# Patient Record
Sex: MALE | Race: WHITE | Employment: OTHER | URBAN - METROPOLITAN AREA
[De-identification: names, ages, dates, MRNs, and addresses within clinical notes are randomized per-mention and may not be internally consistent; named-entity substitution may affect disease eponyms.]

---

## 2017-11-16 ENCOUNTER — HOSPITAL ENCOUNTER (OUTPATIENT)
Dept: SURGERY | Age: 62
Discharge: HOME | End: 2017-11-16
Attending: SURGERY

## 2017-11-21 ENCOUNTER — ANESTHESIA EVENT (OUTPATIENT)
Dept: SURGERY | Age: 62
End: 2017-11-21
Payer: COMMERCIAL

## 2017-11-21 RX ORDER — NALBUPHINE HYDROCHLORIDE 20 MG/ML
5 INJECTION, SOLUTION INTRAMUSCULAR; INTRAVENOUS; SUBCUTANEOUS
Status: CANCELLED | OUTPATIENT
Start: 2017-11-21

## 2017-11-21 RX ORDER — SODIUM CHLORIDE 0.9 % (FLUSH) 0.9 %
5-10 SYRINGE (ML) INJECTION AS NEEDED
Status: CANCELLED | OUTPATIENT
Start: 2017-11-21

## 2017-11-21 RX ORDER — NALOXONE HYDROCHLORIDE 0.4 MG/ML
0.1 INJECTION, SOLUTION INTRAMUSCULAR; INTRAVENOUS; SUBCUTANEOUS
Status: CANCELLED | OUTPATIENT
Start: 2017-11-21

## 2017-11-21 RX ORDER — FLUMAZENIL 0.1 MG/ML
0.2 INJECTION INTRAVENOUS
Status: CANCELLED | OUTPATIENT
Start: 2017-11-21

## 2017-11-21 RX ORDER — OXYCODONE HYDROCHLORIDE 5 MG/1
5 TABLET ORAL
Status: CANCELLED | OUTPATIENT
Start: 2017-11-21

## 2017-11-21 RX ORDER — SODIUM CHLORIDE 0.9 % (FLUSH) 0.9 %
5-10 SYRINGE (ML) INJECTION EVERY 8 HOURS
Status: CANCELLED | OUTPATIENT
Start: 2017-11-21

## 2017-11-21 RX ORDER — HYDROMORPHONE HYDROCHLORIDE 2 MG/ML
0.5 INJECTION, SOLUTION INTRAMUSCULAR; INTRAVENOUS; SUBCUTANEOUS
Status: CANCELLED | OUTPATIENT
Start: 2017-11-21

## 2017-11-22 ENCOUNTER — HOSPITAL ENCOUNTER (OUTPATIENT)
Age: 62
Setting detail: OUTPATIENT SURGERY
Discharge: HOME | End: 2017-11-22
Attending: SURGERY | Admitting: SURGERY
Payer: COMMERCIAL

## 2017-11-22 ENCOUNTER — ANESTHESIA (OUTPATIENT)
Dept: SURGERY | Age: 62
End: 2017-11-22
Payer: COMMERCIAL

## 2017-11-22 VITALS
BODY MASS INDEX: 24.99 KG/M2 | RESPIRATION RATE: 16 BRPM | TEMPERATURE: 97.3 F | HEIGHT: 75 IN | WEIGHT: 201 LBS | HEART RATE: 62 BPM | OXYGEN SATURATION: 98 % | SYSTOLIC BLOOD PRESSURE: 148 MMHG | DIASTOLIC BLOOD PRESSURE: 92 MMHG

## 2017-11-22 PROCEDURE — 77030008467 HC STPLR SKN COVD -B: Performed by: SURGERY

## 2017-11-22 PROCEDURE — 77030020782 HC GWN BAIR PAWS FLX 3M -B: Performed by: ANESTHESIOLOGY

## 2017-11-22 PROCEDURE — 74011250636 HC RX REV CODE- 250/636: Performed by: ANESTHESIOLOGY

## 2017-11-22 PROCEDURE — 77030002916 HC SUT ETHLN J&J -A: Performed by: SURGERY

## 2017-11-22 PROCEDURE — 74011250636 HC RX REV CODE- 250/636: Performed by: SURGERY

## 2017-11-22 PROCEDURE — 77030018836 HC SOL IRR NACL ICUM -A: Performed by: SURGERY

## 2017-11-22 PROCEDURE — 77030020143 HC AIRWY LARYN INTUB CGAS -A: Performed by: ANESTHESIOLOGY

## 2017-11-22 PROCEDURE — 76010000138 HC OR TIME 0.5 TO 1 HR: Performed by: SURGERY

## 2017-11-22 PROCEDURE — 74011000250 HC RX REV CODE- 250: Performed by: SURGERY

## 2017-11-22 PROCEDURE — 77030012411 HC DRN WND CARD -A: Performed by: SURGERY

## 2017-11-22 PROCEDURE — 77030031139 HC SUT VCRL2 J&J -A: Performed by: SURGERY

## 2017-11-22 PROCEDURE — 76060000032 HC ANESTHESIA 0.5 TO 1 HR: Performed by: SURGERY

## 2017-11-22 PROCEDURE — 88305 TISSUE EXAM BY PATHOLOGIST: CPT | Performed by: SURGERY

## 2017-11-22 PROCEDURE — 77030011640 HC PAD GRND REM COVD -A: Performed by: SURGERY

## 2017-11-22 PROCEDURE — 74011250636 HC RX REV CODE- 250/636

## 2017-11-22 PROCEDURE — 76210000016 HC OR PH I REC 1 TO 1.5 HR: Performed by: SURGERY

## 2017-11-22 PROCEDURE — 74011000250 HC RX REV CODE- 250

## 2017-11-22 PROCEDURE — 74011000250 HC RX REV CODE- 250: Performed by: ANESTHESIOLOGY

## 2017-11-22 RX ORDER — PROPOFOL 10 MG/ML
INJECTION, EMULSION INTRAVENOUS AS NEEDED
Status: DISCONTINUED | OUTPATIENT
Start: 2017-11-22 | End: 2017-11-22 | Stop reason: HOSPADM

## 2017-11-22 RX ORDER — OXYCODONE AND ACETAMINOPHEN 5; 325 MG/1; MG/1
1-2 TABLET ORAL
Qty: 40 TAB | Refills: 0 | Status: SHIPPED | OUTPATIENT
Start: 2017-11-22 | End: 2018-01-04 | Stop reason: ALTCHOICE

## 2017-11-22 RX ORDER — SODIUM CHLORIDE, SODIUM LACTATE, POTASSIUM CHLORIDE, CALCIUM CHLORIDE 600; 310; 30; 20 MG/100ML; MG/100ML; MG/100ML; MG/100ML
100 INJECTION, SOLUTION INTRAVENOUS CONTINUOUS
Status: DISCONTINUED | OUTPATIENT
Start: 2017-11-22 | End: 2017-11-22 | Stop reason: HOSPADM

## 2017-11-22 RX ORDER — KETOROLAC TROMETHAMINE 30 MG/ML
INJECTION, SOLUTION INTRAMUSCULAR; INTRAVENOUS AS NEEDED
Status: DISCONTINUED | OUTPATIENT
Start: 2017-11-22 | End: 2017-11-22 | Stop reason: HOSPADM

## 2017-11-22 RX ORDER — CEFAZOLIN SODIUM IN 0.9 % NACL 2 G/50 ML
2 INTRAVENOUS SOLUTION, PIGGYBACK (ML) INTRAVENOUS ONCE
Status: COMPLETED | OUTPATIENT
Start: 2017-11-22 | End: 2017-11-22

## 2017-11-22 RX ORDER — LIDOCAINE HYDROCHLORIDE 10 MG/ML
0.1 INJECTION INFILTRATION; PERINEURAL AS NEEDED
Status: DISCONTINUED | OUTPATIENT
Start: 2017-11-22 | End: 2017-11-22 | Stop reason: HOSPADM

## 2017-11-22 RX ORDER — LIDOCAINE HYDROCHLORIDE 20 MG/ML
INJECTION, SOLUTION EPIDURAL; INFILTRATION; INTRACAUDAL; PERINEURAL AS NEEDED
Status: DISCONTINUED | OUTPATIENT
Start: 2017-11-22 | End: 2017-11-22 | Stop reason: HOSPADM

## 2017-11-22 RX ORDER — MIDAZOLAM HYDROCHLORIDE 1 MG/ML
2 INJECTION, SOLUTION INTRAMUSCULAR; INTRAVENOUS
Status: DISCONTINUED | OUTPATIENT
Start: 2017-11-22 | End: 2017-11-22 | Stop reason: HOSPADM

## 2017-11-22 RX ORDER — BUPIVACAINE HYDROCHLORIDE 5 MG/ML
INJECTION, SOLUTION EPIDURAL; INTRACAUDAL AS NEEDED
Status: DISCONTINUED | OUTPATIENT
Start: 2017-11-22 | End: 2017-11-22 | Stop reason: HOSPADM

## 2017-11-22 RX ADMIN — PROPOFOL 200 MG: 10 INJECTION, EMULSION INTRAVENOUS at 16:11

## 2017-11-22 RX ADMIN — CEFAZOLIN 2 G: 1 INJECTION, POWDER, FOR SOLUTION INTRAMUSCULAR; INTRAVENOUS; PARENTERAL at 16:07

## 2017-11-22 RX ADMIN — SODIUM CHLORIDE, SODIUM LACTATE, POTASSIUM CHLORIDE, AND CALCIUM CHLORIDE 100 ML/HR: 600; 310; 30; 20 INJECTION, SOLUTION INTRAVENOUS at 13:33

## 2017-11-22 RX ADMIN — LIDOCAINE HYDROCHLORIDE 0.1 ML: 10 INJECTION, SOLUTION INFILTRATION; PERINEURAL at 13:33

## 2017-11-22 RX ADMIN — KETOROLAC TROMETHAMINE 30 MG: 30 INJECTION, SOLUTION INTRAMUSCULAR; INTRAVENOUS at 16:35

## 2017-11-22 RX ADMIN — MIDAZOLAM HYDROCHLORIDE 2 MG: 1 INJECTION, SOLUTION INTRAMUSCULAR; INTRAVENOUS at 14:21

## 2017-11-22 RX ADMIN — LIDOCAINE HYDROCHLORIDE 60 MG: 20 INJECTION, SOLUTION EPIDURAL; INFILTRATION; INTRACAUDAL; PERINEURAL at 16:11

## 2017-11-22 NOTE — DISCHARGE INSTRUCTIONS
1. Diet as tolerated except for a  low fat diet after laparoscopic cholecystectomy. 2. Showering is allowed, but no tub baths, hot tubs or swimming. 3. Drainage is common from the wounds. Change the dressings as needed. Call our office if the wounds become reddened, tender, feel warm to the touch or pus starts to drain from the wound. 4. Take prescribed pain medication as directed, usually Percocet, Norco, Ultram or Dilaudid. Take over the counter medication for minor pain. 5. Ice may be applied intermittently to the surgical site or sites. 6. Call or office, [phone], if problems arise. 7. Follow up in the office at the assigned time. 8. Resume all medications as taken per surgery, unless specifically instructed not to take certain ones. 9. No lifting more than 25 pounds until told otherwise. 10. Driving is allowed 3 days after surgery as long as you feel comfortable enough to drive and have not taken any prescription pain medication prior to driving. 11. Change the dressing as necessary. There is a drain in place with drainage expected.

## 2017-11-22 NOTE — ANESTHESIA PREPROCEDURE EVALUATION
Anesthetic History               Review of Systems / Medical History  Patient summary reviewed and pertinent labs reviewed    Pulmonary  Within defined limits                 Neuro/Psych         Psychiatric history     Cardiovascular    Hypertension: well controlled          Pacemaker (SSS)    Exercise tolerance: >4 METS     GI/Hepatic/Renal                Endo/Other        Arthritis     Other Findings              Physical Exam    Airway  Mallampati: II  TM Distance: > 6 cm  Neck ROM: normal range of motion   Mouth opening: Normal     Cardiovascular    Rhythm: regular  Rate: normal         Dental    Dentition: Caps/crowns     Pulmonary  Breath sounds clear to auscultation               Abdominal         Other Findings            Anesthetic Plan    ASA: 3  Anesthesia type: general          Induction: Intravenous  Anesthetic plan and risks discussed with: Patient

## 2017-11-22 NOTE — INTERVAL H&P NOTE
H&P Update:  Katherine Alvarado III was seen and examined. History and physical has been reviewed. The patient has been examined.  There have been no significant clinical changes since the completion of the originally dated History and Physical.    Signed By: Doe Silva MD     November 22, 2017 1:12 PM

## 2017-11-22 NOTE — PERIOP NOTES
O2 saturation down to 85% after Versed 2mg IV given. O2 at 2L nasal canula applied with sats to 94%. Siderails upx2, call light within reach. Patient responds appropriately to verbal stimuli.

## 2017-11-22 NOTE — H&P (VIEW-ONLY)
aDate: 10/26/2017      Name: Lobito Ghosh      MRN: [id number]       : 1955       Age: 60 y.o. Sex: male        Manuel Floyd MD       CC:    Chief Complaint   Patient presents with    New Patient     lipoma left hip       HPI:     Lobito Ghosh is a 60 y.o. male who presents for evaluation of a soft tissue mass as a referral from Dr. Eveline Yun. The patient has noticed a left buttock soft tissue mass for the past three months. It is painful at times. No report of trauma to the area. No drainage. No infection. PMH:    Past Medical History:   Diagnosis Date    Anxiety     controlled with med.  BPH (benign prostatic hypertrophy)     BPH: , 8-13-15 2014    Chest pain     Chronic prostatitis 2014    Depression     controlled with med.  Deviated nasal septum Dr. Riccardo DAVENPORT (degenerative joint disease), lumbosacral 10.2014 primarily L2-3    Dyslipidemia     Dyspnea/shortness of breath     Family History of Coronary Artery Bypass Surgery-mother 2009    GERD (gastroesophageal reflux disease)     controlled with meds    History of kidney stones     passed without surgery    HTN (hypertension) 2009    HX OTHER MEDICAL low hdls    Hypercalcemia     Hyperhomocysteinemia ([address]     Hyperlipidemia     controlled with med    Hypertension x 7 yrs    controlled with meds    Hypertriglyceridemia 08.    Pacemaker     Pre-diabetes .    Sick sinus syndrome,tachycardia-bradycardia  ([address]     Syncope and collapse 2009    Tubular adenoma 2006,  [employer]    Tubular adenoma of colon: '10, [facility] Ass[facility]  2015    Vitamin D deficiency        PSH:    Past Surgical History:   Procedure Laterality Date    HX COLONOSCOPY      HX ORTHOPAEDIC  1990    left  shoulder rotator cuff    HX ORTHOPAEDIC      repair of fx. elbow    HX PACEMAKER  2009    [employer]ronik       MEDS:    Current Outpatient Prescriptions   Medication Sig    losartan (COZAAR) 100 mg tablet Take 1 Tab by mouth daily.  sertraline (ZOLOFT) 100 mg tablet Take 1 Tab by mouth every morning.  simvastatin (ZOCOR) 10 mg tablet Take 1 Tab by mouth nightly.  CYANOCOBALAMIN, VITAMIN B-12, (VITAMIN B-12 PO) Take  by mouth daily. No current facility-administered medications for this visit. ALLERGIES:      Allergies   Allergen Reactions    Ace Inhibitors Cough       SH:    Social History   Substance Use Topics    Smoking status: Never Smoker    Smokeless tobacco: Never Used    Alcohol use No       FH:    Family History   Problem Relation Age of Onset    Heart Disease Mother     Diabetes Mother        ROS: The patient has no difficulty with chest pain or shortness of breath. No fever or chills. Comprehensive 13 point review of systems was otherwise unremarkable except as noted above. Physical Exam:     Visit Vitals    BP (!) 151/94    Pulse 67    Ht 6' 3\" (1.905 m)    Wt 201 lb (91.2 kg)    BMI 25.12 kg/m2       General: Alert, oriented, cooperative white male in no acute distress. Eyes: Sclera are clear. Conjunctiva and lids within normal limits. No icterus. Ears and Nose: no gross deformities to visual inspection, gross hearing intact  Neck: Supple, trachea midline. No lymphadenopathy. Resp: Breathing is  non-labored. Lungs clear to auscultation without wheezing or rhonchi   CV: RRR. No murmurs, rubs or gallops appreciated. Abd: soft non-tender and non-distended without peritoneal signs. +bs   Buttock: left lateral buttock with a 10 cm x 8 cm soft tissue mass. No skin involvement. No signs of infection. Tender to palpation of tissue surrounding the mass. Psych:  Mood and affect appropriate. Short-term memory and understanding intact      Assessment/Plan:  Aide Anne is a 60 y.o. male who has signs and symptoms consistent with a left buttock soft tissue mass.     1.  Excision of a left buttock soft tissue mass in the operating room. 2.  I went through the risks of bleeding, infection, anesthesia, hematoma/seroma formation and possible recurrence of the lesion. I said it may be necessary to place a drain. It may be necessary to leave the wound open, if badly infected.     Gricel Garza MD      [facility]   10/26/2017  3:36 PM

## 2017-11-22 NOTE — BRIEF OP NOTE
BRIEF OPERATIVE NOTE    Date of Procedure: 11/22/2017   Preoperative Diagnosis: LEFT HIP SOFT TISSUE MASS  Postoperative Diagnosis: SAME   Procedure(s):  EXCISION SOFT TISSUE MASS LEFT HIP MEASURING 10 CM X 6 CM X 2 CM  Surgeon(s) and Role:     * Merlin Mcalpine, MD - Primary         Assistant Staff:       Surgical Staff:  Circ-1: Krishna Hendrix RN  Scrub Tech-1: Fantasma Nicole  Event Time In   Incision Start 1626   Incision Close 1639     Anesthesia: General plus local  Estimated Blood Loss: 5 cc's  Specimens:   ID Type Source Tests Collected by Time Destination   1 : SOFT TISSUE MASS LEFT HIP Preservative   Merlin Mcalpine, MD 11/22/2017 1633 Pathology      Findings: See dictated note   Complications: None.   Implants: * No implants in log *

## 2017-11-23 NOTE — ANESTHESIA POSTPROCEDURE EVALUATION
Post-Anesthesia Evaluation and Assessment    Patient: Mariano Basurto MRN: [id number]  SSN: [id number]    YOB: 1955  Age: 60 y.o. Sex: male       Cardiovascular Function/Vital Signs  Visit Vitals    BP (P) 145/89    Pulse (P) 62    Temp 36.3 °C (97.3 °F)    Resp (P) 18    Ht 6' 3\" (1.905 m)    Wt 91.2 kg (201 lb)    SpO2 (P) 99%    BMI 25.12 kg/m2       Patient is status post general anesthesia for Procedure(s):  EXCISION SOFT TISSUE MASS LEFT HIP. Nausea/Vomiting: None    Postoperative hydration reviewed and adequate. Pain:  Pain Scale 1: Numeric (0 - 10) (11/22/17 1735)  Pain Intensity 1: 0 (11/22/17 1735)   Managed    Neurological Status:   Neuro (WDL): Exceptions to WDL (11/22/17 1735)  Neuro  Neurologic State: Drowsy (11/22/17 1735)  Orientation Level: Oriented X4 (11/22/17 1735)  Cognition: Follows commands (11/22/17 1735)  Speech: Clear (11/22/17 1735)  LUE Motor Response: Purposeful (11/22/17 1735)  LLE Motor Response: Purposeful (11/22/17 1735)  RUE Motor Response: Purposeful (11/22/17 1735)  RLE Motor Response: Purposeful (11/22/17 1735)   At baseline    Mental Status and Level of Consciousness: Arousable    Pulmonary Status:   O2 Device: (P) Room air (11/22/17 1750)   Adequate oxygenation and airway patent    Complications related to anesthesia: None    Post-anesthesia assessment completed.  No concerns    Signed By: Frank Sandhoff, MD     November 22, 2017

## 2017-11-23 NOTE — OP NOTES
[address]   OPERATIVE REPORT       Name:  Yomaira Sloan   MR#:  [id number]   :  1955   Account #:  [de-identified]   Date of Adm:  2017       DATE OF PROCEDURE: 2017    PREOPERATIVE DIAGNOSIS: Left hip soft tissue mass. POSTOPERATIVE DIAGNOSIS: Left hip soft tissue mass, probable   lipoma. PROCEDURE PERFORMED: Excision of a left buttock soft tissue   mass, measuring 10 x 6 x 2 cm. SURGEON: Fidelia Garza MD    ANESTHESIA: General via LMA, with Dr. Vania Anderson.    ESTIMATED BLOOD LOSS: 5 mL. SPECIMENS REMOVED: Left hip soft tissue mass. COMPLICATIONS:     HISTORY: This is a 60-year-old male referred by Dr. Amalia Lam for   evaluation of a left hip soft tissue mass. The patient says the   area has grown in size, and as it has grown in size, it has   gotten more uncomfortable. It has no noticeable when he stands   up. Dr. Amalia Lam referred him for surgical evaluation. I talked to   the patient, he wished to have it excised, scheduled for surgery   today. I went through risks of bleeding, infection, anesthesia,   hematoma, seroma formation and potential recurrence of the   lesion. The patient was agreeable, signed a consent form. DESCRIPTION OF PROCEDURE: The patient was seen in the preop   area, where the area was marked, with the patient's consent. The   patient was then transported to room #3 at [facility]   operating room. The patient had an LMA placed. Once he was on   the operating table, he was then turned in the right lateral   decubitus position. The beanbag had already been applied. It was   positioned so that the left hip was up toward the ceiling. This   area was then prepped and draped in the usual sterile manner. I   did a time-out identifying patient, surgeon and procedure, and   his birthday of 1955. When everyone in the room agreed, we   began the procedure.  I made a horizontal incision overlying this   soft tissue mass in the left hip. I incised the skin and soft   tissue. The soft tissue was further divided with electrocautery. A 10 x 6 x 2 cm soft tissue mass, probable lipoma, was then   excised. The lesion was sent to Pathology for evaluation. Flaps   were raised circumferentially. A 1/4-inch Penrose drain was   placed in the base of the wound. We irrigated the wound, and   when hemostasis had been achieved with electrocautery, we closed   the wound. The Penrose drain was secured to the skin with a 3-0   nylon suture, there were 3-0 nylon vertical mattress sutures   placed, and then surgical staples. I injected 30 mL of 0.5%   Marcaine around the wound site. The patient tolerated the   procedure well, was extubated, brought to recovery room in   stable condition.         Peyton Salcedo MD      [facility]   D:  11/22/2017   16:46   T:  11/23/2017   09:41   Job #:  [id number]

## 2019-04-16 PROBLEM — Z00.00 WELCOME TO MEDICARE PREVENTIVE VISIT: Status: ACTIVE | Noted: 2019-04-16

## 2019-04-16 PROBLEM — Z71.89 ADVANCED CARE PLANNING/COUNSELING DISCUSSION: Status: ACTIVE | Noted: 2019-04-16

## 2019-04-16 PROBLEM — Z00.00 WELCOME TO MEDICARE PREVENTIVE VISIT: Chronic | Status: ACTIVE | Noted: 2019-04-16

## 2019-04-16 PROBLEM — Z71.89 ADVANCED CARE PLANNING/COUNSELING DISCUSSION: Chronic | Status: ACTIVE | Noted: 2019-04-16

## 2020-06-11 NOTE — PROGRESS NOTES
Do you currently have any signs or symptoms of respiratory infection, such as fever, cough, shortness of breath, or sore throat? no    In the last 14 days have you had contact with someone with confirmed or presumptive diagnosis of COVID-19 or someone under investigation of COVID-19?  no    In the last 14 days have you traveled or have someone in your home who has traveled to [place], [facility], [facility], [place], [place], [place], [place], or [place]? no

## 2020-06-11 NOTE — PROGRESS NOTES
Patient pre-assessment complete for [facility] scheduled for 1230, arrival time 1030. Patient verified using . Patient instructed to bring all home medications in labeled bottles on the day of procedure. NPO status reinforced. Patient informed to take a full dose aspirin 325mg  or 81 mg x 4 on the day of procedure. Patient instructed to HOLD nothing. Instructed they can take all other medications excluding vitamins & supplements. Patient verbalizes understanding of all instructions & denies any questions at this time.

## 2020-06-12 ENCOUNTER — HOSPITAL ENCOUNTER (OUTPATIENT)
Dept: CARDIAC CATH/INVASIVE PROCEDURES | Age: 65
Discharge: HOME | End: 2020-06-12
Attending: INTERNAL MEDICINE | Admitting: INTERNAL MEDICINE
Payer: MEDICARE

## 2020-06-12 VITALS
SYSTOLIC BLOOD PRESSURE: 118 MMHG | DIASTOLIC BLOOD PRESSURE: 69 MMHG | BODY MASS INDEX: 23.62 KG/M2 | RESPIRATION RATE: 14 BRPM | HEART RATE: 66 BPM | WEIGHT: 190 LBS | HEIGHT: 75 IN | OXYGEN SATURATION: 98 %

## 2020-06-12 LAB
ANION GAP SERPL CALC-SCNC: 5 MMOL/L (ref 7–16)
ATRIAL RATE: 63 BPM
BUN SERPL-MCNC: 15 MG/DL (ref 8–23)
CALCIUM SERPL-MCNC: 9.8 MG/DL (ref 8.3–10.4)
CALCULATED P AXIS, ECG09: 96 DEGREES
CALCULATED R AXIS, ECG10: 72 DEGREES
CALCULATED T AXIS, ECG11: 68 DEGREES
CHLORIDE SERPL-SCNC: 108 MMOL/L (ref 98–107)
CO2 SERPL-SCNC: 28 MMOL/L (ref 21–32)
CREAT SERPL-MCNC: 1.16 MG/DL (ref 0.8–1.5)
DIAGNOSIS, 93000: NORMAL
ERYTHROCYTE [DISTWIDTH] IN BLOOD BY AUTOMATED COUNT: 12.9 % (ref 11.9–14.6)
GLUCOSE SERPL-MCNC: 99 MG/DL (ref 65–100)
HCT VFR BLD AUTO: 43.3 % (ref 41.1–50.3)
HGB BLD-MCNC: 14.4 G/DL (ref 13.6–17.2)
INR PPP: 1
MAGNESIUM SERPL-MCNC: 2 MG/DL (ref 1.8–2.4)
MCH RBC QN AUTO: 30.3 PG (ref 26.1–32.9)
MCHC RBC AUTO-ENTMCNC: 33.3 G/DL (ref 31.4–35)
MCV RBC AUTO: 91.2 FL (ref 79.6–97.8)
NRBC # BLD: 0 K/UL (ref 0–0.2)
P-R INTERVAL, ECG05: 244 MS
PLATELET # BLD AUTO: 160 K/UL (ref 150–450)
PMV BLD AUTO: 9.6 FL (ref 9.4–12.3)
POTASSIUM SERPL-SCNC: 3.8 MMOL/L (ref 3.5–5.1)
PROTHROMBIN TIME: 13 SEC (ref 12–14.7)
Q-T INTERVAL, ECG07: 386 MS
QRS DURATION, ECG06: 90 MS
QTC CALCULATION (BEZET), ECG08: 395 MS
RBC # BLD AUTO: 4.75 M/UL (ref 4.23–5.6)
SODIUM SERPL-SCNC: 141 MMOL/L (ref 136–145)
VENTRICULAR RATE, ECG03: 63 BPM
WBC # BLD AUTO: 4.4 K/UL (ref 4.3–11.1)

## 2020-06-12 PROCEDURE — 85027 COMPLETE CBC AUTOMATED: CPT

## 2020-06-12 PROCEDURE — 93005 ELECTROCARDIOGRAM TRACING: CPT | Performed by: INTERNAL MEDICINE

## 2020-06-12 PROCEDURE — C1894 INTRO/SHEATH, NON-LASER: HCPCS

## 2020-06-12 PROCEDURE — 80048 BASIC METABOLIC PNL TOTAL CA: CPT

## 2020-06-12 PROCEDURE — 74011636320 HC RX REV CODE- 636/320: Performed by: INTERNAL MEDICINE

## 2020-06-12 PROCEDURE — C1769 GUIDE WIRE: HCPCS

## 2020-06-12 PROCEDURE — 93458 L HRT ARTERY/VENTRICLE ANGIO: CPT

## 2020-06-12 PROCEDURE — 77030029997 HC DEV COM RDL R BND TELE -B

## 2020-06-12 PROCEDURE — 83735 ASSAY OF MAGNESIUM: CPT

## 2020-06-12 PROCEDURE — 77030016699 HC CATH ANGI DX INFN1 CARD -A

## 2020-06-12 PROCEDURE — 77030015766

## 2020-06-12 PROCEDURE — 99152 MOD SED SAME PHYS/QHP 5/>YRS: CPT

## 2020-06-12 PROCEDURE — 85610 PROTHROMBIN TIME: CPT

## 2020-06-12 PROCEDURE — 74011000250 HC RX REV CODE- 250: Performed by: INTERNAL MEDICINE

## 2020-06-12 PROCEDURE — 74011250636 HC RX REV CODE- 250/636: Performed by: INTERNAL MEDICINE

## 2020-06-12 RX ORDER — LIDOCAINE HYDROCHLORIDE 10 MG/ML
10-30 INJECTION, SOLUTION EPIDURAL; INFILTRATION; INTRACAUDAL; PERINEURAL ONCE
Status: COMPLETED | OUTPATIENT
Start: 2020-06-12 | End: 2020-06-12

## 2020-06-12 RX ORDER — FENTANYL CITRATE 50 UG/ML
25-50 INJECTION, SOLUTION INTRAMUSCULAR; INTRAVENOUS
Status: DISCONTINUED | OUTPATIENT
Start: 2020-06-12 | End: 2020-06-12 | Stop reason: HOSPADM

## 2020-06-12 RX ORDER — SODIUM CHLORIDE 9 MG/ML
75 INJECTION, SOLUTION INTRAVENOUS CONTINUOUS
Status: DISCONTINUED | OUTPATIENT
Start: 2020-06-12 | End: 2020-06-12 | Stop reason: HOSPADM

## 2020-06-12 RX ORDER — MIDAZOLAM HYDROCHLORIDE 1 MG/ML
.5-2 INJECTION, SOLUTION INTRAMUSCULAR; INTRAVENOUS
Status: DISCONTINUED | OUTPATIENT
Start: 2020-06-12 | End: 2020-06-12 | Stop reason: HOSPADM

## 2020-06-12 RX ORDER — GUAIFENESIN 100 MG/5ML
324 LIQUID (ML) ORAL ONCE
Status: DISCONTINUED | OUTPATIENT
Start: 2020-06-12 | End: 2020-06-12 | Stop reason: HOSPADM

## 2020-06-12 RX ORDER — HEPARIN SODIUM 10000 [USP'U]/ML
1000-10000 INJECTION, SOLUTION INTRAVENOUS; SUBCUTANEOUS
Status: DISCONTINUED | OUTPATIENT
Start: 2020-06-12 | End: 2020-06-12 | Stop reason: HOSPADM

## 2020-06-12 RX ORDER — HEPARIN SODIUM 200 [USP'U]/100ML
3 INJECTION, SOLUTION INTRAVENOUS CONTINUOUS
Status: DISCONTINUED | OUTPATIENT
Start: 2020-06-12 | End: 2020-06-12 | Stop reason: HOSPADM

## 2020-06-12 RX ADMIN — FENTANYL CITRATE 25 MCG: 50 INJECTION, SOLUTION INTRAMUSCULAR; INTRAVENOUS at 13:29

## 2020-06-12 RX ADMIN — IOPAMIDOL 100 ML: 755 INJECTION, SOLUTION INTRAVENOUS at 13:40

## 2020-06-12 RX ADMIN — SODIUM CHLORIDE 75 ML/HR: 900 INJECTION, SOLUTION INTRAVENOUS at 11:21

## 2020-06-12 RX ADMIN — MIDAZOLAM 2 MG: 1 INJECTION INTRAMUSCULAR; INTRAVENOUS at 13:29

## 2020-06-12 RX ADMIN — LIDOCAINE HYDROCHLORIDE 4 ML: 10 INJECTION, SOLUTION EPIDURAL; INFILTRATION; INTRACAUDAL; PERINEURAL at 13:10

## 2020-06-12 RX ADMIN — HEPARIN SODIUM 3 UNITS/HR: 200 INJECTION, SOLUTION INTRAVENOUS at 13:09

## 2020-06-12 RX ADMIN — HEPARIN SODIUM 2 ML: 10000 INJECTION, SOLUTION INTRAVENOUS; SUBCUTANEOUS at 13:00

## 2020-06-12 NOTE — PROGRESS NOTES
Patient received to [address] [place] [address] room # 11  Ambulatory from [facility]. Patient scheduled for [facility] today with Dr Ivanna Padilla. Procedure reviewed & questions answered, voiced good understanding consent obtained & placed on chart. All medications and medical history reviewed. Will prep patient per orders. Patient & family updated on plan of care. The patient has a fraility score of 3-MANAGING WELL, based on ability to perform ADLS by self.

## 2020-06-12 NOTE — PROGRESS NOTES
TRANSFER - IN REPORT:    Verbal report received from Yair RN(name) on Lovetta Free III  being received from cath lab(unit) for routine progression of care      Report consisted of patients Situation, Background, Assessment and   Recommendations(SBAR). Information from the following report(s) Procedure Summary was reviewed with the receiving nurse. Opportunity for questions and clarification was provided. Assessment completed upon patients arrival to unit and care assumed.

## 2020-06-12 NOTE — PROCEDURES
[facility]  CARDIAC CATH    Name:  Piter Cuellar  MR#:  [id number]  :  1955  ACCOUNT #:  [de-identified]  DATE OF SERVICE:  2020    PROCEDURES PERFORMED:  Left heart catheterization via the right radial artery with a 5-Argentine Tiger catheter and angled pigtail. TR band was placed with good hemostasis. PREOPERATIVE DIAGNOSIS:  Chest pain. POSTOPERATIVE DIAGNOSIS:  Noncardiac chest pain. SURGEON:  Brooklyn Loja MD    ASSISTANT:  None. ESTIMATED BLOOD LOSS:  5 mL    SPECIMENS REMOVED:  None. COMPLICATIONS:  None. IMPLANTS:  None. ANESTHESIA:  Provided by Joslyn Amin RN, beginning at 0927 and concluding at [phone]. A 2 mg Versed were given and 25 mcg fentanyl. Vital signs and saturations were stable throughout. FINDINGS:  Left ventricle has normal size, normal systolic function. Ejection fraction is 60%. Left ventricular end-diastolic pressure is 4 mmHg. Opening aortic pressure is 102/71. No gradient across the aortic valve. Left main coronary artery is in normal anatomic position, free of significant disease. Bifurcates into LAD and circumflex systems. LAD has a proximal diagonal branch that is moderate size. It and the remainder of the LAD are free of significant disease. The circumflex has two distal obtuse marginal branches. The circumflex system is angiographically normal.    The right coronary artery is a large dominant vessel in normal anatomic position with a large PDA and posterolateral obtuse marginal branch that courses to the apex. The right coronary artery is angiographically normal.    CONCLUSION:  Normal coronary angiography with normal systolic function.       Skye Marino MD      [id number]  D:  2020 14:00  T:  2020 18:02  JOB #:  [id number]

## 2020-06-12 NOTE — DISCHARGE INSTRUCTIONS
HEART CATHETERIZATION/ANGIOGRAPHY DISCHARGE INSTRUCTIONS    1. Check puncture site frequently for swelling or bleeding. If there is any bleeding, lie down and apply pressure over the area with a clean towel or washcloth. Notify your doctor for any redness, swelling, drainage, or oozing from the puncture site. Notify your doctor for any fever or chills. 2. If the extremity becomes cold, numb, or painful call [facility]iology at [phone].  3. Activity should be limited for the next 48 hours. Climb stairs as little as possible and avoid any stooping, bending, or strenuous activity for 48 hours. No heavy lifting (anything over 10 pounds) for 3 days. 4. You may resume your usual diet. Drink more fluids than usual.  5. Have a responsible person drive you home and stay with you for at least 24 hours after your heart catheterization/angiography. Do not drive for the next 24 hours. 6. You may remove bandage from your Right wrist in 24 hours. You may shower in 24 hours. No tub baths, hot tubs, or swimming for 1 week. Do not place any lotions, creams, powders, or ointments over puncture site for 1 week. You may place a clean band-aid over the puncture site each day for 5 days. Change daily. 7. Please continue your medications as prescribed by your physician. I have read the above instructions and have had the opportunity to ask questions.       Patient: ________________________   Date: 6/12/2020    Witness: _______________________   Date: 6/12/2020

## 2020-06-12 NOTE — PROGRESS NOTES
R band deflation completed. Right radial dressed with gauze and tegaderm using sterile technique. No bleeding or hematoma. Tolerated without difficulty.

## 2020-06-12 NOTE — PROGRESS NOTES
TRANSFER - OUT REPORT:    Verbal report given to Richelle(name) on [address]  being transferred to cpru(unit) for routine progression of care       Report consisted of patients Situation, Background, Assessment and   Recommendations(SBAR). Information from the following report(s) SBAR, Kardex, Procedure Summary and MAR was reviewed with the receiving nurse. Lines:   Peripheral IV 06/12/20 Right Antecubital (Active)        Opportunity for questions and clarification was provided. Patient transported with:   [facility] with Dr. Ivanna Padilla  Right radial access  No intervention  Versed 2mg  Fentanyl 25mcg  Tr Brand in place @ 13:40 with 11ml in the band.

## 2021-07-20 NOTE — IP AVS SNAPSHOT
[address] [address] 
[phone] Patient: Ed Lott 
MRN: [id number] About your hospitalization You were admitted on:  November 22, 2017 You last received care in the:  [facility] PACU You were discharged on:  November 22, 2017 Why you were hospitalized Your primary diagnosis was:  Not on File Things You Need To Do (next 8 weeks) Follow up with Maddie Bacon MD  
  
Phone:  [phone] Where:  [address] [place] [address] Tuesday Nov 28, 2017 REMOTE DEVICE CHECK ORDERS ONLY ENCOUNTER with [employer] REMOTE PACER 34 at  1:00 PM  
Please remember THIS REMOTE CHECK IS COMPLETED FROM HOME - YOU WILL NOT COME TO THE OFFICE FOR THIS APPOINTMENT. In preparation for this check, please ensure your monitor box is working appropriately. If your monitor requires you to send a transmission, please make sure it is sent by 11:00AM on this day so we can have it processed and resulted to your doctor without delay. If you have a question or problem with the monitor box, please contact your respective company:   [address] [place] [address]/[employer] - [phone]  [employer] - [phone]  [employer] - [phone]  [employer] - [phone]  If you have any further questions or need to move this appointment, we are happy to help and can be reached at [phone]. Where:  [facility] ([address] [place] [address]) Thursday Nov 30, 2017 Follow up with Jr Overton MD  
Call office to schedule a follow up time. Phone:  [phone] Where:  [address] [address] Tuesday Dec 05, 2017 Global Post Op with Jr Overton MD at  3:45 PM  
Where:  [address] [facility] ([address] [facility]) Discharge Orders None A check bereket indicates which time of day the medication should be taken. My Medications TAKE these medications as instructed Instructions Each Dose to Equal  
 Morning Noon Evening Bedtime  
 losartan 100 mg tablet Commonly known as:  COZAAR Your last dose was: Your next dose is: Take 1 Tab by mouth daily. 100 mg  
    
   
   
   
  
 oxyCODONE-acetaminophen 5-325 mg per tablet Commonly known as:  PERCOCET Your last dose was: Your next dose is: Take 1-2 Tabs by mouth every four (4) hours as needed for Pain. Max Daily Amount: 12 Tabs. 1-2 Tab  
    
   
   
   
  
 sertraline 100 mg tablet Commonly known as:  ZOLOFT Your last dose was: Your next dose is: Take 1 Tab by mouth every morning. 100 mg  
    
   
   
   
  
 simvastatin 10 mg tablet Commonly known as:  ZOCOR Your last dose was: Your next dose is: Take 1 Tab by mouth nightly. 10 mg  
    
   
   
   
  
 VITAMIN B-12 PO Your last dose was: Your next dose is: Take  by mouth daily. Where to Get Your Medications Information on where to get these meds will be given to you by the nurse or doctor. ! Ask your nurse or doctor about these medications  
  oxyCODONE-acetaminophen 5-325 mg per tablet Discharge Instructions 1. Diet as tolerated except for a  low fat diet after laparoscopic cholecystectomy. 2. Showering is allowed, but no tub baths, hot tubs or swimming. 3. Drainage is common from the wounds. Change the dressings as needed. Call our office if the wounds become reddened, tender, feel warm to the touch or pus starts to drain from the wound. 4. Take prescribed pain medication as directed, usually Percocet, Norco, Ultram or Dilaudid. Take over the counter medication for minor pain. 5. Ice may be applied intermittently to the surgical site or sites. 6. Call or office, [phone], if problems arise. 7. Follow up in the office at the assigned time. 8. Resume all medications as taken per surgery, unless specifically instructed not to take certain ones. 9. No lifting more than 25 pounds until told otherwise. 10. Driving is allowed 3 days after surgery as long as you feel comfortable enough to drive and have not taken any prescription pain medication prior to driving. 11. Change the dressing as necessary. There is a drain in place with drainage expected. Providers Seen During Your Hospitalization Provider Specialty Primary office phone Collette Mae, [address] [place] [address] Surgery [phone] Your Primary Care Physician (PCP) Primary Care Physician Office Phone Office Fax Hamilton Higginbothamjessi [phone] You are allergic to the following Allergen Reactions Ace Inhibitors Cough Recent Documentation Height Weight BMI Smoking Status 1.905 m 91.2 kg 25.12 kg/m2 Never Smoker Emergency Contacts Name Discharge Info Relation Home Work Mobile Sink,Chaz DISCHARGE CAREGIVER [3] Sister [23] [phone] Patient Belongings The following personal items are in your possession at time of discharge: 
  Dental Appliances: None Please provide this summary of care documentation to your next provider. Signatures-by signing, you are acknowledging that this After Visit Summary has been reviewed with you and you have received a copy. Patient Signature:  ____________________________________________________________ Date:  ____________________________________________________________  
  
Ed Pittman Provider Signature:  ____________________________________________________________ Date:  ____________________________________________________________ Acitretin Pregnancy And Lactation Text: This medication is Pregnancy Category X and should not be given to women who are pregnant or may become pregnant in the future. This medication is excreted in breast milk.

## 2021-07-27 ENCOUNTER — HOSPITAL ENCOUNTER (OUTPATIENT)
Dept: LAB | Age: 66
Discharge: HOME | End: 2021-07-27
Payer: MEDICARE

## 2021-07-27 DIAGNOSIS — R55 SYNCOPE, UNSPECIFIED SYNCOPE TYPE: ICD-10-CM

## 2021-07-27 LAB
ANION GAP SERPL CALC-SCNC: 5 MMOL/L (ref 7–16)
BASOPHILS # BLD: 0.1 K/UL (ref 0–0.2)
BASOPHILS NFR BLD: 1 % (ref 0–2)
BUN SERPL-MCNC: 12 MG/DL (ref 8–23)
CALCIUM SERPL-MCNC: 9.5 MG/DL (ref 8.3–10.4)
CHLORIDE SERPL-SCNC: 105 MMOL/L (ref 98–107)
CO2 SERPL-SCNC: 30 MMOL/L (ref 21–32)
CREAT SERPL-MCNC: 1.03 MG/DL (ref 0.8–1.5)
DIFFERENTIAL METHOD BLD: NORMAL
EOSINOPHIL # BLD: 0.2 K/UL (ref 0–0.8)
EOSINOPHIL NFR BLD: 3 % (ref 0.5–7.8)
ERYTHROCYTE [DISTWIDTH] IN BLOOD BY AUTOMATED COUNT: 12.9 % (ref 11.9–14.6)
GLUCOSE SERPL-MCNC: 128 MG/DL (ref 65–100)
HCT VFR BLD AUTO: 42.8 % (ref 41.1–50.3)
HGB BLD-MCNC: 13.9 G/DL (ref 13.6–17.2)
IMM GRANULOCYTES # BLD AUTO: 0 K/UL (ref 0–0.5)
IMM GRANULOCYTES NFR BLD AUTO: 0 % (ref 0–5)
LYMPHOCYTES # BLD: 1.1 K/UL (ref 0.5–4.6)
LYMPHOCYTES NFR BLD: 22 % (ref 13–44)
MCH RBC QN AUTO: 30.6 PG (ref 26.1–32.9)
MCHC RBC AUTO-ENTMCNC: 32.5 G/DL (ref 31.4–35)
MCV RBC AUTO: 94.3 FL (ref 79.6–97.8)
MONOCYTES # BLD: 0.3 K/UL (ref 0.1–1.3)
MONOCYTES NFR BLD: 6 % (ref 4–12)
NEUTS SEG # BLD: 3.5 K/UL (ref 1.7–8.2)
NEUTS SEG NFR BLD: 68 % (ref 43–78)
NRBC # BLD: 0 K/UL (ref 0–0.2)
PLATELET # BLD AUTO: 162 K/UL (ref 150–450)
PMV BLD AUTO: 10.3 FL (ref 9.4–12.3)
POTASSIUM SERPL-SCNC: 3.6 MMOL/L (ref 3.5–5.1)
RBC # BLD AUTO: 4.54 M/UL (ref 4.23–5.6)
SODIUM SERPL-SCNC: 140 MMOL/L (ref 136–145)
WBC # BLD AUTO: 5.1 K/UL (ref 4.3–11.1)

## 2021-07-27 PROCEDURE — 36415 COLL VENOUS BLD VENIPUNCTURE: CPT

## 2021-07-27 PROCEDURE — 80048 BASIC METABOLIC PNL TOTAL CA: CPT

## 2021-07-27 PROCEDURE — 85025 COMPLETE CBC W/AUTO DIFF WBC: CPT

## 2021-08-02 ENCOUNTER — HOSPITAL ENCOUNTER (OUTPATIENT)
Dept: CT IMAGING | Age: 66
Discharge: HOME | End: 2021-08-02
Attending: PSYCHIATRY & NEUROLOGY
Payer: MEDICARE

## 2021-08-02 DIAGNOSIS — R47.01 EXPRESSIVE APHASIA: ICD-10-CM

## 2021-08-02 PROCEDURE — 70450 CT HEAD/BRAIN W/O DYE: CPT

## 2021-10-29 ENCOUNTER — HOSPITAL ENCOUNTER (OUTPATIENT)
Dept: GENERAL RADIOLOGY | Age: 66
Discharge: HOME | End: 2021-10-29
Attending: FAMILY MEDICINE
Payer: MEDICARE

## 2021-10-29 DIAGNOSIS — M25.551 RIGHT HIP PAIN: ICD-10-CM

## 2021-10-29 DIAGNOSIS — R29.898 WEAKNESS OF RIGHT HIP: ICD-10-CM

## 2021-10-29 PROCEDURE — 73502 X-RAY EXAM HIP UNI 2-3 VIEWS: CPT

## 2021-12-08 NOTE — PERIOP NOTES
Pt did not show for 1530 PAT appointment. Voicemail left for Annita at Dr. Susannah Ceballos office. Purse String (Simple) Text: Given the location of the defect and the characteristics of the surrounding skin a purse string closure was deemed most appropriate.  Undermining was performed circumfirentially around the surgical defect.  A purse string suture was then placed and tightened.

## 2022-03-19 PROBLEM — Z71.89 ADVANCED CARE PLANNING/COUNSELING DISCUSSION: Status: ACTIVE | Noted: 2019-04-16

## 2022-03-20 PROBLEM — Z00.00 MEDICARE ANNUAL WELLNESS VISIT, SUBSEQUENT: Status: ACTIVE | Noted: 2019-04-16

## 2022-04-05 PROBLEM — F03.90 DEMENTIA WITHOUT BEHAVIORAL DISTURBANCE, UNSPECIFIED DEMENTIA TYPE: Status: ACTIVE | Noted: 2022-04-05

## 2022-04-05 PROBLEM — E11.22 TYPE 2 DIABETES MELLITUS WITH CHRONIC KIDNEY DISEASE (HCC): Status: ACTIVE | Noted: 2022-04-05

## 2022-06-20 ENCOUNTER — OFFICE VISIT (OUTPATIENT)
Dept: FAMILY MEDICINE CLINIC | Facility: CLINIC | Age: 67
End: 2022-06-20
Payer: MEDICARE

## 2022-06-20 VITALS
WEIGHT: 190.8 LBS | HEIGHT: 75 IN | BODY MASS INDEX: 23.72 KG/M2 | DIASTOLIC BLOOD PRESSURE: 78 MMHG | SYSTOLIC BLOOD PRESSURE: 130 MMHG

## 2022-06-20 DIAGNOSIS — Z12.11 SPECIAL SCREENING FOR MALIGNANT NEOPLASMS, COLON: ICD-10-CM

## 2022-06-20 DIAGNOSIS — Z00.00 MEDICARE ANNUAL WELLNESS VISIT, SUBSEQUENT: Primary | Chronic | ICD-10-CM

## 2022-06-20 DIAGNOSIS — D12.6 TUBULAR ADENOMA OF COLON: ICD-10-CM

## 2022-06-20 PROBLEM — Z71.89 ADVANCED CARE PLANNING/COUNSELING DISCUSSION: Chronic | Status: ACTIVE | Noted: 2019-04-16

## 2022-06-20 PROCEDURE — 3017F COLORECTAL CA SCREEN DOC REV: CPT | Performed by: FAMILY MEDICINE

## 2022-06-20 PROCEDURE — 1123F ACP DISCUSS/DSCN MKR DOCD: CPT | Performed by: FAMILY MEDICINE

## 2022-06-20 PROCEDURE — G0439 PPPS, SUBSEQ VISIT: HCPCS | Performed by: FAMILY MEDICINE

## 2022-06-20 ASSESSMENT — PATIENT HEALTH QUESTIONNAIRE - PHQ9
10. IF YOU CHECKED OFF ANY PROBLEMS, HOW DIFFICULT HAVE THESE PROBLEMS MADE IT FOR YOU TO DO YOUR WORK, TAKE CARE OF THINGS AT HOME, OR GET ALONG WITH OTHER PEOPLE: 0
6. FEELING BAD ABOUT YOURSELF - OR THAT YOU ARE A FAILURE OR HAVE LET YOURSELF OR YOUR FAMILY DOWN: 0
2. FEELING DOWN, DEPRESSED OR HOPELESS: 1
9. THOUGHTS THAT YOU WOULD BE BETTER OFF DEAD, OR OF HURTING YOURSELF: 0
SUM OF ALL RESPONSES TO PHQ QUESTIONS 1-9: 3
SUM OF ALL RESPONSES TO PHQ QUESTIONS 1-9: 3
3. TROUBLE FALLING OR STAYING ASLEEP: 1
SUM OF ALL RESPONSES TO PHQ9 QUESTIONS 1 & 2: 2
7. TROUBLE CONCENTRATING ON THINGS, SUCH AS READING THE NEWSPAPER OR WATCHING TELEVISION: 0
4. FEELING TIRED OR HAVING LITTLE ENERGY: 0
5. POOR APPETITE OR OVEREATING: 0
SUM OF ALL RESPONSES TO PHQ QUESTIONS 1-9: 3
8. MOVING OR SPEAKING SO SLOWLY THAT OTHER PEOPLE COULD HAVE NOTICED. OR THE OPPOSITE, BEING SO FIGETY OR RESTLESS THAT YOU HAVE BEEN MOVING AROUND A LOT MORE THAN USUAL: 0
SUM OF ALL RESPONSES TO PHQ QUESTIONS 1-9: 3
1. LITTLE INTEREST OR PLEASURE IN DOING THINGS: 1

## 2022-06-20 ASSESSMENT — LIFESTYLE VARIABLES
HOW OFTEN DO YOU HAVE A DRINK CONTAINING ALCOHOL: NEVER
HOW MANY STANDARD DRINKS CONTAINING ALCOHOL DO YOU HAVE ON A TYPICAL DAY: PATIENT DECLINED

## 2022-06-20 NOTE — PROGRESS NOTES
Medicare Annual Wellness Visit    Mackenzie Noe III is here for Medicare AWV    Assessment & Plan   Medicare annual wellness visit, subsequent  Tubular adenoma of colon  Special screening for malignant neoplasms, colon  -     Amb External Referral To Gastroenterology      Recommendations for Preventive Services Due: see orders and patient instructions/AVS.  Recommended screening schedule for the next 5-10 years is provided to the patient in written form: see Patient Instructions/AVS.     Return for Medicare Annual Wellness Visit in 1 year. Subjective       Patient's complete Health Risk Assessment and screening values have been reviewed and are found in Flowsheets. The following problems were reviewed today and where indicated follow up appointments were made and/or referrals ordered.     Positive Risk Factor Screenings with Interventions:               General Health and ACP:  General  In general, how would you say your health is?: Good  In the past 7 days, have you experienced any of the following: New or Increased Pain, New or Increased Fatigue, Loneliness, Social Isolation, Stress or Anger?: No  Do you get the social and emotional support that you need?: Yes  Do you have a Living Will?: (!) No    Advance Directives     Power of  Living Will ACP-Advance Directive ACP-Power of     Not on File Not on File Not on File Not on File      General Health Risk Interventions:  · doing well      Safety:  Do you have working smoke detectors?: Yes  Do you have any tripping hazards - loose or unsecured carpets or rugs?: No  Do you have any tripping hazards - clutter in doorways, halls, or stairs?: No  Do you have either shower bars, grab bars, non-slip mats or non-slip surfaces in your shower or bathtub?: (!) No  Do all of your stairways have a railing or banister?: Yes  Do you always fasten your seatbelt when you are in a car?: Yes    Safety Interventions:  · none needed           Objective   Vitals: 06/20/22 1043   BP: 130/78   Site: Left Upper Arm   Position: Sitting   Weight: 190 lb 12.8 oz (86.5 kg)   Height: 6' 3\" (1.905 m)      Body mass index is 23.85 kg/m². Allergies   Allergen Reactions    Ace Inhibitors Other (See Comments)     Prior to Visit Medications    Medication Sig Taking? Authorizing Provider   memantine (NAMENDA) 10 MG tablet Take half bid for one month than continue with 1 bid Yes Ar Automatic Reconciliation   olmesartan (BENICAR) 20 MG tablet Take 20 mg by mouth Yes Ar Automatic Reconciliation   sertraline (ZOLOFT) 100 MG tablet Take 150 mg by mouth daily Yes Ar Automatic Reconciliation   simvastatin (ZOCOR) 10 MG tablet TAKE ONE TABLET BY MOUTH AT BEDTIME Yes Ar Automatic Reconciliation   MAMMOGRAM:  Is currently up to date or is clinically not indicated  PNEUMONIA VACCINES: Pneumovax is up to date  SHINGLES VACCINE:  is due and reccommended  COLONOSCOPY: is due and reccommended  FIT/COLOGUARD CANDIDATE?:  (Age 46-80, no history of adenomatous polyps, UC or Chrohns or Fam Hx Colon Ca) NO  GLAUCOMA/VISION SCREENING:  Is currently up to date or is clinically not indicated  DEXA SCREENING: initial screening is reccommended in all men over the age of 79 and all women over age 72. Is currently up to date or is clinically not indicated     needed to discuss advanced care directive, end of life planning, will, etc; information given?  No, is utd      CareTeam (Including outside providers/suppliers regularly involved in providing care):   Patient Care Team:  Melani Snyder MD as PCP - Marielle Sosa MD as PCP - [facility] Empaneled Provider  Nancy Green DO as Physician     Reviewed and updated this visit:  Allergies

## 2022-06-20 NOTE — PATIENT INSTRUCTIONS
Personalized Preventive Plan for Oc Bolanos III - 6/20/2022  Medicare offers a range of preventive health benefits. Some of the tests and screenings are paid in full while other may be subject to a deductible, co-insurance, and/or copay. Some of these benefits include a comprehensive review of your medical history including lifestyle, illnesses that may run in your family, and various assessments and screenings as appropriate. After reviewing your medical record and screening and assessments performed today your provider may have ordered immunizations, labs, imaging, and/or referrals for you. A list of these orders (if applicable) as well as your Preventive Care list are included within your After Visit Summary for your review. Other Preventive Recommendations:    · A preventive eye exam performed by an eye specialist is recommended every 1-2 years to screen for glaucoma; cataracts, macular degeneration, and other eye disorders. · A preventive dental visit is recommended every 6 months. · Try to get at least 150 minutes of exercise per week or 10,000 steps per day on a pedometer . · Order or download the FREE \"Exercise & Physical Activity: Your Everyday Guide\" from [employer]. Call 3-390.102.1586 or search [employer] online. · You need 1945-4285 mg of calcium and 2795-0472 IU of vitamin D per day. It is possible to meet your calcium requirement with diet alone, but a vitamin D supplement is usually necessary to meet this goal.  · When exposed to the sun, use a sunscreen that protects against both UVA and UVB radiation with an SPF of 30 or greater. Reapply every 2 to 3 hours or after sweating, drying off with a towel, or swimming. · Always wear a seat belt when traveling in a car. Always wear a helmet when riding a bicycle or motorcycle.   ·   · Benadryl Cream for itching

## 2022-07-20 PROBLEM — Z00.00 MEDICARE ANNUAL WELLNESS VISIT, SUBSEQUENT: Chronic | Status: RESOLVED | Noted: 2019-04-16 | Resolved: 2022-07-20

## 2022-07-28 ENCOUNTER — TELEPHONE (OUTPATIENT)
Dept: NEUROLOGY | Age: 67
End: 2022-07-28

## 2022-07-28 NOTE — TELEPHONE ENCOUNTER
Patient's wife called requesting a letter be written stating that with the patient's diagnosis he is still able to function on his own and without issues, able to complete normal day to day activities. States they need it by 8/12 for a court date. Please fax one capy to Sun Mayers with Araceli Muniz, [place]   Fax: [phone]    Please print a copy and put up front for the patient to .

## 2022-07-28 NOTE — TELEPHONE ENCOUNTER
And now I cannot give the letter I have not seen him in 8 months. I am sorry 9 months. When I saw him he was alone and had significant cognitive deficits. He also told me he was  so I do not know if were able to talk to his wife? But I am not giving a letter until he is seen in the office and he will not be seen in the office in that timeframe. And he will not be seen in a virtual visit in that timeframe.

## 2022-09-12 ENCOUNTER — OFFICE VISIT (OUTPATIENT)
Dept: CARDIOLOGY CLINIC | Age: 67
End: 2022-09-12
Payer: MEDICARE

## 2022-09-12 VITALS
HEIGHT: 75 IN | DIASTOLIC BLOOD PRESSURE: 84 MMHG | HEART RATE: 104 BPM | WEIGHT: 190.2 LBS | SYSTOLIC BLOOD PRESSURE: 120 MMHG | BODY MASS INDEX: 23.65 KG/M2

## 2022-09-12 DIAGNOSIS — I10 PRIMARY HYPERTENSION: Primary | ICD-10-CM

## 2022-09-12 DIAGNOSIS — R00.1 BRADYCARDIA: ICD-10-CM

## 2022-09-12 DIAGNOSIS — E78.5 DYSLIPIDEMIA: ICD-10-CM

## 2022-09-12 PROCEDURE — 99214 OFFICE O/P EST MOD 30 MIN: CPT | Performed by: INTERNAL MEDICINE

## 2022-09-12 PROCEDURE — 1036F TOBACCO NON-USER: CPT | Performed by: INTERNAL MEDICINE

## 2022-09-12 PROCEDURE — 1123F ACP DISCUSS/DSCN MKR DOCD: CPT | Performed by: INTERNAL MEDICINE

## 2022-09-12 PROCEDURE — G8420 CALC BMI NORM PARAMETERS: HCPCS | Performed by: INTERNAL MEDICINE

## 2022-09-12 PROCEDURE — 3017F COLORECTAL CA SCREEN DOC REV: CPT | Performed by: INTERNAL MEDICINE

## 2022-09-12 PROCEDURE — G8428 CUR MEDS NOT DOCUMENT: HCPCS | Performed by: INTERNAL MEDICINE

## 2022-09-12 NOTE — PROGRESS NOTES
[facility] CARDIOLOGY  [address], [address]  PHONE: [phone]      22    NAME:  Vibha Bella III  : 1955  MRN: [id number]       SUBJECTIVE:   James Jorge is a 75 y.o. male seen for a follow up visit regarding the following:     Chief Complaint   Patient presents with    Hypertension         HPI:    No cp or hester. No orthopnea or pnd. No palpitations or syncope. Past Medical History, Past Surgical History, Family history, Social History, and Medications were all reviewed with the patient today and updated as necessary. Current Outpatient Medications   Medication Sig Dispense Refill    Aspirin 81 MG CAPS Take 81 mg by mouth daily      memantine (NAMENDA) 10 MG tablet Take half bid for one month than continue with 1 bid      olmesartan (BENICAR) 20 MG tablet Take 20 mg by mouth      sertraline (ZOLOFT) 100 MG tablet Take 150 mg by mouth daily      simvastatin (ZOCOR) 10 MG tablet TAKE ONE TABLET BY MOUTH AT BEDTIME       No current facility-administered medications for this visit. Social History     Tobacco Use    Smoking status: Never    Smokeless tobacco: Never   Substance Use Topics    Alcohol use: No              PHYSICAL EXAM:   /84   Pulse (!) 104   Ht 6' 3\" (1.905 m)   Wt 190 lb 3.2 oz (86.3 kg)   BMI 23.77 kg/m²    Constitutional: Oriented to person, place, and time. Appears well-developed and well-nourished. Head: Normocephalic and atraumatic. Neck: Neck supple. Cardiovascular: Normal rate and regular rhythm with no murmur -No JVP  Pulmonary/Chest: Breath sounds normal.   Abdominal: Soft. Musculoskeletal: No edema. Neurological: Alert and oriented to person, place, and time. Skin: Skin is warm and dry. Psychiatric: Normal mood and affect. Vitals reviewed.       Wt Readings from Last 3 Encounters:   22 190 lb 3.2 oz (86.3 kg)   22 190 lb 12.8 oz (86.5 kg)   21 194 lb (88 kg) Medical problems and test results were reviewed with the patient today. ASSESSMENT and PLAN    1. Primary hypertension  Stable. Continue benicar      2. Dyslipidemia  Stable. Continue zocor      3. Bradycardia- s/p PM  Stable. Continue to monitor         Return in about 6 months (around 3/12/2023).          Emily Yeung MD  9/12/2022  10:47 AM

## 2022-09-27 ENCOUNTER — OFFICE VISIT (OUTPATIENT)
Dept: FAMILY MEDICINE CLINIC | Facility: CLINIC | Age: 67
End: 2022-09-27
Payer: MEDICARE

## 2022-09-27 VITALS
WEIGHT: 189 LBS | BODY MASS INDEX: 23.5 KG/M2 | SYSTOLIC BLOOD PRESSURE: 128 MMHG | DIASTOLIC BLOOD PRESSURE: 76 MMHG | HEIGHT: 75 IN

## 2022-09-27 DIAGNOSIS — E11.9 CONTROLLED TYPE 2 DIABETES MELLITUS WITHOUT COMPLICATION, WITHOUT LONG-TERM CURRENT USE OF INSULIN (HCC): ICD-10-CM

## 2022-09-27 DIAGNOSIS — Z82.49 FAMILY HISTORY OF CORONARY ARTERY BYPASS SURGERY: ICD-10-CM

## 2022-09-27 DIAGNOSIS — F33.41 RECURRENT MAJOR DEPRESSIVE DISORDER, IN PARTIAL REMISSION (HCC): ICD-10-CM

## 2022-09-27 DIAGNOSIS — R39.11 BENIGN PROSTATIC HYPERPLASIA WITH URINARY HESITANCY: ICD-10-CM

## 2022-09-27 DIAGNOSIS — N40.1 BENIGN PROSTATIC HYPERPLASIA WITH URINARY HESITANCY: ICD-10-CM

## 2022-09-27 DIAGNOSIS — I10 PRIMARY HYPERTENSION: Primary | ICD-10-CM

## 2022-09-27 DIAGNOSIS — I10 PRIMARY HYPERTENSION: ICD-10-CM

## 2022-09-27 DIAGNOSIS — E78.5 DYSLIPIDEMIA: ICD-10-CM

## 2022-09-27 DIAGNOSIS — D12.6 TUBULAR ADENOMA OF COLON: ICD-10-CM

## 2022-09-27 LAB
EST. AVERAGE GLUCOSE BLD GHB EST-MCNC: 123 MG/DL
HBA1C MFR BLD: 5.9 % (ref 4.8–5.6)
PSA SERPL-MCNC: 0.7 NG/ML

## 2022-09-27 PROCEDURE — 2022F DILAT RTA XM EVC RTNOPTHY: CPT | Performed by: FAMILY MEDICINE

## 2022-09-27 PROCEDURE — G8428 CUR MEDS NOT DOCUMENT: HCPCS | Performed by: FAMILY MEDICINE

## 2022-09-27 PROCEDURE — 1036F TOBACCO NON-USER: CPT | Performed by: FAMILY MEDICINE

## 2022-09-27 PROCEDURE — 3017F COLORECTAL CA SCREEN DOC REV: CPT | Performed by: FAMILY MEDICINE

## 2022-09-27 PROCEDURE — G8420 CALC BMI NORM PARAMETERS: HCPCS | Performed by: FAMILY MEDICINE

## 2022-09-27 PROCEDURE — 3046F HEMOGLOBIN A1C LEVEL >9.0%: CPT | Performed by: FAMILY MEDICINE

## 2022-09-27 PROCEDURE — 99214 OFFICE O/P EST MOD 30 MIN: CPT | Performed by: FAMILY MEDICINE

## 2022-09-27 PROCEDURE — 1123F ACP DISCUSS/DSCN MKR DOCD: CPT | Performed by: FAMILY MEDICINE

## 2022-09-27 RX ORDER — SERTRALINE HYDROCHLORIDE 100 MG/1
150 TABLET, FILM COATED ORAL DAILY
Qty: 135 TABLET | Refills: 1 | Status: SHIPPED | OUTPATIENT
Start: 2022-09-27 | End: 2022-11-11

## 2022-09-27 RX ORDER — OLMESARTAN MEDOXOMIL 20 MG/1
20 TABLET ORAL DAILY
Qty: 90 TABLET | Refills: 1 | Status: SHIPPED | OUTPATIENT
Start: 2022-09-27

## 2022-09-27 RX ORDER — SIMVASTATIN 10 MG
10 TABLET ORAL NIGHTLY
Qty: 90 TABLET | Refills: 1 | Status: SHIPPED | OUTPATIENT
Start: 2022-09-27

## 2022-09-27 ASSESSMENT — PATIENT HEALTH QUESTIONNAIRE - PHQ9
SUM OF ALL RESPONSES TO PHQ9 QUESTIONS 1 & 2: 1
SUM OF ALL RESPONSES TO PHQ QUESTIONS 1-9: 1
2. FEELING DOWN, DEPRESSED OR HOPELESS: 0
SUM OF ALL RESPONSES TO PHQ QUESTIONS 1-9: 1
1. LITTLE INTEREST OR PLEASURE IN DOING THINGS: 1

## 2022-09-27 NOTE — PROGRESS NOTES
Subjective:  Barb Talavera is a 75 y.o. male presents today for their semi-annual htn and diet-controlled diabetes visit. They are having no side effects and are doing well. Systems review of cardiovascular and pulmonary systems reveal no complaints or pertinent postivies. They also have a diagnosis of dyslipidemia and are due for lipids, denying any current side effects, continuing diet and exercise the best they can. Patient denies any pounding heart beats or rapid heart beat intervals, flushing, panic like attacks, headaches, dizzyness or flushing. They have drug reistant hypertension, on 3 or more different medicaitons including one diuretic- No  Also is due for annual prostate check. PHQ-9 Total Score: 1 (9/27/2022 10:02 AM)  Does not feel like the Zoloft helps at all has made no difference in his depression/anxiety. However he states in the house most of the day he feels it difficult to get up out of bed or to leave the house just does not want to. He is willing to see a psychiatrist and/or therapist which she has not done  Also due for recheck of depression. How much are you exercising? Yes  Do you smoke? No  Are you taking your medicines as directed most every day? Yes  Do you follow a low sodium DASH diet or similar high blood pressure diet? Yes  Do you check your bp at home with an automated blood pressure device? No  If you don't have a home bp machine, you should purchase one at home and begin to check your pressure at home on a regular basis. Your blood pressure goal is listed under the \"plan section\" below    Allergies   Allergen Reactions    Ace Inhibitors Other (See Comments)      reports that he has never smoked.  He has never used smokeless tobacco.    Current Outpatient Medications   Medication Sig Dispense Refill    olmesartan (BENICAR) 20 MG tablet Take 1 tablet by mouth daily 90 tablet 1    sertraline (ZOLOFT) 100 MG tablet Take 1.5 tablets by mouth daily 135 tablet 1 simvastatin (ZOCOR) 10 MG tablet Take 1 tablet by mouth nightly TAKE ONE TABLET BY MOUTH AT BEDTIME 90 tablet 1    Aspirin 81 MG CAPS Take 81 mg by mouth daily      memantine (NAMENDA) 10 MG tablet Take half bid for one month than continue with 1 bid       No current facility-administered medications for this visit. Lab Results   Component Value Date/Time     08/16/2021 10:08 AM    K 4.2 08/16/2021 10:08 AM    CL 98 08/16/2021 10:08 AM    CO2 28 08/16/2021 10:08 AM    BUN 17 08/16/2021 10:08 AM    CREATININE 1.25 08/16/2021 10:08 AM    GLUCOSE 127 08/16/2021 10:08 AM    CALCIUM 10.4 08/16/2021 10:08 AM        Objective:  Blood pressure 128/76, height 6' 3\" (1.905 m), weight 189 lb (85.7 kg). Body mass index is 23.62 kg/m². BP Readings from Last 3 Encounters:   09/27/22 128/76   09/12/22 120/84   06/20/22 130/78     General- Pleasant and no distress  Psych- alert and oriented to person, place and time  Mood and affect are appropriate to situation  Musculoskeletal - Gait and station examination reveals mid-position with no abnormalities. Neurological- grossly intact. rrr s mrg.   bcta  extremeties are without edema, and dp, and pt pulses are intact  No carotid bruits   Fundoscopic exam is: benign without retinopathology   Prostate of increased size and without asymmetry, nodules, or masses. He has good rectal tone with light brown stool. Some of today's visit is spent counseling with review of symptoms, disposition, prognosis and treatment plan options in addition to limited behavioral counseling and recommendations regarding an enlarged prostate and possible symptoms of low testosterone    AUA symptom index if administered is on flow sheet  Return in one year for annual prostate exam. We will call you back if labs are abnormal.      Assessment:  1. Primary hypertension    2. Controlled type 2 diabetes mellitus without complication, without long-term current use of insulin ([address]    3.  Family history of coronary artery bypass surgery    4. Benign prostatic hyperplasia with urinary hesitancy    5. Dyslipidemia    6. Recurrent major depressive disorder, in partial remission ([facility]    7. Tubular adenoma of colon        Plan:   Prescription drug management occurs today as follows:  Because current regimen for blood pressure is now working and tolerated, will continue medications as listed    Juanita Ruiz has been given the following recommendations today due to his elevated BP reading: lab tests ordered. Personal instruction is given. For your information, consider the following:  Alejandra Avilez is an excellent site that will give you a list of approved blood pressure devices  Giancarlo Miranda is an excellent site that will teach you how to take accurate bp measurements at home. Significant weight loss can result in a drop of 5-10mm blood pressure. A DASH diet (see bookstore or go to www.[employer] and print DASH diet) will lower 8-14mm blood pressure. Salt restriction will lower your bp 2-8mm. Lowering alcohol consumption to moderate use will lower your pressure 2-4mm. Continue efforts to maintain an exercise regimen. Normal blood pressure target is now 120/80. Stage 1 or \"pre-hypertension\" or \"high normal hypertension\" is 130-139/80-89. We sometimes begin treatment with medications. Stage 2 is >140/90 which is when we always begin treatment with medications. For high risk patients such as those with heart disease, a history of stents, angioplasty, heart attacks, strokes, diabetes and chronic kidney disease,your blood pressure goal is 130/80. For lower risk patients without the high risk categories, your goal for blood pressure is 139/89. Unless you are older than 72years old we may try for a systolic bp of 711 or we will accept higher pressures if the lower pressures are not tolerated. Still needs colonosocpy  He wants a trial to wean off zoloft    1.  Primary hypertension  -     Basic Metabolic Panel; Future  -     olmesartan (BENICAR) 20 MG tablet; Take 1 tablet by mouth daily, Disp-90 tablet, R-1Normal  2. Controlled type 2 diabetes mellitus without complication, without long-term current use of insulin ([address]  -     Basic Metabolic Panel; Future  -     Hemoglobin A1C; Future  3. Family history of coronary artery bypass surgery  -     Lipid Panel; Future  -     simvastatin (ZOCOR) 10 MG tablet; Take 1 tablet by mouth nightly TAKE ONE TABLET BY MOUTH AT BEDTIME, Disp-90 tablet, R-1Normal  4. Benign prostatic hyperplasia with urinary hesitancy  -     PSA, Diagnostic; Future  5. Dyslipidemia  -     Lipid Panel; Future  -     simvastatin (ZOCOR) 10 MG tablet; Take 1 tablet by mouth nightly TAKE ONE TABLET BY MOUTH AT BEDTIME, Disp-90 tablet, R-1Normal  6. Recurrent major depressive disorder, in partial remission (HCC)  -     sertraline (ZOLOFT) 100 MG tablet; Take 1.5 tablets by mouth daily, Disp-135 tablet, R-1Normal  -     Ambulatory referral to Psychiatry  7. Tubular adenoma of colon  -     Amb External Referral To Gastroenterology    Followup:  Return for 6 mo for bp recheck.

## 2022-09-28 LAB
ANION GAP SERPL CALC-SCNC: 3 MMOL/L (ref 4–13)
BUN SERPL-MCNC: 11 MG/DL (ref 8–23)
CALCIUM SERPL-MCNC: 10.4 MG/DL (ref 8.3–10.4)
CHLORIDE SERPL-SCNC: 106 MMOL/L (ref 101–110)
CHOLEST SERPL-MCNC: 162 MG/DL
CO2 SERPL-SCNC: 29 MMOL/L (ref 21–32)
CREAT SERPL-MCNC: 1.3 MG/DL (ref 0.8–1.5)
GLUCOSE SERPL-MCNC: 125 MG/DL (ref 65–100)
HDLC SERPL-MCNC: 42 MG/DL (ref 40–60)
HDLC SERPL: 3.9 {RATIO}
LDLC SERPL CALC-MCNC: 96.2 MG/DL
POTASSIUM SERPL-SCNC: 3.8 MMOL/L (ref 3.5–5.1)
SODIUM SERPL-SCNC: 138 MMOL/L (ref 136–145)
TRIGL SERPL-MCNC: 119 MG/DL (ref 35–150)
VLDLC SERPL CALC-MCNC: 23.8 MG/DL (ref 6–23)

## 2022-10-20 DIAGNOSIS — R00.1 BRADYCARDIA: Primary | ICD-10-CM

## 2022-10-20 DIAGNOSIS — Z95.0 CARDIAC PACEMAKER: ICD-10-CM

## 2022-10-21 DIAGNOSIS — Z95.0 PACEMAKER: Primary | ICD-10-CM

## 2022-10-27 PROBLEM — Z00.00 MEDICARE ANNUAL WELLNESS VISIT, SUBSEQUENT: Chronic | Status: RESOLVED | Noted: 2019-04-16 | Resolved: 2022-10-27

## 2022-11-01 ENCOUNTER — OFFICE VISIT (OUTPATIENT)
Dept: NEUROLOGY | Age: 67
End: 2022-11-01
Payer: MEDICARE

## 2022-11-01 DIAGNOSIS — F03.90 DEMENTIA WITHOUT BEHAVIORAL DISTURBANCE, PSYCHOTIC DISTURBANCE, MOOD DISTURBANCE, OR ANXIETY, UNSPECIFIED DEMENTIA SEVERITY, UNSPECIFIED DEMENTIA TYPE (HCC): Primary | ICD-10-CM

## 2022-11-01 PROCEDURE — G8484 FLU IMMUNIZE NO ADMIN: HCPCS | Performed by: PSYCHIATRY & NEUROLOGY

## 2022-11-01 PROCEDURE — G8420 CALC BMI NORM PARAMETERS: HCPCS | Performed by: PSYCHIATRY & NEUROLOGY

## 2022-11-01 PROCEDURE — 1036F TOBACCO NON-USER: CPT | Performed by: PSYCHIATRY & NEUROLOGY

## 2022-11-01 PROCEDURE — G8427 DOCREV CUR MEDS BY ELIG CLIN: HCPCS | Performed by: PSYCHIATRY & NEUROLOGY

## 2022-11-01 PROCEDURE — 99214 OFFICE O/P EST MOD 30 MIN: CPT | Performed by: PSYCHIATRY & NEUROLOGY

## 2022-11-01 PROCEDURE — 3017F COLORECTAL CA SCREEN DOC REV: CPT | Performed by: PSYCHIATRY & NEUROLOGY

## 2022-11-01 PROCEDURE — 1123F ACP DISCUSS/DSCN MKR DOCD: CPT | Performed by: PSYCHIATRY & NEUROLOGY

## 2022-11-01 RX ORDER — DONEPEZIL HYDROCHLORIDE 10 MG/1
TABLET, FILM COATED ORAL
Qty: 30 TABLET | Refills: 3 | Status: SHIPPED | OUTPATIENT
Start: 2022-11-01

## 2022-11-01 ASSESSMENT — ENCOUNTER SYMPTOMS
EYES NEGATIVE: 1
ALLERGIC/IMMUNOLOGIC NEGATIVE: 1
RESPIRATORY NEGATIVE: 1
GASTROINTESTINAL NEGATIVE: 1

## 2022-11-01 NOTE — PROGRESS NOTES
[place] , [address] [facility], [address]  Phone: [phone] Fax [phone]  Dr. Isha Caballero      11/1/2022  Ermelinda Tao III     Patient is referred by the following provider for consultation regarding as below:       I reviewed the available records and notes and have examined patient with the following findings:     Chief Complaint:  No chief complaint on file. HPI: This is a right handed 75 y.o.  male here with his sister. Last time he is here alone 1 year ago. The patient is apparently has been having memory loss although his sister says he is not having memory loss. She thinks it is all the anxiety that makes him do this. He does feel he is having memory problems and I would agree with him. The patient does have this history with speech changes that is very much very partial expressive changes. On top of that he does this stuttering occasionally which is what I believe he is using to slow down the conversation so he can catch his mind can catch up. On top of this he is not repeating questions or conversations he is very terrible with numbers a calendar and postop notes he uses per religiously at this point. And states he relies on it but last time he did not. His bills are automatically drafted. Difficulties in going from point a to point B where he has to think through the process but he is driving safely. He does not appear to have any visual spatial changes. A year ago we put him on Namenda 10 twice a day. And he refuses to get an MRI. CT scan did show large ventricles but his gait is intact. IMAGING REVIEW:  I REVIEWED PERTINENT  IMAGES AND REPORTS WITH THE PATIENT PERSONALLY, DIRECTLY AND FULLY. Past Medical History:  Past Medical History:   Diagnosis Date    Anxiety     controlled with med.      BPH (benign prostatic hyperplasia) 12/9/2014    BPH (benign prostatic hypertrophy)     Chest pain     Chronic prostatitis 12/9/2014    Depression     controlled with med.      Deviated nasal septum Dr. Corral Matters    DJD (degenerative joint disease), lumbosacral 10.2014 primarily L2-3    Dyslipidemia     Dyspnea     Family history of coronary artery bypass surgery 5/29/2009    GERD (gastroesophageal reflux disease)     controlled with meds    History of kidney stones     passed without surgery    HTN (hypertension) 5/29/2009    Hypercalcemia     Hyperhomocysteinemia ([address]     Hyperlipidemia     controlled with med    Hypertension x 7 yrs    controlled with meds    Hypertriglyceridemia 08.2011    Pacemaker 2009    Pre-diabetes 02.2015    Sick sinus syndrome ([address]     Syncope and collapse 5/29/2009    Tubular adenoma 2006, 2010 [employer]    Tubular adenoma of colon 8/13/2015    Vitamin D deficiency        Past Surgical History:  Past Surgical History:   Procedure Laterality Date    COLONOSCOPY  2010    LIPOMA RESECTION      11/22/17    ORTHOPEDIC SURGERY      repair of fx. elbow    [address] [facility]    left  shoulder rotator cuff    PACEMAKER  6/1/2009    biotronik       Social History:  Social History     Socioeconomic History    Marital status: Single     Spouse name: Not on file    Number of children: Not on file    Years of education: Not on file    Highest education level: Not on file   Occupational History    Not on file   Tobacco Use    Smoking status: Never    Smokeless tobacco: Never   Substance and Sexual Activity    Alcohol use: No    Drug use: No    Sexual activity: Not on file   Other Topics Concern    Not on file   Social History Narrative    Not on file     Social Determinants of Health     Financial Resource Strain: Not on file   Food Insecurity: Not on file   Transportation Needs: Not on file   Physical Activity: Sufficiently Active    Days of Exercise per Week: 4 days    Minutes of Exercise per Session: 50 min   Stress: Not on file   Social Connections: Not on file   Intimate Partner Violence: Not on are 1+ on the right side and 1+ on the left side. Brachioradialis reflexes are 1+ on the right side and 1+ on the left side. Patellar reflexes are 1+ on the right side and 1+ on the left side. Achilles reflexes are 1+ on the right side and 1+ on the left side. Neurologic Exam     Mental Status   Attention: decreased. Concentration: decreased. Level of consciousness: alert  Knowledge: poor. He knows the year he knows the president he knows the month he knows the season he could draw a box and a clock he does not recall 911 but does recall [address]  4 attempts were made at recalling 3 of 3 objects but he could only recall 1 of 3 objects immediately each 4 attempts. And in 3 minutes he did not and was incapable of recalling 03 objects. Cranial Nerves     CN II   Visual fields full to confrontation. CN III, IV, VI   Pupils are equal, round, and reactive to light. Extraocular motions are normal.     CN VII   Facial expression full, symmetric. Motor Exam   Right arm tone: normal  Left arm tone: normal  Right leg tone: normal  Left leg tone: normal    Gait, Coordination, and Reflexes     Gait  Gait: normal    Tremor   Resting tremor: absent  Intention tremor: absent  Action tremor: absent    Reflexes   Right brachioradialis: 1+  Left brachioradialis: 1+  Right biceps: 1+  Left biceps: 1+  Right triceps: 1+  Left triceps: 1+  Right patellar: 1+  Left patellar: 1+  Right achilles: 1+  Left achilles: 1+        Assessment   Assessment / Plan:    Diagnoses and all orders for this visit:    Dementia without behavioral disturbance, psychotic disturbance, mood disturbance, or anxiety, unspecified dementia severity, unspecified dementia type (HCC) no I think it is little bit too early to tell but I am very concerned about primary progressive aphasia one of the frontal temporal lobe dementias.   It certainly is taking a little bit more of a speech then it is a memory issue at this time but memory is definitely involved. We will continue Namenda 10 twice a day and add Aricept and will get speech therapy involved. I briefly talked to them about doing a amyloid/tau PET scan but the ultimate result would be continuing treatment the way we are. Other orders  -     donepezil (ARICEPT) 10 MG tablet; Take half at hs for one month than continue with one at hs        The Diagnosis and differential diagnostic considerations, and Rx Tx were reviewed with the patient at length. No orders of the defined types were placed in this encounter. I have spent greater than 50% of visit discussing and counseling of patient  for treatment and diagnostic plan review. Total time  30 min     . Notes: Patient is to continue all medications as directed by prescribing physicians. Continuations on today's visit are made based on the patient's report of current medications.              Dr. Azra Cabral  Consultation Neurology, Neurodiagnostics and Neurotherapeutics  Neuroelectrophysiology, EEG, EMG  [facility] Neurology  [address] [place] [address], [address] [facility]  Phone:  [phone]  Fax:   [phone]

## 2022-11-29 RX ORDER — MEMANTINE HYDROCHLORIDE 10 MG/1
10 TABLET ORAL 2 TIMES DAILY
Qty: 180 TABLET | Refills: 3 | Status: SHIPPED | OUTPATIENT
Start: 2022-11-29

## 2022-11-29 NOTE — TELEPHONE ENCOUNTER
Patient requesting refill Namenda 10 mg.    Last OV: 11/01/2022  Future OV: 11/02/20023  Medication pended for approval, pharmacy verified

## 2022-12-14 ENCOUNTER — OFFICE VISIT (OUTPATIENT)
Dept: FAMILY MEDICINE CLINIC | Facility: CLINIC | Age: 67
End: 2022-12-14
Payer: MEDICARE

## 2022-12-14 VITALS
BODY MASS INDEX: 23.65 KG/M2 | WEIGHT: 189.2 LBS | DIASTOLIC BLOOD PRESSURE: 78 MMHG | OXYGEN SATURATION: 97 % | HEART RATE: 67 BPM | SYSTOLIC BLOOD PRESSURE: 140 MMHG

## 2022-12-14 DIAGNOSIS — L29.9 PRURITUS: Primary | ICD-10-CM

## 2022-12-14 DIAGNOSIS — F33.41 RECURRENT MAJOR DEPRESSIVE DISORDER, IN PARTIAL REMISSION (HCC): ICD-10-CM

## 2022-12-14 PROCEDURE — G8484 FLU IMMUNIZE NO ADMIN: HCPCS | Performed by: FAMILY MEDICINE

## 2022-12-14 PROCEDURE — 3078F DIAST BP <80 MM HG: CPT | Performed by: FAMILY MEDICINE

## 2022-12-14 PROCEDURE — G8420 CALC BMI NORM PARAMETERS: HCPCS | Performed by: FAMILY MEDICINE

## 2022-12-14 PROCEDURE — 3074F SYST BP LT 130 MM HG: CPT | Performed by: FAMILY MEDICINE

## 2022-12-14 PROCEDURE — 1123F ACP DISCUSS/DSCN MKR DOCD: CPT | Performed by: FAMILY MEDICINE

## 2022-12-14 PROCEDURE — 99213 OFFICE O/P EST LOW 20 MIN: CPT | Performed by: FAMILY MEDICINE

## 2022-12-14 PROCEDURE — G8427 DOCREV CUR MEDS BY ELIG CLIN: HCPCS | Performed by: FAMILY MEDICINE

## 2022-12-14 PROCEDURE — 3017F COLORECTAL CA SCREEN DOC REV: CPT | Performed by: FAMILY MEDICINE

## 2022-12-14 PROCEDURE — 1036F TOBACCO NON-USER: CPT | Performed by: FAMILY MEDICINE

## 2022-12-14 RX ORDER — TRIAMCINOLONE ACETONIDE 1 MG/G
CREAM TOPICAL
Qty: 28.4 G | Refills: 3 | Status: SHIPPED | OUTPATIENT
Start: 2022-12-14

## 2022-12-14 RX ORDER — SERTRALINE HYDROCHLORIDE 100 MG/1
150 TABLET, FILM COATED ORAL DAILY
Qty: 135 TABLET | Refills: 1 | Status: SHIPPED | OUTPATIENT
Start: 2022-12-14 | End: 2023-01-28

## 2022-12-14 ASSESSMENT — PATIENT HEALTH QUESTIONNAIRE - PHQ9
SUM OF ALL RESPONSES TO PHQ QUESTIONS 1-9: 7
10. IF YOU CHECKED OFF ANY PROBLEMS, HOW DIFFICULT HAVE THESE PROBLEMS MADE IT FOR YOU TO DO YOUR WORK, TAKE CARE OF THINGS AT HOME, OR GET ALONG WITH OTHER PEOPLE: 1
8. MOVING OR SPEAKING SO SLOWLY THAT OTHER PEOPLE COULD HAVE NOTICED. OR THE OPPOSITE, BEING SO FIGETY OR RESTLESS THAT YOU HAVE BEEN MOVING AROUND A LOT MORE THAN USUAL: 2
9. THOUGHTS THAT YOU WOULD BE BETTER OFF DEAD, OR OF HURTING YOURSELF: 0
SUM OF ALL RESPONSES TO PHQ9 QUESTIONS 1 & 2: 0
7. TROUBLE CONCENTRATING ON THINGS, SUCH AS READING THE NEWSPAPER OR WATCHING TELEVISION: 2
6. FEELING BAD ABOUT YOURSELF - OR THAT YOU ARE A FAILURE OR HAVE LET YOURSELF OR YOUR FAMILY DOWN: 0
5. POOR APPETITE OR OVEREATING: 0
SUM OF ALL RESPONSES TO PHQ QUESTIONS 1-9: 7
3. TROUBLE FALLING OR STAYING ASLEEP: 1
SUM OF ALL RESPONSES TO PHQ QUESTIONS 1-9: 7
4. FEELING TIRED OR HAVING LITTLE ENERGY: 2
1. LITTLE INTEREST OR PLEASURE IN DOING THINGS: 0
2. FEELING DOWN, DEPRESSED OR HOPELESS: 0
SUM OF ALL RESPONSES TO PHQ QUESTIONS 1-9: 7

## 2022-12-14 NOTE — PROGRESS NOTES
Subjective:   Ernest Sacks is a 75 y.o. male presents today for continued inguinal itching. See prior notes. This itches all the time. There is no testicular pain, fever, chills, night sweats. Conservative treatment with OTC Benadryl cream did not help. Has not noted any rash, redness or lesions. Allergies   Allergen Reactions    Ace Inhibitors Other (See Comments)     PMH, MEDS reviewed  PHQ-9 Total Score: 7 (12/14/2022 11:10 AM)  Thoughts that you would be better off dead, or of hurting yourself in some way: 0 (12/14/2022 11:10 AM)       Objective:   Blood pressure (!) 140/78, pulse 67, weight 189 lb 3.2 oz (85.8 kg), SpO2 97 %. General- Pleasant and no distress  Psych- alert and oriented to person, place and time  Mood and affect are appropriate to situation  Musculoskeletal - Gait and station examination reveals mid-position with no abnormalities. Neurological- grossly intact. rrr s mrg. Bcta  Inguinal areas examined. No evidence of any inguinal hernia. No evidence of any testicular abnormalities or epididymal swelling or tenderness. Right inguinal area near the perineum reveals normal skin no lesions noted at all. Assessment/Plan:     1. Pruritus    2. Recurrent major depressive disorder, in partial remission ([address]         1. Pruritus  -     triamcinolone (KENALOG) 0.1 % cream; Apply topically 2 times daily. , Disp-28.4 g, R-3, Normal  2. Recurrent major depressive disorder, in partial remission (HCC)  -     sertraline (ZOLOFT) 100 MG tablet; Take 1.5 tablets by mouth daily, Disp-135 tablet, R-1Normal      Followup:   Return if symptoms worsen or fail to improve.

## 2023-01-13 PROBLEM — Z00.00 MEDICARE ANNUAL WELLNESS VISIT, SUBSEQUENT: Chronic | Status: RESOLVED | Noted: 2019-04-16 | Resolved: 2023-01-13

## 2023-01-31 ENCOUNTER — OFFICE VISIT (OUTPATIENT)
Dept: FAMILY MEDICINE CLINIC | Facility: CLINIC | Age: 68
End: 2023-01-31
Payer: MEDICARE

## 2023-01-31 VITALS
HEART RATE: 71 BPM | HEIGHT: 75 IN | SYSTOLIC BLOOD PRESSURE: 127 MMHG | DIASTOLIC BLOOD PRESSURE: 78 MMHG | WEIGHT: 195.4 LBS | OXYGEN SATURATION: 95 % | BODY MASS INDEX: 24.29 KG/M2

## 2023-01-31 DIAGNOSIS — I10 PRIMARY HYPERTENSION: ICD-10-CM

## 2023-01-31 DIAGNOSIS — F03.90 DEMENTIA WITHOUT BEHAVIORAL DISTURBANCE, PSYCHOTIC DISTURBANCE, MOOD DISTURBANCE, OR ANXIETY, UNSPECIFIED DEMENTIA SEVERITY, UNSPECIFIED DEMENTIA TYPE (HCC): ICD-10-CM

## 2023-01-31 DIAGNOSIS — E11.9 CONTROLLED TYPE 2 DIABETES MELLITUS WITHOUT COMPLICATION, WITHOUT LONG-TERM CURRENT USE OF INSULIN (HCC): Primary | ICD-10-CM

## 2023-01-31 DIAGNOSIS — F01.A0 MILD VASCULAR DEMENTIA WITHOUT BEHAVIORAL DISTURBANCE, PSYCHOTIC DISTURBANCE, MOOD DISTURBANCE, OR ANXIETY: ICD-10-CM

## 2023-01-31 DIAGNOSIS — F33.41 RECURRENT MAJOR DEPRESSIVE DISORDER, IN PARTIAL REMISSION (HCC): ICD-10-CM

## 2023-01-31 DIAGNOSIS — I49.5 SICK SINUS SYNDROME (HCC): ICD-10-CM

## 2023-01-31 DIAGNOSIS — Z82.49 FAMILY HISTORY OF CORONARY ARTERY BYPASS SURGERY: ICD-10-CM

## 2023-01-31 DIAGNOSIS — E78.5 DYSLIPIDEMIA: ICD-10-CM

## 2023-01-31 LAB
ANION GAP SERPL CALC-SCNC: 4 MMOL/L (ref 2–11)
BUN SERPL-MCNC: 15 MG/DL (ref 8–23)
CALCIUM SERPL-MCNC: 10.1 MG/DL (ref 8.3–10.4)
CHLORIDE SERPL-SCNC: 107 MMOL/L (ref 101–110)
CO2 SERPL-SCNC: 29 MMOL/L (ref 21–32)
CREAT SERPL-MCNC: 1.3 MG/DL (ref 0.8–1.5)
EST. AVERAGE GLUCOSE BLD GHB EST-MCNC: 126 MG/DL
GLUCOSE SERPL-MCNC: 144 MG/DL (ref 65–100)
HBA1C MFR BLD: 6 % (ref 4.8–5.6)
POTASSIUM SERPL-SCNC: 4 MMOL/L (ref 3.5–5.1)
SODIUM SERPL-SCNC: 140 MMOL/L (ref 133–143)

## 2023-01-31 PROCEDURE — G8420 CALC BMI NORM PARAMETERS: HCPCS | Performed by: FAMILY MEDICINE

## 2023-01-31 PROCEDURE — 1036F TOBACCO NON-USER: CPT | Performed by: FAMILY MEDICINE

## 2023-01-31 PROCEDURE — 3074F SYST BP LT 130 MM HG: CPT | Performed by: FAMILY MEDICINE

## 2023-01-31 PROCEDURE — 2022F DILAT RTA XM EVC RTNOPTHY: CPT | Performed by: FAMILY MEDICINE

## 2023-01-31 PROCEDURE — 1123F ACP DISCUSS/DSCN MKR DOCD: CPT | Performed by: FAMILY MEDICINE

## 2023-01-31 PROCEDURE — 99214 OFFICE O/P EST MOD 30 MIN: CPT | Performed by: FAMILY MEDICINE

## 2023-01-31 PROCEDURE — 3046F HEMOGLOBIN A1C LEVEL >9.0%: CPT | Performed by: FAMILY MEDICINE

## 2023-01-31 PROCEDURE — 3078F DIAST BP <80 MM HG: CPT | Performed by: FAMILY MEDICINE

## 2023-01-31 PROCEDURE — 3017F COLORECTAL CA SCREEN DOC REV: CPT | Performed by: FAMILY MEDICINE

## 2023-01-31 PROCEDURE — G8484 FLU IMMUNIZE NO ADMIN: HCPCS | Performed by: FAMILY MEDICINE

## 2023-01-31 PROCEDURE — G8427 DOCREV CUR MEDS BY ELIG CLIN: HCPCS | Performed by: FAMILY MEDICINE

## 2023-01-31 RX ORDER — OLMESARTAN MEDOXOMIL 20 MG/1
20 TABLET ORAL DAILY
Qty: 90 TABLET | Refills: 1 | Status: SHIPPED | OUTPATIENT
Start: 2023-01-31

## 2023-01-31 RX ORDER — SIMVASTATIN 10 MG
10 TABLET ORAL NIGHTLY
Qty: 90 TABLET | Refills: 1 | Status: SHIPPED | OUTPATIENT
Start: 2023-01-31

## 2023-01-31 RX ORDER — SERTRALINE HYDROCHLORIDE 100 MG/1
150 TABLET, FILM COATED ORAL DAILY
Qty: 135 TABLET | Refills: 1 | Status: SHIPPED | OUTPATIENT
Start: 2023-01-31 | End: 2023-03-17

## 2023-01-31 ASSESSMENT — PATIENT HEALTH QUESTIONNAIRE - PHQ9
6. FEELING BAD ABOUT YOURSELF - OR THAT YOU ARE A FAILURE OR HAVE LET YOURSELF OR YOUR FAMILY DOWN: 0
9. THOUGHTS THAT YOU WOULD BE BETTER OFF DEAD, OR OF HURTING YOURSELF: 0
3. TROUBLE FALLING OR STAYING ASLEEP: 0
10. IF YOU CHECKED OFF ANY PROBLEMS, HOW DIFFICULT HAVE THESE PROBLEMS MADE IT FOR YOU TO DO YOUR WORK, TAKE CARE OF THINGS AT HOME, OR GET ALONG WITH OTHER PEOPLE: 0
8. MOVING OR SPEAKING SO SLOWLY THAT OTHER PEOPLE COULD HAVE NOTICED. OR THE OPPOSITE, BEING SO FIGETY OR RESTLESS THAT YOU HAVE BEEN MOVING AROUND A LOT MORE THAN USUAL: 0
5. POOR APPETITE OR OVEREATING: 0
SUM OF ALL RESPONSES TO PHQ QUESTIONS 1-9: 0
2. FEELING DOWN, DEPRESSED OR HOPELESS: 0
SUM OF ALL RESPONSES TO PHQ QUESTIONS 1-9: 0
SUM OF ALL RESPONSES TO PHQ QUESTIONS 1-9: 0
SUM OF ALL RESPONSES TO PHQ9 QUESTIONS 1 & 2: 0
4. FEELING TIRED OR HAVING LITTLE ENERGY: 0
7. TROUBLE CONCENTRATING ON THINGS, SUCH AS READING THE NEWSPAPER OR WATCHING TELEVISION: 0
SUM OF ALL RESPONSES TO PHQ QUESTIONS 1-9: 0
1. LITTLE INTEREST OR PLEASURE IN DOING THINGS: 0

## 2023-01-31 NOTE — PROGRESS NOTES
Subjective:  Herman Zheng is a 75 y.o. male presents today for their semi-annual htn and diet-controlled diabetes, and dyslipidemia and associated refills visit. They are having no side effects and are doing well. He also has depression, due for refills. Other [address] diagnosis is as noted  Systems review of cardiovascular and pulmonary systems reveal no complaints or pertinent positives. Patient denies any pounding heart beats or rapid heart beat intervals, flushing, panic like attacks, headaches, dizzyness or flushing. If you don't have a home bp machine, you should purchase one at home and begin to check your pressure at home on a regular basis. Your blood pressure goal is listed under the \"plan section\" below  PHQ-9 Total Score: 0 (1/31/2023 10:44 AM)  Thoughts that you would be better off dead, or of hurting yourself in some way: 0 (1/31/2023 10:44 AM)    Allergies   Allergen Reactions    Ace Inhibitors Other (See Comments)      reports that he has never smoked. He has never used smokeless tobacco.  Current Outpatient Medications   Medication Sig Dispense Refill    sertraline (ZOLOFT) 100 MG tablet Take 1.5 tablets by mouth daily 135 tablet 1    olmesartan (BENICAR) 20 MG tablet Take 1 tablet by mouth daily 90 tablet 1    simvastatin (ZOCOR) 10 MG tablet Take 1 tablet by mouth nightly TAKE ONE TABLET BY MOUTH AT BEDTIME 90 tablet 1    triamcinolone (KENALOG) 0.1 % cream Apply topically 2 times daily. 28.4 g 3    memantine (NAMENDA) 10 MG tablet Take 1 tablet by mouth 2 times daily 180 tablet 3    donepezil (ARICEPT) 10 MG tablet Take half at hs for one month than continue with one at hs 30 tablet 3    Aspirin 81 MG CAPS Take 81 mg by mouth daily       No current facility-administered medications for this visit.        Lab Results   Component Value Date/Time     09/27/2022 10:33 AM    K 3.8 09/27/2022 10:33 AM     09/27/2022 10:33 AM    CO2 29 09/27/2022 10:33 AM    BUN 11 09/27/2022 10:33 AM    CREATININE 1.30 09/27/2022 10:33 AM    GLUCOSE 125 09/27/2022 10:33 AM    CALCIUM 10.4 09/27/2022 10:33 AM          Objective:  Blood pressure 127/78, pulse 71, height 6' 3\" (1.905 m), weight 195 lb 6.4 oz (88.6 kg), SpO2 95 %. Body mass index is 24.42 kg/m². BP Readings from Last 3 Encounters:   01/31/23 127/78   12/14/22 (!) 140/78   09/27/22 128/76     General- Pleasant and no distress  Psych- alert and oriented to person, place and time  Mood and affect are appropriate to situation  Musculoskeletal - Gait and station examination reveals mid-position with no abnormalities. Neurological- grossly intact. rrr s mrg.   bcta  extremeties are without edema, and dp, and pt pulses are intact  No carotid bruits   Fundoscopic exam is: benign without retinopathology     Assessment:  1. Controlled type 2 diabetes mellitus without complication, without long-term current use of insulin ([address]    2. Recurrent major depressive disorder, in partial remission ([address]    3. Dementia without behavioral disturbance, psychotic disturbance, mood disturbance, or anxiety, unspecified dementia severity, unspecified dementia type ([address]    4. Sick sinus syndrome ([address]    5. Mild vascular dementia without behavioral disturbance, psychotic disturbance, mood disturbance, or anxiety    6. Primary hypertension    7. Family history of coronary artery bypass surgery    8. Dyslipidemia        Plan:   Prescription drug management occurs today as follows:  Because current regimen for blood pressure is now working and tolerated, will continue medications as listed    Lina Conner has been given the following recommendations today due to his elevated BP reading: lab tests ordered. Personal instruction is given.   For your information, consider the following:  Rajesh Lugo is an excellent site that will give you a list of approved blood pressure devices  [employer] is an excellent site that will teach you how to take accurate bp measurements at home.  Significant weight loss can result in a drop of 5-10mm blood pressure. A DASH diet (see bookstore or go to www.[employer] and print DASH diet) will lower 8-14mm blood pressure. Salt restriction will lower your bp 2-8mm. Lowering alcohol consumption to moderate use will lower your pressure 2-4mm. Continue efforts to maintain an exercise regimen. Normal blood pressure target is now 120/80. Stage 1 or \"pre-hypertension\" or \"high normal hypertension\" is 130-139/80-89. We sometimes begin treatment with medications. Stage 2 is >140/90 which is when we always begin treatment with medications. For high risk patients such as those with heart disease, a history of stents, angioplasty, heart attacks, strokes, diabetes and chronic kidney disease,your blood pressure goal is 130/80. For lower risk patients without the high risk categories, your goal for blood pressure is 139/89. Unless you are older than 72years old we may try for a systolic bp of 846 or we will accept higher pressures if the lower pressures are not tolerated. 1. Controlled type 2 diabetes mellitus without complication, without long-term current use of insulin ([facility]  -     Basic Metabolic Panel; Future  -     Hemoglobin A1C; Future  -     Microalbumin / Creatinine Urine Ratio; Future  2. Recurrent major depressive disorder, in partial remission (HCC)  -     sertraline (ZOLOFT) 100 MG tablet; Take 1.5 tablets by mouth daily, Disp-135 tablet, R-1Normal  3. Dementia without behavioral disturbance, psychotic disturbance, mood disturbance, or anxiety, unspecified dementia severity, unspecified dementia type ([facility]  4. Sick sinus syndrome [facility])  Assessment & Plan:   Monitored by specialist- no acute findings meriting change in the plan    5.  Mild vascular dementia without behavioral disturbance, psychotic disturbance, mood disturbance, or anxiety  Assessment & Plan:   Monitored by specialist- no acute findings meriting change in the plan    6. Primary hypertension  -     Basic Metabolic Panel; Future  -     olmesartan (BENICAR) 20 MG tablet; Take 1 tablet by mouth daily, Disp-90 tablet, R-1Normal  -     Microalbumin / Creatinine Urine Ratio; Future  7. Family history of coronary artery bypass surgery  -     simvastatin (ZOCOR) 10 MG tablet; Take 1 tablet by mouth nightly TAKE ONE TABLET BY MOUTH AT BEDTIME, Disp-90 tablet, R-1Normal  8. Dyslipidemia  -     simvastatin (ZOCOR) 10 MG tablet; Take 1 tablet by mouth nightly TAKE ONE TABLET BY MOUTH AT BEDTIME, Disp-90 tablet, R-1Normal      Followup:  Return for 6 mo for htn, lipids, prostate.

## 2023-02-03 ENCOUNTER — OFFICE VISIT (OUTPATIENT)
Age: 68
End: 2023-02-03

## 2023-02-03 DIAGNOSIS — R41.841 COGNITIVE COMMUNICATION DEFICIT: Primary | ICD-10-CM

## 2023-02-03 NOTE — PROGRESS NOTES
SPEECH LANGUAGE PATHOLOGY:   INITIAL ASSESSMENT   Tiffanie Mac III  [id number]  ICD-10: Treatment Diagnosis: Cognitive Communication deficit R41.841  REFERRING PHYSICIAN: Nori Colunga DO MD Orders: evaluate and treat   Return Physician Appointment: unknown   PAST MEDICAL HISTORY:   Active Ambulatory Problems     Diagnosis Date Noted    Family history of coronary artery bypass surgery 05/29/2009    Benign prostatic hyperplasia with urinary hesitancy 12/09/2014    GERD (gastroesophageal reflux disease)     Dyslipidemia 05/29/2009    Depression     Advanced care planning/counseling discussion 04/16/2019    HTN (hypertension) 05/29/2009    Chronic prostatitis 12/09/2014    Vitamin D deficiency     Tubular adenoma of colon 08/13/2015    Hypercalcemia     Sick sinus syndrome (HCC)     DJD (degenerative joint disease), lumbosacral     Medicare annual wellness visit, subsequent 04/16/2019    Controlled type 2 diabetes mellitus without complication, without long-term current use of insulin (HCC)     Mild vascular dementia without behavioral disturbance, psychotic disturbance, mood disturbance, or anxiety 04/05/2022     Resolved Ambulatory Problems     Diagnosis Date Noted    Bradycardia 09/07/2016     Past Medical History:   Diagnosis Date    Anxiety     BPH (benign prostatic hyperplasia) 12/9/2014    BPH (benign prostatic hypertrophy)     Chest pain     Deviated nasal septum Dr. Radha White    Dyspnea     History of kidney stones     Hyperhomocysteinemia ([address]     Hyperlipidemia     Hypertension x 7 yrs    Hypertriglyceridemia 08.2011    Pacemaker 2009    Pre-diabetes 02.2015    Syncope and collapse 5/29/2009    Tubular adenoma 2006, 2010 [employer]      DATE OF ONSET: year ago    PRIOR LEVEL OF FUNCTION: independent with ADL's  PRECAUTIONS/ALLERGIES:   Allergies   Allergen Reactions    Ace Inhibitors Other (See Comments)        SUBJECTIVE: Patient is a 75year old male who has been referred for cognitive evaluation. Patient is known to this SLP from previous testing in 2021 for cognitive deficits. Since then he endorses increased short term memory issues, still continues with word finding difficulty, taking him longer to read and write. Notices spelling errors. He continues to drive and he does get lost but he's able to find his way. He reports that it happens but not often. He does cook and denies leaving the stove on but admits he can walk in a room and forget what he's doing. He remembers to take his medication daily and his bills are automatic drafted. He has three sisters who can help him but he chooses to remain independent as long as he can. ASSESSMENT:Based on the objective data described below, the patient presents with moderate to profound cognitive deficits. Moderate deficits in attention. He required multiple repetitions of questions, despite multiple repetitions, he continued to have increased difficulty with recalling a single word, number etc. He would often times lose his train of thought as well so would need repetition of direction. Profound memory deficits with immediate memory and delayed memory recall. Despite rehearsing three words times three, he could only recall one word immediately and after 5 minutes he could only recall 1/3 words. He was unable to recall name and address despite this being rehearsed three times as well. When asked the name and address later, he could not recall any of them. When given cues he was able to recall name and city only. Profound word finding deficits. He did well with confrontational naming, just an occasional articulation error but he was unable to name specific items given 1 minute. You also notice this difficulty in conversation as well. His language continues to be relatively intact.  His sentence structure is good and spelling seems to be [place]. \" He did require increased time for thought organization and processing of this task. Moderate deficit with visuospatial, which concerns me about his driving in addition to his poor memory. As stated before, he would benefit from supervision with his ADL's for safety reasons given his profound memory deficit. I did ask him what his goal was for therapy and he stated to keep the skills he currently have to stay as independent as he possibly can. Therefore I recommend therapy at 1x a week for strategies to help with managing his ADL's. Patient will benefit from skilled intervention to address the below impairments. This section established at most recent assessment??????????  PROBLEM LIST (Impairments causing functional limitations):  Short term memory  Recent memory  Attention  Word finding   GOALS: (Goals have been discussed and agreed upon with patient.)  SHORT-TERM FUNCTIONAL GOALS: Time Frame: 60 days    Patient will use external memory aids and compensatory strategies to recall routine, personal information and recent events to improve orientation to time & recall daily events with 60% accuracy and mod cues. Patient  will improve orientation by communicating mental awareness of daily routines, personal information and recent events with 60% accuracy and mod cues. Patient will demonstrate the ability to follow multi-step directions related to functional living environment with 50% accuracy with mod verbal, visual and tactile cues in order to increase functional integration into environment. Patient will demonstrate recall of functional information following a/an (immediate, shortterm, long-term) delay with mod cues in order to increase functional integration into environment at 50% accuracy. Patient will name objects, pictures, people and/or activities verbally with 80% accuracy with min  verbal, tactile and visual cues to improve verbalizations.    DISCHARGE GOALS: Time Frame: 2 months   Patient will develop functional, cognitive-linguistic-based skills and utilize compensatory strategies to communicate wants and needs effectively, maintain safety during ADLs and participate socially in functional living environment  REHABILITATION POTENTIAL FOR STATED GOALS: [id number] will benefit from skilled intervention to address the following impairments. RECOMMENDATIONS AND PLANNED INTERVENTIONS (Benefits and precautions of therapy have been discussed with the patient.):    INTERVENTIONS PLANNED: (Benefits and precautions of therapy have been discussed with the patient.)  Cognitive therapy   TREATMENT PLAN EFFECTIVE DATES:  2/3/2023 TO 4/2/2023. FREQUENCY/DURATION: Continue to follow patient 1 x week8 weeks    THERAPY RECOMMENDATIONS:   [] Cognitive therapy is not warranted at this time. [] Cognitive therapy is recommended with emphasis on the following:    [x] To improve immediate memory    [x] To improve recent memory    [x] To improve temporal orientation (recent memory   [x] To improve attention    [x] To improve word finding        Regarding , I certify that the treatment plan above will be carried out by a therapist or under their direction. Thank you for this referral,  Juan Alberto Diss. Ed CCC-SLP          Current Medications:   Current Outpatient Rx   Medication Sig Dispense Refill    sertraline (ZOLOFT) 100 MG tablet Take 1.5 tablets by mouth daily 135 tablet 1    olmesartan (BENICAR) 20 MG tablet Take 1 tablet by mouth daily 90 tablet 1    simvastatin (ZOCOR) 10 MG tablet Take 1 tablet by mouth nightly TAKE ONE TABLET BY MOUTH AT BEDTIME 90 tablet 1    triamcinolone (KENALOG) 0.1 % cream Apply topically 2 times daily.  28.4 g 3    memantine (NAMENDA) 10 MG tablet Take 1 tablet by mouth 2 times daily 180 tablet 3    donepezil (ARICEPT) 10 MG tablet Take half at hs for one month than continue with one at hs 30 tablet 3    Aspirin 81 MG CAPS Take 81 mg by mouth daily Date Last Reviewed: 2/3/2023  Social History/Home Situation: Marital Status:       Work/Activity History: 36 years as      OBJECTIVE:COGNITION TESTING:  [place]'s Cognitive Examination (ACE-III)       Section Score   Attention 10/18   Memory 3/26   Fluency 4/14   Language 23/26   Visuospatial 11/16   TOTAL ACE-III SCORE 51/100         __________________________________________________________  Total Treatment Duration: 39 minutes         Jose Connor

## 2023-02-16 ENCOUNTER — OFFICE VISIT (OUTPATIENT)
Age: 68
End: 2023-02-16

## 2023-02-16 DIAGNOSIS — R41.841 COGNITIVE COMMUNICATION DEFICIT: Primary | ICD-10-CM

## 2023-02-16 NOTE — PROGRESS NOTES
SPEECH/LANGUAGE/COGNITIVE    DAILY NOTE        NAME/AGE/GENDER:   Wilfrid Monsalve III  [id number]  1955  ICD-10: Treatment Diagnosis: R41.841 Cognitive-Communication Deficit  Precautions/Allergies: Allergies   Allergen Reactions    Ace Inhibitors Other (See Comments)     TREATMENT PLAN:  Effective Dates: 2/16/2023 TO 4/2/2023. Frequency/Duration: once a week for 60 Day(s) MEDICAL/REFERRING DIAGNOSIS:    ICD-10-CM    1. Cognitive communication deficit  R41.841           REFERRING PHYSICIAN:Mariaelena Flores DO MD Orders: evaluate and treat       TREATMENT DATE: 02/16/23     PROBLEM LIST (Impairments causing functional limitations):  Cognitive decline  INTERVENTIONS PLANNED:   [] Speech/Lang Therapy  58040 [] Dysphagia Therapy  85120   [x] TherIvntj Cog Funcj Contact (1st 15 min)  51433 [x] TherIvntj Cog Funcj Contact (addt'l 15 min)  [id number]          SUBJECTIVE: patient arrived to therapy alone and without complaints. Pain: No complaints of pain. Cognitive Linguistic:  Daily orientation:   Day of the week: +  Month +  Year +    2. Recalled recent events with minimal assist. Some difficulty with speech. 3. Attention task at:  1st trial: 0 errors  2nd trial: 1 error   3rd trial: 5 errors     4. Follow oral and written directions 2 step 4 components: Mod I 90% accuracy     5. Immediate recall (numbers)  3 numbers: Independently at 100% accuracy   4 numbers: Max A with 35% accuracy due to decreased attention and memory recall and increased anxiety which I feel impacted his recall. 6. Mental manipulation:   Addition: Mod I 90% accuracy   Subtraction: Mod I 90% accuracy   Combination: Mod I 90% accuracy     7. Provide category member with a specific letter at: Mod I 93% accuracy     8. Communication starters to help with word finding. He had mild difficulty with articulation errors. Patient/Family/Caregiver Education:  Educated patient on the importance of daily orientation.       IMPRESSIONS:   Overall patient did well. He does present with decreased attention and memory recall especially with more complex tasks. Did well with following directions and mental manipulation but required frequent repetitions. Some difficulty with conversational speech due to rush speech and articulation errors.     Assessment: [x] Progressing toward goals.    [] No change.     [] Other:  [x] Patient would continue to benefit from skilled physical therapy services to address speech and cognitive decline.            Plan: [x] Continue current frequency toward long and short term goals.    [] Specific Instructions for subsequent treatments:       Virginia Simms M.Ed CCC-SLP      Total Time 60 minutes

## 2023-02-21 ENCOUNTER — OFFICE VISIT (OUTPATIENT)
Dept: FAMILY MEDICINE CLINIC | Facility: CLINIC | Age: 68
End: 2023-02-21
Payer: MEDICARE

## 2023-02-21 VITALS
HEART RATE: 67 BPM | WEIGHT: 195 LBS | OXYGEN SATURATION: 96 % | HEIGHT: 75 IN | BODY MASS INDEX: 24.25 KG/M2 | SYSTOLIC BLOOD PRESSURE: 112 MMHG | DIASTOLIC BLOOD PRESSURE: 71 MMHG

## 2023-02-21 DIAGNOSIS — Z95.0 PACEMAKER: ICD-10-CM

## 2023-02-21 DIAGNOSIS — L29.9 PRURITUS: Primary | ICD-10-CM

## 2023-02-21 PROCEDURE — 3074F SYST BP LT 130 MM HG: CPT | Performed by: FAMILY MEDICINE

## 2023-02-21 PROCEDURE — 1123F ACP DISCUSS/DSCN MKR DOCD: CPT | Performed by: FAMILY MEDICINE

## 2023-02-21 PROCEDURE — G8484 FLU IMMUNIZE NO ADMIN: HCPCS | Performed by: FAMILY MEDICINE

## 2023-02-21 PROCEDURE — G8420 CALC BMI NORM PARAMETERS: HCPCS | Performed by: FAMILY MEDICINE

## 2023-02-21 PROCEDURE — 99212 OFFICE O/P EST SF 10 MIN: CPT | Performed by: FAMILY MEDICINE

## 2023-02-21 PROCEDURE — 3078F DIAST BP <80 MM HG: CPT | Performed by: FAMILY MEDICINE

## 2023-02-21 PROCEDURE — G8427 DOCREV CUR MEDS BY ELIG CLIN: HCPCS | Performed by: FAMILY MEDICINE

## 2023-02-21 PROCEDURE — 1036F TOBACCO NON-USER: CPT | Performed by: FAMILY MEDICINE

## 2023-02-21 PROCEDURE — 3017F COLORECTAL CA SCREEN DOC REV: CPT | Performed by: FAMILY MEDICINE

## 2023-02-21 SDOH — ECONOMIC STABILITY: INCOME INSECURITY: HOW HARD IS IT FOR YOU TO PAY FOR THE VERY BASICS LIKE FOOD, HOUSING, MEDICAL CARE, AND HEATING?: NOT HARD AT ALL

## 2023-02-21 SDOH — ECONOMIC STABILITY: HOUSING INSECURITY
IN THE LAST 12 MONTHS, WAS THERE A TIME WHEN YOU DID NOT HAVE A STEADY PLACE TO SLEEP OR SLEPT IN A SHELTER (INCLUDING NOW)?: NO

## 2023-02-21 SDOH — ECONOMIC STABILITY: TRANSPORTATION INSECURITY
IN THE PAST 12 MONTHS, HAS LACK OF TRANSPORTATION KEPT YOU FROM MEETINGS, WORK, OR FROM GETTING THINGS NEEDED FOR DAILY LIVING?: NO

## 2023-02-21 SDOH — ECONOMIC STABILITY: FOOD INSECURITY: WITHIN THE PAST 12 MONTHS, YOU WORRIED THAT YOUR FOOD WOULD RUN OUT BEFORE YOU GOT MONEY TO BUY MORE.: NEVER TRUE

## 2023-02-21 SDOH — ECONOMIC STABILITY: FOOD INSECURITY: WITHIN THE PAST 12 MONTHS, THE FOOD YOU BOUGHT JUST DIDN'T LAST AND YOU DIDN'T HAVE MONEY TO GET MORE.: NEVER TRUE

## 2023-02-21 ASSESSMENT — PATIENT HEALTH QUESTIONNAIRE - PHQ9
1. LITTLE INTEREST OR PLEASURE IN DOING THINGS: 0
4. FEELING TIRED OR HAVING LITTLE ENERGY: 0
SUM OF ALL RESPONSES TO PHQ QUESTIONS 1-9: 0
2. FEELING DOWN, DEPRESSED OR HOPELESS: 0
SUM OF ALL RESPONSES TO PHQ QUESTIONS 1-9: 0
SUM OF ALL RESPONSES TO PHQ9 QUESTIONS 1 & 2: 0
3. TROUBLE FALLING OR STAYING ASLEEP: 0
5. POOR APPETITE OR OVEREATING: 0
10. IF YOU CHECKED OFF ANY PROBLEMS, HOW DIFFICULT HAVE THESE PROBLEMS MADE IT FOR YOU TO DO YOUR WORK, TAKE CARE OF THINGS AT HOME, OR GET ALONG WITH OTHER PEOPLE: 0
9. THOUGHTS THAT YOU WOULD BE BETTER OFF DEAD, OR OF HURTING YOURSELF: 0
6. FEELING BAD ABOUT YOURSELF - OR THAT YOU ARE A FAILURE OR HAVE LET YOURSELF OR YOUR FAMILY DOWN: 0
7. TROUBLE CONCENTRATING ON THINGS, SUCH AS READING THE NEWSPAPER OR WATCHING TELEVISION: 0
8. MOVING OR SPEAKING SO SLOWLY THAT OTHER PEOPLE COULD HAVE NOTICED. OR THE OPPOSITE, BEING SO FIGETY OR RESTLESS THAT YOU HAVE BEEN MOVING AROUND A LOT MORE THAN USUAL: 0
SUM OF ALL RESPONSES TO PHQ QUESTIONS 1-9: 0
SUM OF ALL RESPONSES TO PHQ QUESTIONS 1-9: 0

## 2023-02-21 NOTE — PROGRESS NOTES
Subjective:   Claudine Bautista is a 70 y.o. male presents today for recheck of inguinal itching. The triamcinolone 0.1% cream did not help at all. The area continues to itch tremendously day and night. Still very localized. There has never really been any redness. PHQ-9 Total Score: 0 (2/21/2023  3:31 PM)  Thoughts that you would be better off dead, or of hurting yourself in some way: 0 (2/21/2023  3:31 PM)    Allergies   Allergen Reactions    Ace Inhibitors Other (See Comments)     PMH, MEDS reviewed     Objective:   Blood pressure 112/71, pulse 67, height 6' 3\" (1.905 m), weight 195 lb (88.5 kg), SpO2 96 %. General- Pleasant and no distress  Psych- alert and oriented to person, place and time  Mood and affect are appropriate to situation  Musculoskeletal - Gait and station examination reveals mid-position with no abnormalities. Neurological- grossly intact. Inguinal area again reveals no abnormalities to inspection and skin  Assessment/Plan:     1. Pruritus         1. Pruritus  -     Amb External Referral To Dermatology    Followup:   Return if symptoms worsen or fail to improve.

## 2023-03-01 ENCOUNTER — OFFICE VISIT (OUTPATIENT)
Age: 68
End: 2023-03-01

## 2023-03-01 DIAGNOSIS — R41.841 COGNITIVE COMMUNICATION DEFICIT: Primary | ICD-10-CM

## 2023-03-01 PROBLEM — Z00.00 MEDICARE ANNUAL WELLNESS VISIT, SUBSEQUENT: Chronic | Status: RESOLVED | Noted: 2019-04-16 | Resolved: 2023-03-01

## 2023-03-01 NOTE — PROGRESS NOTES
SPEECH/LANGUAGE/COGNITIVE    DAILY NOTE        NAME/AGE/GENDER:   Evan Oneill III  [id number]  1955  ICD-10: Treatment Diagnosis: R41.841 Cognitive-Communication Deficit  Precautions/Allergies: Allergies   Allergen Reactions    Ace Inhibitors Other (See Comments)     TREATMENT PLAN:  Effective Dates: 2/16/2023 TO 4/2/2023. Frequency/Duration: once a week for 60 Day(s) MEDICAL/REFERRING DIAGNOSIS:  No diagnosis found. REFERRING PHYSICIAN:Eliot Flores DO MD Orders: evaluate and treat       TREATMENT DATE: 03/01/23     PROBLEM LIST (Impairments causing functional limitations):  Cognitive decline  INTERVENTIONS PLANNED:   [] Speech/Lang Therapy  53833 [] Dysphagia Therapy  26732   [x] TherIvntj Cog Funcj Contact (1st 15 min)  15897 [x] TherIvntj Cog Funcj Contact (addt'l 15 min)  [id number]          SUBJECTIVE: patient arrived to therapy alone and without complaints. Pain: No complaints of pain. Cognitive Linguistic:  Daily orientation:   Day of the week: +  Month +  Year +    2. Recalled recent events with minimal assist. Some difficulty with speech. 3. Attention task at:  1st trial: 1 error  2nd trial: 0 errors   3rd trial: 1 error    4. Follow oral and written directions 2 step 4 components: at Min to Mod I 85% accuracy with 2 repetitions     5. Immediate recall (numbers)  4 numbers: Mod A with 50% accuracy due to decreased attention and memory recall and increased anxiety which I feel impacted his recall. Wrote down 4 digit numbers at Mod I 90% accuracy once he settled down and his anxiety decreased. 6. Mental manipulation:   Addition: Mod I 90% accuracy   Subtraction: Mod I 90% accuracy   Combination: Mod I 90% accuracy     . Patient/Family/Caregiver Education:  Educated patient on the importance of daily orientation. IMPRESSIONS:   Overall patient did well. He does present with decreased attention and memory recall especially with more complex tasks.  Increased anxiety noted during memory tasks which significantly impacts his accuracy during these tasks. I feel that his anxiety needs to be better controlled as this will impact his memory, processing and ability to effectively communicate much worse. Some difficulty with conversational speech due to rush speech and articulation errors in addition to increased anxiety. Assessment: [x] Progressing toward goals. [] No change. [] Other:  [x] Patient would continue to benefit from skilled physical therapy services to address speech and cognitive decline. Plan: [x] Continue current frequency toward long and short term goals. [] Specific Instructions for subsequent treatments:       Samra Salas. Ed CCC-SLP      Total Time 60 minutes

## 2023-03-02 ENCOUNTER — OFFICE VISIT (OUTPATIENT)
Dept: FAMILY MEDICINE CLINIC | Facility: CLINIC | Age: 68
End: 2023-03-02
Payer: MEDICARE

## 2023-03-02 VITALS
HEART RATE: 69 BPM | SYSTOLIC BLOOD PRESSURE: 135 MMHG | DIASTOLIC BLOOD PRESSURE: 79 MMHG | BODY MASS INDEX: 24.5 KG/M2 | OXYGEN SATURATION: 97 % | WEIGHT: 196 LBS

## 2023-03-02 DIAGNOSIS — F41.9 ANXIETY: ICD-10-CM

## 2023-03-02 DIAGNOSIS — F01.A0 MILD VASCULAR DEMENTIA WITHOUT BEHAVIORAL DISTURBANCE, PSYCHOTIC DISTURBANCE, MOOD DISTURBANCE, OR ANXIETY: Primary | ICD-10-CM

## 2023-03-02 PROCEDURE — G8484 FLU IMMUNIZE NO ADMIN: HCPCS | Performed by: FAMILY MEDICINE

## 2023-03-02 PROCEDURE — G8420 CALC BMI NORM PARAMETERS: HCPCS | Performed by: FAMILY MEDICINE

## 2023-03-02 PROCEDURE — 99213 OFFICE O/P EST LOW 20 MIN: CPT | Performed by: FAMILY MEDICINE

## 2023-03-02 PROCEDURE — 3075F SYST BP GE 130 - 139MM HG: CPT | Performed by: FAMILY MEDICINE

## 2023-03-02 PROCEDURE — 3017F COLORECTAL CA SCREEN DOC REV: CPT | Performed by: FAMILY MEDICINE

## 2023-03-02 PROCEDURE — 1036F TOBACCO NON-USER: CPT | Performed by: FAMILY MEDICINE

## 2023-03-02 PROCEDURE — 1123F ACP DISCUSS/DSCN MKR DOCD: CPT | Performed by: FAMILY MEDICINE

## 2023-03-02 PROCEDURE — G8427 DOCREV CUR MEDS BY ELIG CLIN: HCPCS | Performed by: FAMILY MEDICINE

## 2023-03-02 PROCEDURE — 3078F DIAST BP <80 MM HG: CPT | Performed by: FAMILY MEDICINE

## 2023-03-02 ASSESSMENT — PATIENT HEALTH QUESTIONNAIRE - PHQ9
SUM OF ALL RESPONSES TO PHQ QUESTIONS 1-9: 0
6. FEELING BAD ABOUT YOURSELF - OR THAT YOU ARE A FAILURE OR HAVE LET YOURSELF OR YOUR FAMILY DOWN: 0
4. FEELING TIRED OR HAVING LITTLE ENERGY: 0
1. LITTLE INTEREST OR PLEASURE IN DOING THINGS: 0
7. TROUBLE CONCENTRATING ON THINGS, SUCH AS READING THE NEWSPAPER OR WATCHING TELEVISION: 0
SUM OF ALL RESPONSES TO PHQ QUESTIONS 1-9: 0
2. FEELING DOWN, DEPRESSED OR HOPELESS: 0
SUM OF ALL RESPONSES TO PHQ QUESTIONS 1-9: 0
8. MOVING OR SPEAKING SO SLOWLY THAT OTHER PEOPLE COULD HAVE NOTICED. OR THE OPPOSITE, BEING SO FIGETY OR RESTLESS THAT YOU HAVE BEEN MOVING AROUND A LOT MORE THAN USUAL: 0
5. POOR APPETITE OR OVEREATING: 0
10. IF YOU CHECKED OFF ANY PROBLEMS, HOW DIFFICULT HAVE THESE PROBLEMS MADE IT FOR YOU TO DO YOUR WORK, TAKE CARE OF THINGS AT HOME, OR GET ALONG WITH OTHER PEOPLE: 0
3. TROUBLE FALLING OR STAYING ASLEEP: 0
9. THOUGHTS THAT YOU WOULD BE BETTER OFF DEAD, OR OF HURTING YOURSELF: 0
SUM OF ALL RESPONSES TO PHQ9 QUESTIONS 1 & 2: 0
SUM OF ALL RESPONSES TO PHQ QUESTIONS 1-9: 0

## 2023-03-02 NOTE — PROGRESS NOTES
Subjective:   Thomas Pierre is a 70 y.o. male presents today for office visit at the recommendation of the speech therapist who found that he has increased anxiety noted during memory tasks which significantly impacts his accuracy during those tasks. They felt like if his anxiety was better controlled it would help with his memory and processing ability but also his ability to effectively communicate in conversational speech. He is here to discuss those recommendations  PHQ-9 Total Score: 0 (3/2/2023  1:55 PM)  Thoughts that you would be better off dead, or of hurting yourself in some way: 0 (3/2/2023  1:55 PM)  We had made referral to psychiatry back in the fall but is not apparent that he ever saw them. He does not recall ever seeing a therapist to address anxiety  Reviewed ST notes    Allergies   Allergen Reactions    Ace Inhibitors Other (See Comments)     PMH, MEDS reviewed     Objective:   Blood pressure 135/79, pulse 69, weight 196 lb (88.9 kg), SpO2 97 %. General- Pleasant and no distress  Psych- alert and oriented to person, place and time  Mood and affect are appropriate to situation  Musculoskeletal - Gait and station examination reveals mid-position with no abnormalities. Neurological- grossly intact. Most of today's visit spent preparing to see the patient including review of any prior or current tests, reviewing separate history, performing the above exam, counseling with review of symptoms, disposition, prognosis, and risk/benefit of treatment plan options in addition to limited behavioral counseling and recommendations, and we have talked about risk factor reduction where appropriate, and patient education, ordering any medications and tests/procedures; of course documenting the record as evident in this note, any appropriate care coordination which may or may not involve written patient education. Assessment/Plan:     1.  Mild vascular dementia without behavioral disturbance, psychotic disturbance, mood disturbance, or anxiety    2. Anxiety         1. Mild vascular dementia without behavioral disturbance, psychotic disturbance, mood disturbance, or anxiety  -     Amb External Referral To Counseling Services  2. Anxiety  -     Amb External Referral To Counseling Services    We talked about his condition being in part affected by his vascular dementia but also his speech is impeded by anxiety. He should continue with speech therapy. He is already on 150 mg of Zoloft and I hesitate to increase that to 200 mg not believing that that will help much with his anxiety. I do not think benzodiazepines are indicated in this situation. I will set him up at [facility] for behavioral therapy with attention to anxiety control and ongoing professional counseling to help that particular problem he should continue his speech therapy without a doubt  Followup:   Return if symptoms worsen or fail to improve.

## 2023-03-08 ENCOUNTER — OFFICE VISIT (OUTPATIENT)
Age: 68
End: 2023-03-08
Payer: MEDICARE

## 2023-03-08 DIAGNOSIS — R41.841 COGNITIVE COMMUNICATION DEFICIT: Primary | ICD-10-CM

## 2023-03-08 PROCEDURE — 92507 TX SP LANG VOICE COMM INDIV: CPT | Performed by: SPEECH-LANGUAGE PATHOLOGIST

## 2023-03-08 NOTE — PROGRESS NOTES
SPEECH/LANGUAGE/COGNITIVE    DAILY NOTE        NAME/AGE/GENDER:   Lexis Garnica III  [id number]  1955  ICD-10: Treatment Diagnosis: R41.841 Cognitive-Communication Deficit  Precautions/Allergies: Allergies   Allergen Reactions    Ace Inhibitors Other (See Comments)     TREATMENT PLAN:  Effective Dates: 2/16/2023 TO 4/2/2023. Frequency/Duration: once a week for 60 Day(s) MEDICAL/REFERRING DIAGNOSIS:  No diagnosis found. REFERRING PHYSICIAN:Jimena Flores DO MD Orders: evaluate and treat       TREATMENT DATE: 03/08/23     PROBLEM LIST (Impairments causing functional limitations):  Cognitive decline  INTERVENTIONS PLANNED:   [] Speech/Lang Therapy  94343 [] Dysphagia Therapy  98806   [x] TherIvntj Cog Funcj Contact (1st 15 min)  62608 [x] TherIvntj Cog Funcj Contact (addt'l 15 min)  [id number]          SUBJECTIVE: patient arrived to therapy alone and without complaints. Pain: No complaints of pain. Cognitive Linguistic:  Daily orientation:   Day of the week: +  Month +  Year +    2. Recalled recent events with minimal assist. Some difficulty with speech. 3. Attention task at:  1st trial: 0 errors  2nd trial: 1 error  3rd trial: 2 errors    4. Follow oral and written directions 2 step 4 components: at Min to Mod I 85% accuracy with 2 repetitions     5. Immediate recall (numbers)  4 numbers: Mod A with 50% accuracy due to decreased attention and memory recall and increased anxiety which I feel impacted his recall. Wrote down 4 digit numbers at Mod I 90% accuracy once he settled down and his anxiety decreased. 6. Participated in conversational starters with word finding difficulty. Circumlocutions are present. Some articulation errors and word distortions present but I could comprehend about 70% of what is was trying to communicate. .       Patient/Family/Caregiver Education:  Educated patient on the importance of daily orientation. IMPRESSIONS:   Overall patient did well. He does present with decreased attention and memory recall especially with more complex tasks. Increased anxiety noted during memory tasks which significantly impacts his accuracy during these tasks. I feel that his anxiety needs to be better controlled as this will impact his memory, processing and ability to effectively communicate much worse. Some difficulty with conversational speech due to rush speech and articulation errors in addition to increased anxiety. Assessment: [x] Progressing toward goals. [] No change. [] Other:  [x] Patient would continue to benefit from skilled physical therapy services to address speech and cognitive decline. Plan: [x] Continue current frequency toward long and short term goals. [] Specific Instructions for subsequent treatments:       Briseida Blanton. Ed CCC-SLP      Total Time 60 minutes

## 2023-03-10 ENCOUNTER — OFFICE VISIT (OUTPATIENT)
Age: 68
End: 2023-03-10

## 2023-03-10 DIAGNOSIS — R41.841 COGNITIVE COMMUNICATION DEFICIT: Primary | ICD-10-CM

## 2023-03-10 NOTE — PROGRESS NOTES
SPEECH/LANGUAGE/COGNITIVE    DAILY NOTE        NAME/AGE/GENDER:   Gerald Green III  [id number]  1955  ICD-10: Treatment Diagnosis: R41.841 Cognitive-Communication Deficit  Precautions/Allergies: Allergies   Allergen Reactions    Ace Inhibitors Other (See Comments)     TREATMENT PLAN:  Effective Dates: 2/16/2023 TO 4/2/2023. Frequency/Duration: once a week for 60 Day(s) MEDICAL/REFERRING DIAGNOSIS:    ICD-10-CM    1. Cognitive communication deficit  R41.841             REFERRING PHYSICIAN:Angel Flores DO MD Orders: evaluate and treat       TREATMENT DATE: 03/10/23     PROBLEM LIST (Impairments causing functional limitations):  Cognitive decline  INTERVENTIONS PLANNED:   [] Speech/Lang Therapy  88915 [] Dysphagia Therapy  80178   [x] TherIvntj Cog Funcj Contact (1st 15 min)  [id number] [x] TherIvntj Cog Funcj Contact (addt'l 15 min)  [id number]          SUBJECTIVE: patient arrived to therapy alone and without complaints. Pain: No complaints of pain. Cognitive Linguistic:  Daily orientation:   Day of the week: -  Month +  Year +  Date +    2. Recalled recent events with minimal assist. Some difficulty with speech. 3. Memory recall:   Writing telephone numbers at: Min A with 80% accuracy due to decreased attention/concentration and working memory  Recalled name/person of telephone number at: Mod A with 70% accuracy     4. Attention task at:   1st trial: 0 errors  2nd trial: 1 error  3rd trial: 0 errors    5. Time management: late or early Min A with 84% accuracy     6. Money management determining coin and bill at [address] A with 83% accuracy        6. Participated in conversational starters with word finding difficulty. Circumlocutions are present. Some articulation errors and word distortions present but I could comprehend about 70% of what is was trying to communicate. .       Patient/Family/Caregiver Education:  Educated patient on the importance of daily orientation.        IMPRESSIONS: Overall patient did well. He does present with decreased attention and memory recall especially with more complex tasks. Increased anxiety noted during memory tasks which significantly impacts his accuracy during these tasks. I feel that his anxiety needs to be better controlled as this will impact his memory, processing and ability to effectively communicate much worse. Some difficulty with conversational speech due to rush speech and articulation errors in addition to increased anxiety. Assessment: [x] Progressing toward goals. [] No change. [] Other:  [x] Patient would continue to benefit from skilled physical therapy services to address speech and cognitive decline. Plan: [x] Continue current frequency toward long and short term goals. [] Specific Instructions for subsequent treatments:       Yue Thomas. Ed CCC-SLP      Total Time 60 minutes

## 2023-03-15 ENCOUNTER — OFFICE VISIT (OUTPATIENT)
Age: 68
End: 2023-03-15
Payer: MEDICARE

## 2023-03-15 DIAGNOSIS — R41.841 COGNITIVE COMMUNICATION DEFICIT: Primary | ICD-10-CM

## 2023-03-15 PROCEDURE — 97130 THER IVNTJ EA ADDL 15 MIN: CPT | Performed by: SPEECH-LANGUAGE PATHOLOGIST

## 2023-03-15 PROCEDURE — 97129 THER IVNTJ 1ST 15 MIN: CPT | Performed by: SPEECH-LANGUAGE PATHOLOGIST

## 2023-03-15 NOTE — PROGRESS NOTES
SPEECH/LANGUAGE/COGNITIVE    DAILY NOTE        NAME/AGE/GENDER:   Nichelle Aleman III  [id number]  1955  ICD-10: Treatment Diagnosis: R41.841 Cognitive-Communication Deficit  Precautions/Allergies: Allergies   Allergen Reactions    Ace Inhibitors Other (See Comments)     TREATMENT PLAN:  Effective Dates: 2/16/2023 TO 4/2/2023. Frequency/Duration: once a week for 60 Day(s) MEDICAL/REFERRING DIAGNOSIS:    ICD-10-CM    1. Cognitive communication deficit  R41.841             REFERRING PHYSICIAN:Ana Luisa Flores DO MD Orders: evaluate and treat       TREATMENT DATE: 03/15/23     PROBLEM LIST (Impairments causing functional limitations):  Cognitive decline  INTERVENTIONS PLANNED:   [] Speech/Lang Therapy  95831 [] Dysphagia Therapy  57333   [x] TherIvntj Cog Funcj Contact (1st 15 min)  99404 [x] TherIvntj Cog Funcj Contact (addt'l 15 min)  35403          SUBJECTIVE: patient arrived to therapy alone and without complaints. Pain: No complaints of pain. Cognitive Linguistic:  Daily orientation:   Day of the week: +  Month +  Year +  Date +    2. Recalled recent events with minimal assist. Some difficulty with speech. 3. Memory recall:   Writing telephone numbers at: Mod A with 70% accuracy due to decreased attention/concentration and working memory  Recalled name/person of telephone number at: Mod A with 70% accuracy. Cued to slow down when recalling phone number as he would have the numbers correct but would not read them correctly. 4. Completed word finding task at [address] A at 85% accuracy    5. Time management: late or early Min A with 60% accuracy     6. Time management telling time: Independently at 100% accuracy   Able to write times independently at 100% accuracy     7. Responding to unusual situations: with some word finding difficulty but good problem solving and reasoning       6. Participated in conversational starters with word finding difficulty. Circumlocutions are present.  Some articulation errors and word distortions present but I could comprehend about 70% of what is was trying to communicate. .       Patient/Family/Caregiver Education:  Educated patient on the importance of daily orientation. IMPRESSIONS:   Overall patient did well. He does present with decreased attention and memory recall especially with more complex tasks. Increased anxiety noted during memory tasks which significantly impacts his accuracy during these tasks. I feel that his anxiety needs to be better controlled as this will impact his memory, processing and ability to effectively communicate much worse. Some difficulty with conversational speech due to rush speech and articulation errors in addition to increased anxiety. Assessment: [x] Progressing toward goals. [] No change. [] Other:  [x] Patient would continue to benefit from skilled physical therapy services to address speech and cognitive decline. Plan: [x] Continue current frequency toward long and short term goals. [] Specific Instructions for subsequent treatments:       Bruno Kaminski. Ed CCC-SLP      Total Time 60 minutes

## 2023-03-17 ENCOUNTER — OFFICE VISIT (OUTPATIENT)
Age: 68
End: 2023-03-17

## 2023-03-17 DIAGNOSIS — R41.841 COGNITIVE COMMUNICATION DEFICIT: Primary | ICD-10-CM

## 2023-03-17 NOTE — PROGRESS NOTES
SPEECH/LANGUAGE/COGNITIVE    DAILY NOTE        NAME/AGE/GENDER:   Winston Pace III  [id number]  1955  ICD-10: Treatment Diagnosis: R41.841 Cognitive-Communication Deficit  Precautions/Allergies: Allergies   Allergen Reactions    Ace Inhibitors Other (See Comments)     TREATMENT PLAN:  Effective Dates: 2/16/2023 TO 4/2/2023. Frequency/Duration: once a week for 60 Day(s) MEDICAL/REFERRING DIAGNOSIS:    ICD-10-CM    1. Cognitive communication deficit  R41.841             REFERRING PHYSICIAN:Kasi Flores DO MD Orders: evaluate and treat       TREATMENT DATE: 03/17/23     PROBLEM LIST (Impairments causing functional limitations):  Cognitive decline  INTERVENTIONS PLANNED:   [] Speech/Lang Therapy  03670 [] Dysphagia Therapy  19372   [x] TherIvntj Cog Funcj Contact (1st 15 min)  29236 [x] TherIvntj Cog Funcj Contact (addt'l 15 min)  29610          SUBJECTIVE: patient arrived to therapy alone and without complaints. Pain: No complaints of pain. Cognitive Linguistic:  Daily orientation:   Day of the week: -  Month +  Year +  Date +    2. Recalled recent events with minimal assist. Some difficulty with speech. 3. Memory recall:   Writing telephone numbers at: Mod A with 66% accuracy due to decreased attention/concentration and working memory  Recalled name/person of telephone number at: Mod A with 70% accuracy. Cued to slow down when recalling phone number as he would have the numbers correct but would not read them correctly. 4. Recall who in each direction at Mod I 95% accuracy     5. Recall location in each direction at Mod A with 73% accuracy     6. Explaining absurdities at Mod-Max A due to decreased comprehension and verbal expression. 7. Time management late or early at [address] [place] [address] with 80% accuracy      8. Participated in conversational starters with word finding difficulty. Circumlocutions are present.  Some articulation errors and word distortions present but I could comprehend about 70% of what is was trying to communicate. .       Patient/Family/Caregiver Education:  Educated patient on the importance of daily orientation. IMPRESSIONS:   Overall patient did well. He does present with decreased attention and memory recall especially with more complex tasks. Increased anxiety noted during memory tasks which significantly impacts his accuracy during these tasks. I feel that his anxiety needs to be better controlled as this will impact his memory, processing and ability to effectively communicate much worse. Some difficulty with conversational speech due to rush speech and articulation errors in addition to increased anxiety. Assessment: [x] Progressing toward goals. [] No change. [] Other:  [x] Patient would continue to benefit from skilled physical therapy services to address speech and cognitive decline. Plan: [x] Continue current frequency toward long and short term goals. [] Specific Instructions for subsequent treatments:       Linda Lainez. Ed CCC-SLP      Total Time 45 minutes

## 2023-03-28 ENCOUNTER — OFFICE VISIT (OUTPATIENT)
Dept: FAMILY MEDICINE CLINIC | Facility: CLINIC | Age: 68
End: 2023-03-28
Payer: MEDICARE

## 2023-03-28 VITALS
SYSTOLIC BLOOD PRESSURE: 124 MMHG | BODY MASS INDEX: 24.42 KG/M2 | OXYGEN SATURATION: 96 % | DIASTOLIC BLOOD PRESSURE: 80 MMHG | HEART RATE: 68 BPM | WEIGHT: 195.4 LBS

## 2023-03-28 DIAGNOSIS — E78.5 DYSLIPIDEMIA: ICD-10-CM

## 2023-03-28 DIAGNOSIS — F33.41 RECURRENT MAJOR DEPRESSIVE DISORDER, IN PARTIAL REMISSION (HCC): ICD-10-CM

## 2023-03-28 DIAGNOSIS — J34.89 NASAL DRAINAGE: ICD-10-CM

## 2023-03-28 DIAGNOSIS — I10 PRIMARY HYPERTENSION: ICD-10-CM

## 2023-03-28 DIAGNOSIS — Z82.49 FAMILY HISTORY OF CORONARY ARTERY BYPASS SURGERY: ICD-10-CM

## 2023-03-28 DIAGNOSIS — Z12.11 SPECIAL SCREENING FOR MALIGNANT NEOPLASMS, COLON: ICD-10-CM

## 2023-03-28 DIAGNOSIS — E11.9 CONTROLLED TYPE 2 DIABETES MELLITUS WITHOUT COMPLICATION, WITHOUT LONG-TERM CURRENT USE OF INSULIN (HCC): Primary | ICD-10-CM

## 2023-03-28 LAB
ALBUMIN, URINE, POC: 10 MG/L
CREATININE, URINE, POC: 200 MG/DL
MICROALB/CREAT RATIO, POC: <30 MG/G

## 2023-03-28 PROCEDURE — 3079F DIAST BP 80-89 MM HG: CPT | Performed by: FAMILY MEDICINE

## 2023-03-28 PROCEDURE — G8420 CALC BMI NORM PARAMETERS: HCPCS | Performed by: FAMILY MEDICINE

## 2023-03-28 PROCEDURE — 90677 PCV20 VACCINE IM: CPT | Performed by: FAMILY MEDICINE

## 2023-03-28 PROCEDURE — 82044 UR ALBUMIN SEMIQUANTITATIVE: CPT | Performed by: FAMILY MEDICINE

## 2023-03-28 PROCEDURE — 2022F DILAT RTA XM EVC RTNOPTHY: CPT | Performed by: FAMILY MEDICINE

## 2023-03-28 PROCEDURE — 1123F ACP DISCUSS/DSCN MKR DOCD: CPT | Performed by: FAMILY MEDICINE

## 2023-03-28 PROCEDURE — G8427 DOCREV CUR MEDS BY ELIG CLIN: HCPCS | Performed by: FAMILY MEDICINE

## 2023-03-28 PROCEDURE — 3044F HG A1C LEVEL LT 7.0%: CPT | Performed by: FAMILY MEDICINE

## 2023-03-28 PROCEDURE — 3017F COLORECTAL CA SCREEN DOC REV: CPT | Performed by: FAMILY MEDICINE

## 2023-03-28 PROCEDURE — G0009 ADMIN PNEUMOCOCCAL VACCINE: HCPCS | Performed by: FAMILY MEDICINE

## 2023-03-28 PROCEDURE — 3074F SYST BP LT 130 MM HG: CPT | Performed by: FAMILY MEDICINE

## 2023-03-28 PROCEDURE — G8484 FLU IMMUNIZE NO ADMIN: HCPCS | Performed by: FAMILY MEDICINE

## 2023-03-28 PROCEDURE — 99214 OFFICE O/P EST MOD 30 MIN: CPT | Performed by: FAMILY MEDICINE

## 2023-03-28 PROCEDURE — 1036F TOBACCO NON-USER: CPT | Performed by: FAMILY MEDICINE

## 2023-03-28 RX ORDER — MEMANTINE HYDROCHLORIDE 10 MG/1
10 TABLET ORAL 2 TIMES DAILY
Qty: 180 TABLET | Refills: 3 | Status: CANCELLED | OUTPATIENT
Start: 2023-03-28

## 2023-03-28 RX ORDER — SERTRALINE HYDROCHLORIDE 100 MG/1
150 TABLET, FILM COATED ORAL DAILY
Qty: 135 TABLET | Refills: 1 | Status: SHIPPED | OUTPATIENT
Start: 2023-03-28 | End: 2023-05-12

## 2023-03-28 RX ORDER — DONEPEZIL HYDROCHLORIDE 10 MG/1
TABLET, FILM COATED ORAL
Qty: 30 TABLET | Refills: 3 | Status: CANCELLED | OUTPATIENT
Start: 2023-03-28

## 2023-03-28 RX ORDER — OLMESARTAN MEDOXOMIL 20 MG/1
20 TABLET ORAL DAILY
Qty: 90 TABLET | Refills: 1 | Status: SHIPPED | OUTPATIENT
Start: 2023-03-28

## 2023-03-28 RX ORDER — SIMVASTATIN 10 MG
10 TABLET ORAL NIGHTLY
Qty: 90 TABLET | Refills: 1 | Status: SHIPPED | OUTPATIENT
Start: 2023-03-28

## 2023-03-28 RX ORDER — IPRATROPIUM BROMIDE 21 UG/1
2 SPRAY, METERED NASAL EVERY 12 HOURS
Qty: 30 ML | Refills: 5 | Status: SHIPPED | OUTPATIENT
Start: 2023-03-28

## 2023-03-28 ASSESSMENT — PATIENT HEALTH QUESTIONNAIRE - PHQ9
4. FEELING TIRED OR HAVING LITTLE ENERGY: 0
9. THOUGHTS THAT YOU WOULD BE BETTER OFF DEAD, OR OF HURTING YOURSELF: 0
SUM OF ALL RESPONSES TO PHQ QUESTIONS 1-9: 5
7. TROUBLE CONCENTRATING ON THINGS, SUCH AS READING THE NEWSPAPER OR WATCHING TELEVISION: 0
8. MOVING OR SPEAKING SO SLOWLY THAT OTHER PEOPLE COULD HAVE NOTICED. OR THE OPPOSITE, BEING SO FIGETY OR RESTLESS THAT YOU HAVE BEEN MOVING AROUND A LOT MORE THAN USUAL: 0
SUM OF ALL RESPONSES TO PHQ QUESTIONS 1-9: 5
3. TROUBLE FALLING OR STAYING ASLEEP: 0
SUM OF ALL RESPONSES TO PHQ QUESTIONS 1-9: 5
1. LITTLE INTEREST OR PLEASURE IN DOING THINGS: 3
10. IF YOU CHECKED OFF ANY PROBLEMS, HOW DIFFICULT HAVE THESE PROBLEMS MADE IT FOR YOU TO DO YOUR WORK, TAKE CARE OF THINGS AT HOME, OR GET ALONG WITH OTHER PEOPLE: 0
SUM OF ALL RESPONSES TO PHQ9 QUESTIONS 1 & 2: 5
6. FEELING BAD ABOUT YOURSELF - OR THAT YOU ARE A FAILURE OR HAVE LET YOURSELF OR YOUR FAMILY DOWN: 0
5. POOR APPETITE OR OVEREATING: 0
SUM OF ALL RESPONSES TO PHQ QUESTIONS 1-9: 5
2. FEELING DOWN, DEPRESSED OR HOPELESS: 2

## 2023-03-28 NOTE — PROGRESS NOTES
Take 1 tablet by mouth nightly TAKE ONE TABLET BY MOUTH AT BEDTIME 90 tablet 1    ipratropium (ATROVENT) 0.03 % nasal spray 2 sprays by Each Nostril route in the morning and 2 sprays in the evening. 30 mL 5    triamcinolone (KENALOG) 0.1 % cream Apply topically 2 times daily. 28.4 g 3    memantine (NAMENDA) 10 MG tablet Take 1 tablet by mouth 2 times daily 180 tablet 3    donepezil (ARICEPT) 10 MG tablet Take half at hs for one month than continue with one at hs 30 tablet 3    Aspirin 81 MG CAPS Take 81 mg by mouth daily       No current facility-administered medications for this visit. Lab Results   Component Value Date/Time     01/31/2023 11:04 AM    K 4.0 01/31/2023 11:04 AM     01/31/2023 11:04 AM    CO2 29 01/31/2023 11:04 AM    BUN 15 01/31/2023 11:04 AM    CREATININE 1.30 01/31/2023 11:04 AM    GLUCOSE 144 01/31/2023 11:04 AM    CALCIUM 10.1 01/31/2023 11:04 AM          Objective:  Blood pressure 124/80, pulse 68, weight 195 lb 6.4 oz (88.6 kg), SpO2 96 %. Body mass index is 24.42 kg/m². BP Readings from Last 3 Encounters:   03/28/23 124/80   03/02/23 135/79   02/21/23 112/71     General- Pleasant and no distress  Psych- alert and oriented to person, place and time  Mood and affect are appropriate to situation  Musculoskeletal - Gait and station examination reveals mid-position with no abnormalities. Neurological- grossly intact. rrr s mrg.   bcta  extremeties are without edema, and dp, and pt pulses are intact  No carotid bruits   Fundoscopic exam is: benign without retinopathology     Assessment:  1. Controlled type 2 diabetes mellitus without complication, without long-term current use of insulin ([address]    2. Primary hypertension    3. Recurrent major depressive disorder, in partial remission ([address]    4. Family history of coronary artery bypass surgery    5. Dyslipidemia    6. Special screening for malignant neoplasms, colon    7.  Nasal drainage        Plan:   Prescription drug

## 2023-03-29 ENCOUNTER — OFFICE VISIT (OUTPATIENT)
Age: 68
End: 2023-03-29
Payer: MEDICARE

## 2023-03-29 DIAGNOSIS — R41.841 COGNITIVE COMMUNICATION DEFICIT: Primary | ICD-10-CM

## 2023-03-29 LAB
ANION GAP SERPL CALC-SCNC: 3 MMOL/L (ref 2–11)
BUN SERPL-MCNC: 15 MG/DL (ref 8–23)
CALCIUM SERPL-MCNC: 10 MG/DL (ref 8.3–10.4)
CHLORIDE SERPL-SCNC: 106 MMOL/L (ref 101–110)
CO2 SERPL-SCNC: 29 MMOL/L (ref 21–32)
CREAT SERPL-MCNC: 1.3 MG/DL (ref 0.8–1.5)
GLUCOSE SERPL-MCNC: 111 MG/DL (ref 65–100)
POTASSIUM SERPL-SCNC: 3.6 MMOL/L (ref 3.5–5.1)
SODIUM SERPL-SCNC: 138 MMOL/L (ref 133–143)

## 2023-03-29 PROCEDURE — 92507 TX SP LANG VOICE COMM INDIV: CPT | Performed by: SPEECH-LANGUAGE PATHOLOGIST

## 2023-03-29 NOTE — PROGRESS NOTES
errors and word distortions present but I could comprehend about 70% of what is was trying to communicate. .       Patient/Family/Caregiver Education:  Educated patient on the importance of daily orientation. IMPRESSIONS:   Overall patient did well. He does present with decreased attention and memory recall especially with more complex tasks. Increased anxiety noted during memory tasks which significantly impacts his accuracy during these tasks. I feel that his anxiety needs to be better controlled as this will impact his memory, processing and ability to effectively communicate much worse. Some difficulty with conversational speech due to rush speech and articulation errors in addition to increased anxiety. Assessment: [x] Progressing toward goals. [] No change. [] Other:  [x] Patient would continue to benefit from skilled physical therapy services to address speech and cognitive decline. Plan: [x] Continue current frequency toward long and short term goals. [] Specific Instructions for subsequent treatments:       Smooth López. Ed CCC-SLP      Total Time 45 minutes

## 2023-03-31 ENCOUNTER — OFFICE VISIT (OUTPATIENT)
Age: 68
End: 2023-03-31

## 2023-03-31 DIAGNOSIS — R41.3 MEMORY LOSS: Primary | ICD-10-CM

## 2023-03-31 DIAGNOSIS — R41.841 COGNITIVE COMMUNICATION DEFICIT: Primary | ICD-10-CM

## 2023-03-31 RX ORDER — DONEPEZIL HYDROCHLORIDE 10 MG/1
TABLET, FILM COATED ORAL
Qty: 30 TABLET | Refills: 3 | OUTPATIENT
Start: 2023-03-31

## 2023-03-31 NOTE — PROGRESS NOTES
Patient/Family/Caregiver Education:  Educated patient on the importance of daily orientation. IMPRESSIONS:   Overall patient did well. He does present with decreased attention and memory recall especially with more complex tasks. Increased anxiety noted during memory tasks which significantly impacts his accuracy during these tasks. I feel that his anxiety needs to be better controlled as this will impact his memory, processing and ability to effectively communicate much worse. Some difficulty with conversational speech due to rush speech and articulation errors in addition to increased anxiety. Assessment: [x] Progressing toward goals. [] No change. [] Other:  [x] Patient would continue to benefit from skilled physical therapy services to address speech and cognitive decline. Plan: [x] Continue current frequency toward long and short term goals. [] Specific Instructions for subsequent treatments:       Daria Cage. Ed CCC-SLP      Total Time 55 minutes

## 2023-04-01 RX ORDER — DONEPEZIL HYDROCHLORIDE 10 MG/1
TABLET, FILM COATED ORAL
Qty: 30 TABLET | Refills: 3 | Status: SHIPPED | OUTPATIENT
Start: 2023-04-01

## 2023-04-03 DIAGNOSIS — R41.3 MEMORY LOSS: ICD-10-CM

## 2023-04-03 RX ORDER — DONEPEZIL HYDROCHLORIDE 10 MG/1
TABLET, FILM COATED ORAL
Qty: 90 TABLET | Refills: 3 | Status: SHIPPED | OUTPATIENT
Start: 2023-04-03

## 2023-04-20 PROCEDURE — 93294 REM INTERROG EVL PM/LDLS PM: CPT | Performed by: INTERNAL MEDICINE

## 2023-04-20 PROCEDURE — 93296 REM INTERROG EVL PM/IDS: CPT | Performed by: INTERNAL MEDICINE

## 2023-05-24 DIAGNOSIS — Z95.0 PACEMAKER: ICD-10-CM

## 2023-07-03 ENCOUNTER — OFFICE VISIT (OUTPATIENT)
Dept: FAMILY MEDICINE CLINIC | Facility: CLINIC | Age: 68
End: 2023-07-03
Payer: MEDICARE

## 2023-07-03 ENCOUNTER — TELEPHONE (OUTPATIENT)
Dept: FAMILY MEDICINE CLINIC | Facility: CLINIC | Age: 68
End: 2023-07-03

## 2023-07-03 VITALS
HEART RATE: 81 BPM | BODY MASS INDEX: 24.25 KG/M2 | HEIGHT: 75 IN | WEIGHT: 195 LBS | DIASTOLIC BLOOD PRESSURE: 80 MMHG | SYSTOLIC BLOOD PRESSURE: 120 MMHG | OXYGEN SATURATION: 95 %

## 2023-07-03 DIAGNOSIS — L23.7 POISON IVY DERMATITIS: Primary | ICD-10-CM

## 2023-07-03 DIAGNOSIS — L29.9 PRURITUS: ICD-10-CM

## 2023-07-03 PROCEDURE — 3074F SYST BP LT 130 MM HG: CPT | Performed by: FAMILY MEDICINE

## 2023-07-03 PROCEDURE — 1036F TOBACCO NON-USER: CPT | Performed by: FAMILY MEDICINE

## 2023-07-03 PROCEDURE — G8427 DOCREV CUR MEDS BY ELIG CLIN: HCPCS | Performed by: FAMILY MEDICINE

## 2023-07-03 PROCEDURE — 99213 OFFICE O/P EST LOW 20 MIN: CPT | Performed by: FAMILY MEDICINE

## 2023-07-03 PROCEDURE — 3079F DIAST BP 80-89 MM HG: CPT | Performed by: FAMILY MEDICINE

## 2023-07-03 PROCEDURE — G8420 CALC BMI NORM PARAMETERS: HCPCS | Performed by: FAMILY MEDICINE

## 2023-07-03 PROCEDURE — 3017F COLORECTAL CA SCREEN DOC REV: CPT | Performed by: FAMILY MEDICINE

## 2023-07-03 PROCEDURE — 1123F ACP DISCUSS/DSCN MKR DOCD: CPT | Performed by: FAMILY MEDICINE

## 2023-07-03 RX ORDER — CETIRIZINE HYDROCHLORIDE 10 MG/1
TABLET ORAL
COMMUNITY
Start: 2023-06-30

## 2023-07-03 RX ORDER — TACROLIMUS 1 MG/G
OINTMENT TOPICAL
COMMUNITY
Start: 2023-05-30

## 2023-07-03 RX ORDER — PREDNISONE 10 MG/1
TABLET ORAL
Qty: 10 TABLET | Refills: 0 | Status: SHIPPED | OUTPATIENT
Start: 2023-07-03

## 2023-07-03 NOTE — PROGRESS NOTES
Subjective:   Jennifer Verma is a 70 y.o. male presents today with complaints of a rash and itching. Allergies   Allergen Reactions    Ace Inhibitors Other (See Comments)     PMH, MEDS reviewed    Objective:  Blood pressure 120/80, pulse 81, height 6' 3\" (1.905 m), weight 195 lb (88.5 kg), SpO2 95 %. General- pleasant, no distress  Psych- alert and oriented to person, place and time  Mood and affect are appropriate to the visit   rrr s mrg, bcta. Skin- reveals erythematous papules in a linear pattern is some areas, some vesicular, a few areas weeping. Location is primarily involving neck area, both upper extremities visualized. He also reports that scrotum is involved    Assessment:  1. Poison ivy dermatitis    2. Pruritus      Contact dermatitis is Likely Poison Ivy or Oak  pruritis  Plan:   Prednisone 40mg a day for 10 days, then 30mg for 4 days, even if you feel better, finish the entire prescription. Reassurance given as well as verbal patient education    1. Poison ivy dermatitis  -     predniSONE (DELTASONE) 10 MG tablet; Take 4 a day for 10 days, then 3 a day for 4 days, Disp-10 tablet, R-0Normal  2. Pruritus  -     predniSONE (DELTASONE) 10 MG tablet; Take 4 a day for 10 days, then 3 a day for 4 days, Disp-10 tablet, R-0Normal    Followup:  No follow-up provider specified.

## 2023-07-11 SDOH — HEALTH STABILITY: PHYSICAL HEALTH: ON AVERAGE, HOW MANY MINUTES DO YOU ENGAGE IN EXERCISE AT THIS LEVEL?: 90 MIN

## 2023-07-11 SDOH — HEALTH STABILITY: PHYSICAL HEALTH: ON AVERAGE, HOW MANY DAYS PER WEEK DO YOU ENGAGE IN MODERATE TO STRENUOUS EXERCISE (LIKE A BRISK WALK)?: 5 DAYS

## 2023-07-11 ASSESSMENT — PATIENT HEALTH QUESTIONNAIRE - PHQ9
SUM OF ALL RESPONSES TO PHQ QUESTIONS 1-9: 0
SUM OF ALL RESPONSES TO PHQ QUESTIONS 1-9: 0
2. FEELING DOWN, DEPRESSED OR HOPELESS: 0
SUM OF ALL RESPONSES TO PHQ9 QUESTIONS 1 & 2: 0
SUM OF ALL RESPONSES TO PHQ QUESTIONS 1-9: 0
1. LITTLE INTEREST OR PLEASURE IN DOING THINGS: 0
SUM OF ALL RESPONSES TO PHQ QUESTIONS 1-9: 0

## 2023-07-11 ASSESSMENT — LIFESTYLE VARIABLES
HOW OFTEN DO YOU HAVE SIX OR MORE DRINKS ON ONE OCCASION: 1
HOW MANY STANDARD DRINKS CONTAINING ALCOHOL DO YOU HAVE ON A TYPICAL DAY: PATIENT DOES NOT DRINK
HOW MANY STANDARD DRINKS CONTAINING ALCOHOL DO YOU HAVE ON A TYPICAL DAY: 0
HOW OFTEN DO YOU HAVE A DRINK CONTAINING ALCOHOL: 1
HOW OFTEN DO YOU HAVE A DRINK CONTAINING ALCOHOL: NEVER

## 2023-07-18 ENCOUNTER — OFFICE VISIT (OUTPATIENT)
Dept: FAMILY MEDICINE CLINIC | Facility: CLINIC | Age: 68
End: 2023-07-18
Payer: MEDICARE

## 2023-07-18 VITALS
DIASTOLIC BLOOD PRESSURE: 80 MMHG | OXYGEN SATURATION: 94 % | WEIGHT: 195.8 LBS | HEIGHT: 75 IN | BODY MASS INDEX: 24.34 KG/M2 | SYSTOLIC BLOOD PRESSURE: 134 MMHG | HEART RATE: 71 BPM

## 2023-07-18 DIAGNOSIS — D12.6 TUBULAR ADENOMA OF COLON: ICD-10-CM

## 2023-07-18 DIAGNOSIS — Z00.00 MEDICARE ANNUAL WELLNESS VISIT, SUBSEQUENT: Chronic | ICD-10-CM

## 2023-07-18 PROCEDURE — 3075F SYST BP GE 130 - 139MM HG: CPT | Performed by: FAMILY MEDICINE

## 2023-07-18 PROCEDURE — G0439 PPPS, SUBSEQ VISIT: HCPCS | Performed by: FAMILY MEDICINE

## 2023-07-18 PROCEDURE — 3079F DIAST BP 80-89 MM HG: CPT | Performed by: FAMILY MEDICINE

## 2023-07-18 PROCEDURE — 3017F COLORECTAL CA SCREEN DOC REV: CPT | Performed by: FAMILY MEDICINE

## 2023-07-18 PROCEDURE — 1123F ACP DISCUSS/DSCN MKR DOCD: CPT | Performed by: FAMILY MEDICINE

## 2023-07-18 ASSESSMENT — PATIENT HEALTH QUESTIONNAIRE - PHQ9
1. LITTLE INTEREST OR PLEASURE IN DOING THINGS: 0
SUM OF ALL RESPONSES TO PHQ QUESTIONS 1-9: 0
3. TROUBLE FALLING OR STAYING ASLEEP: 0
SUM OF ALL RESPONSES TO PHQ QUESTIONS 1-9: 0
2. FEELING DOWN, DEPRESSED OR HOPELESS: 0
SUM OF ALL RESPONSES TO PHQ QUESTIONS 1-9: 0
10. IF YOU CHECKED OFF ANY PROBLEMS, HOW DIFFICULT HAVE THESE PROBLEMS MADE IT FOR YOU TO DO YOUR WORK, TAKE CARE OF THINGS AT HOME, OR GET ALONG WITH OTHER PEOPLE: 0
8. MOVING OR SPEAKING SO SLOWLY THAT OTHER PEOPLE COULD HAVE NOTICED. OR THE OPPOSITE, BEING SO FIGETY OR RESTLESS THAT YOU HAVE BEEN MOVING AROUND A LOT MORE THAN USUAL: 0
4. FEELING TIRED OR HAVING LITTLE ENERGY: 0
6. FEELING BAD ABOUT YOURSELF - OR THAT YOU ARE A FAILURE OR HAVE LET YOURSELF OR YOUR FAMILY DOWN: 0
5. POOR APPETITE OR OVEREATING: 0
SUM OF ALL RESPONSES TO PHQ QUESTIONS 1-9: 0
9. THOUGHTS THAT YOU WOULD BE BETTER OFF DEAD, OR OF HURTING YOURSELF: 0
7. TROUBLE CONCENTRATING ON THINGS, SUCH AS READING THE NEWSPAPER OR WATCHING TELEVISION: 0
SUM OF ALL RESPONSES TO PHQ9 QUESTIONS 1 & 2: 0

## 2023-07-18 NOTE — PATIENT INSTRUCTIONS
Hospitalist illnesses that may run in your family, and various assessments and screenings as appropriate. After reviewing your medical record and screening and assessments performed today your provider may have ordered immunizations, labs, imaging, and/or referrals for you. A list of these orders (if applicable) as well as your Preventive Care list are included within your After Visit Summary for your review. Other Preventive Recommendations:    A preventive eye exam performed by an eye specialist is recommended every 1-2 years to screen for glaucoma; cataracts, macular degeneration, and other eye disorders. A preventive dental visit is recommended every 6 months. Try to get at least 150 minutes of exercise per week or 10,000 steps per day on a pedometer . Order or download the FREE \"Exercise & Physical Activity: Your Everyday Guide\" from [employer]. Call 2-291.391.9125 or search [employer] online. You need 1512-1592 mg of calcium and 4790-9941 IU of vitamin D per day. It is possible to meet your calcium requirement with diet alone, but a vitamin D supplement is usually necessary to meet this goal.  When exposed to the sun, use a sunscreen that protects against both UVA and UVB radiation with an SPF of 30 or greater. Reapply every 2 to 3 hours or after sweating, drying off with a towel, or swimming. Always wear a seat belt when traveling in a car. Always wear a helmet when riding a bicycle or motorcycle.

## 2023-08-16 PROCEDURE — 93296 REM INTERROG EVL PM/IDS: CPT | Performed by: INTERNAL MEDICINE

## 2023-08-16 PROCEDURE — 93294 REM INTERROG EVL PM/LDLS PM: CPT | Performed by: INTERNAL MEDICINE

## 2023-08-29 ENCOUNTER — OFFICE VISIT (OUTPATIENT)
Dept: FAMILY MEDICINE CLINIC | Facility: CLINIC | Age: 68
End: 2023-08-29
Payer: MEDICARE

## 2023-08-29 VITALS
SYSTOLIC BLOOD PRESSURE: 138 MMHG | HEART RATE: 85 BPM | HEIGHT: 75 IN | TEMPERATURE: 97.1 F | OXYGEN SATURATION: 93 % | WEIGHT: 196.2 LBS | BODY MASS INDEX: 24.39 KG/M2 | RESPIRATION RATE: 16 BRPM | DIASTOLIC BLOOD PRESSURE: 82 MMHG

## 2023-08-29 DIAGNOSIS — F01.A0 MILD VASCULAR DEMENTIA WITHOUT BEHAVIORAL DISTURBANCE, PSYCHOTIC DISTURBANCE, MOOD DISTURBANCE, OR ANXIETY (HCC): Primary | ICD-10-CM

## 2023-08-29 PROCEDURE — G8427 DOCREV CUR MEDS BY ELIG CLIN: HCPCS | Performed by: FAMILY MEDICINE

## 2023-08-29 PROCEDURE — 3079F DIAST BP 80-89 MM HG: CPT | Performed by: FAMILY MEDICINE

## 2023-08-29 PROCEDURE — 1036F TOBACCO NON-USER: CPT | Performed by: FAMILY MEDICINE

## 2023-08-29 PROCEDURE — 3075F SYST BP GE 130 - 139MM HG: CPT | Performed by: FAMILY MEDICINE

## 2023-08-29 PROCEDURE — 1123F ACP DISCUSS/DSCN MKR DOCD: CPT | Performed by: FAMILY MEDICINE

## 2023-08-29 PROCEDURE — G8420 CALC BMI NORM PARAMETERS: HCPCS | Performed by: FAMILY MEDICINE

## 2023-08-29 PROCEDURE — 99213 OFFICE O/P EST LOW 20 MIN: CPT | Performed by: FAMILY MEDICINE

## 2023-08-29 PROCEDURE — 3017F COLORECTAL CA SCREEN DOC REV: CPT | Performed by: FAMILY MEDICINE

## 2023-08-29 ASSESSMENT — PATIENT HEALTH QUESTIONNAIRE - PHQ9
10. IF YOU CHECKED OFF ANY PROBLEMS, HOW DIFFICULT HAVE THESE PROBLEMS MADE IT FOR YOU TO DO YOUR WORK, TAKE CARE OF THINGS AT HOME, OR GET ALONG WITH OTHER PEOPLE: 1
6. FEELING BAD ABOUT YOURSELF - OR THAT YOU ARE A FAILURE OR HAVE LET YOURSELF OR YOUR FAMILY DOWN: 0
SUM OF ALL RESPONSES TO PHQ QUESTIONS 1-9: 9
1. LITTLE INTEREST OR PLEASURE IN DOING THINGS: 2
SUM OF ALL RESPONSES TO PHQ QUESTIONS 1-9: 9
2. FEELING DOWN, DEPRESSED OR HOPELESS: 1
9. THOUGHTS THAT YOU WOULD BE BETTER OFF DEAD, OR OF HURTING YOURSELF: 0
4. FEELING TIRED OR HAVING LITTLE ENERGY: 2
8. MOVING OR SPEAKING SO SLOWLY THAT OTHER PEOPLE COULD HAVE NOTICED. OR THE OPPOSITE, BEING SO FIGETY OR RESTLESS THAT YOU HAVE BEEN MOVING AROUND A LOT MORE THAN USUAL: 1
3. TROUBLE FALLING OR STAYING ASLEEP: 2
SUM OF ALL RESPONSES TO PHQ9 QUESTIONS 1 & 2: 3
5. POOR APPETITE OR OVEREATING: 0
SUM OF ALL RESPONSES TO PHQ QUESTIONS 1-9: 9
SUM OF ALL RESPONSES TO PHQ QUESTIONS 1-9: 9
7. TROUBLE CONCENTRATING ON THINGS, SUCH AS READING THE NEWSPAPER OR WATCHING TELEVISION: 1

## 2023-09-03 ENCOUNTER — APPOINTMENT (OUTPATIENT)
Dept: GENERAL RADIOLOGY | Age: 68
DRG: 884 | End: 2023-09-03
Payer: MEDICARE

## 2023-09-03 ENCOUNTER — HOSPITAL ENCOUNTER (INPATIENT)
Age: 68
LOS: 16 days | Discharge: NURSING, REHAB OR LONG-TERM CARE | DRG: 884 | End: 2023-09-20
Attending: EMERGENCY MEDICINE | Admitting: FAMILY MEDICINE
Payer: MEDICARE

## 2023-09-03 DIAGNOSIS — N40.1 BENIGN PROSTATIC HYPERPLASIA WITH URINARY RETENTION: ICD-10-CM

## 2023-09-03 DIAGNOSIS — F03.C2 SEVERE DEMENTIA WITH PSYCHOTIC DISTURBANCE, UNSPECIFIED DEMENTIA TYPE (HCC): ICD-10-CM

## 2023-09-03 DIAGNOSIS — R33.8 BENIGN PROSTATIC HYPERPLASIA WITH URINARY RETENTION: ICD-10-CM

## 2023-09-03 DIAGNOSIS — R41.0 DELIRIUM: Primary | ICD-10-CM

## 2023-09-03 PROBLEM — F01.B2 MODERATE VASCULAR DEMENTIA WITH PSYCHOTIC DISTURBANCE (HCC): Chronic | Status: ACTIVE | Noted: 2022-04-05

## 2023-09-03 PROBLEM — Z95.0 PACEMAKER: Status: RESOLVED | Noted: 2023-09-03 | Resolved: 2023-09-03

## 2023-09-03 PROBLEM — Z95.0 PACEMAKER: Status: ACTIVE | Noted: 2023-09-03

## 2023-09-03 PROBLEM — Z00.00 MEDICARE ANNUAL WELLNESS VISIT, SUBSEQUENT: Chronic | Status: RESOLVED | Noted: 2019-04-16 | Resolved: 2023-09-03

## 2023-09-03 PROBLEM — Z71.89 ADVANCED CARE PLANNING/COUNSELING DISCUSSION: Chronic | Status: RESOLVED | Noted: 2019-04-16 | Resolved: 2023-09-03

## 2023-09-03 PROBLEM — Z60.9 HOSPITAL ADMISSION DUE TO SOCIAL SITUATION: Status: ACTIVE | Noted: 2023-09-03

## 2023-09-03 LAB
ALBUMIN SERPL-MCNC: 4 G/DL (ref 3.2–4.6)
ALBUMIN/GLOB SERPL: 1.2 (ref 0.4–1.6)
ALP SERPL-CCNC: 66 U/L (ref 50–136)
ALT SERPL-CCNC: 25 U/L (ref 12–65)
ANION GAP SERPL CALC-SCNC: 5 MMOL/L (ref 2–11)
AST SERPL-CCNC: 23 U/L (ref 15–37)
BACTERIA URNS QL MICRO: 0 /HPF
BASOPHILS # BLD: 0.1 K/UL (ref 0–0.2)
BASOPHILS NFR BLD: 1 % (ref 0–2)
BILIRUB SERPL-MCNC: 1.4 MG/DL (ref 0.2–1.1)
BUN SERPL-MCNC: 25 MG/DL (ref 8–23)
CALCIUM SERPL-MCNC: 11.1 MG/DL (ref 8.3–10.4)
CASTS URNS QL MICRO: 0 /LPF
CHLORIDE SERPL-SCNC: 105 MMOL/L (ref 101–110)
CO2 SERPL-SCNC: 30 MMOL/L (ref 21–32)
CREAT SERPL-MCNC: 1.36 MG/DL (ref 0.8–1.5)
CRYSTALS URNS QL MICRO: NORMAL /LPF
DIFFERENTIAL METHOD BLD: NORMAL
EOSINOPHIL # BLD: 0.2 K/UL (ref 0–0.8)
EOSINOPHIL NFR BLD: 2 % (ref 0.5–7.8)
EPI CELLS #/AREA URNS HPF: NORMAL /HPF
ERYTHROCYTE [DISTWIDTH] IN BLOOD BY AUTOMATED COUNT: 13.3 % (ref 11.9–14.6)
GLOBULIN SER CALC-MCNC: 3.4 G/DL (ref 2.8–4.5)
GLUCOSE BLD STRIP.AUTO-MCNC: 118 MG/DL (ref 65–100)
GLUCOSE SERPL-MCNC: 131 MG/DL (ref 65–100)
HCT VFR BLD AUTO: 42.1 % (ref 41.1–50.3)
HGB BLD-MCNC: 14 G/DL (ref 13.6–17.2)
IMM GRANULOCYTES # BLD AUTO: 0 K/UL (ref 0–0.5)
IMM GRANULOCYTES NFR BLD AUTO: 0 % (ref 0–5)
LACTATE SERPL-SCNC: 1 MMOL/L (ref 0.4–2)
LYMPHOCYTES # BLD: 1.2 K/UL (ref 0.5–4.6)
LYMPHOCYTES NFR BLD: 15 % (ref 13–44)
MCH RBC QN AUTO: 30.3 PG (ref 26.1–32.9)
MCHC RBC AUTO-ENTMCNC: 33.3 G/DL (ref 31.4–35)
MCV RBC AUTO: 91.1 FL (ref 82–102)
MONOCYTES # BLD: 0.6 K/UL (ref 0.1–1.3)
MONOCYTES NFR BLD: 8 % (ref 4–12)
MUCOUS THREADS URNS QL MICRO: 0 /LPF
NEUTS SEG # BLD: 5.8 K/UL (ref 1.7–8.2)
NEUTS SEG NFR BLD: 74 % (ref 43–78)
NRBC # BLD: 0 K/UL (ref 0–0.2)
OTHER OBSERVATIONS: NORMAL
PLATELET # BLD AUTO: 162 K/UL (ref 150–450)
PMV BLD AUTO: 9.5 FL (ref 9.4–12.3)
POTASSIUM SERPL-SCNC: 3.8 MMOL/L (ref 3.5–5.1)
PROT SERPL-MCNC: 7.4 G/DL (ref 6.3–8.2)
RBC # BLD AUTO: 4.62 M/UL (ref 4.23–5.6)
RBC #/AREA URNS HPF: NORMAL /HPF
SERVICE CMNT-IMP: ABNORMAL
SODIUM SERPL-SCNC: 140 MMOL/L (ref 133–143)
TSH W FREE THYROID IF ABNORMAL: 1.85 UIU/ML (ref 0.36–3.74)
WBC # BLD AUTO: 7.8 K/UL (ref 4.3–11.1)
WBC URNS QL MICRO: NORMAL /HPF

## 2023-09-03 PROCEDURE — 99285 EMERGENCY DEPT VISIT HI MDM: CPT

## 2023-09-03 PROCEDURE — G0378 HOSPITAL OBSERVATION PER HR: HCPCS

## 2023-09-03 PROCEDURE — 84443 ASSAY THYROID STIM HORMONE: CPT

## 2023-09-03 PROCEDURE — 94761 N-INVAS EAR/PLS OXIMETRY MLT: CPT

## 2023-09-03 PROCEDURE — 85025 COMPLETE CBC W/AUTO DIFF WBC: CPT

## 2023-09-03 PROCEDURE — 83605 ASSAY OF LACTIC ACID: CPT

## 2023-09-03 PROCEDURE — 82962 GLUCOSE BLOOD TEST: CPT

## 2023-09-03 PROCEDURE — 71045 X-RAY EXAM CHEST 1 VIEW: CPT

## 2023-09-03 PROCEDURE — 81015 MICROSCOPIC EXAM OF URINE: CPT

## 2023-09-03 PROCEDURE — 80053 COMPREHEN METABOLIC PANEL: CPT

## 2023-09-03 PROCEDURE — 93005 ELECTROCARDIOGRAM TRACING: CPT | Performed by: EMERGENCY MEDICINE

## 2023-09-03 PROCEDURE — 6370000000 HC RX 637 (ALT 250 FOR IP): Performed by: FAMILY MEDICINE

## 2023-09-03 RX ORDER — INSULIN LISPRO 100 [IU]/ML
0-10 INJECTION, SOLUTION INTRAVENOUS; SUBCUTANEOUS
Status: DISCONTINUED | OUTPATIENT
Start: 2023-09-04 | End: 2023-09-05 | Stop reason: ALTCHOICE

## 2023-09-03 RX ORDER — DEXTROSE MONOHYDRATE 100 MG/ML
INJECTION, SOLUTION INTRAVENOUS CONTINUOUS PRN
Status: DISCONTINUED | OUTPATIENT
Start: 2023-09-03 | End: 2023-09-20 | Stop reason: HOSPADM

## 2023-09-03 RX ORDER — POTASSIUM CHLORIDE 20 MEQ/1
40 TABLET, EXTENDED RELEASE ORAL PRN
Status: DISCONTINUED | OUTPATIENT
Start: 2023-09-03 | End: 2023-09-20 | Stop reason: HOSPADM

## 2023-09-03 RX ORDER — POTASSIUM CHLORIDE 7.45 MG/ML
10 INJECTION INTRAVENOUS PRN
Status: DISCONTINUED | OUTPATIENT
Start: 2023-09-03 | End: 2023-09-20 | Stop reason: HOSPADM

## 2023-09-03 RX ORDER — DONEPEZIL HYDROCHLORIDE 5 MG/1
10 TABLET, FILM COATED ORAL NIGHTLY
Status: DISCONTINUED | OUTPATIENT
Start: 2023-09-03 | End: 2023-09-20 | Stop reason: HOSPADM

## 2023-09-03 RX ORDER — ACETAMINOPHEN 325 MG/1
650 TABLET ORAL EVERY 6 HOURS PRN
Status: DISCONTINUED | OUTPATIENT
Start: 2023-09-03 | End: 2023-09-20 | Stop reason: HOSPADM

## 2023-09-03 RX ORDER — SODIUM CHLORIDE 9 MG/ML
INJECTION, SOLUTION INTRAVENOUS CONTINUOUS
Status: DISCONTINUED | OUTPATIENT
Start: 2023-09-03 | End: 2023-09-17

## 2023-09-03 RX ORDER — POLYETHYLENE GLYCOL 3350 17 G/17G
17 POWDER, FOR SOLUTION ORAL DAILY
Status: DISCONTINUED | OUTPATIENT
Start: 2023-09-04 | End: 2023-09-10

## 2023-09-03 RX ORDER — INSULIN LISPRO 100 [IU]/ML
0-8 INJECTION, SOLUTION INTRAVENOUS; SUBCUTANEOUS NIGHTLY
Status: DISCONTINUED | OUTPATIENT
Start: 2023-09-03 | End: 2023-09-05 | Stop reason: ALTCHOICE

## 2023-09-03 RX ORDER — ASPIRIN 81 MG/1
81 TABLET ORAL DAILY
Status: DISCONTINUED | OUTPATIENT
Start: 2023-09-04 | End: 2023-09-05 | Stop reason: ALTCHOICE

## 2023-09-03 RX ORDER — SODIUM CHLORIDE 0.9 % (FLUSH) 0.9 %
5-40 SYRINGE (ML) INJECTION PRN
Status: DISCONTINUED | OUTPATIENT
Start: 2023-09-03 | End: 2023-09-20 | Stop reason: HOSPADM

## 2023-09-03 RX ORDER — MEMANTINE HYDROCHLORIDE 5 MG/1
10 TABLET ORAL 2 TIMES DAILY
Status: DISCONTINUED | OUTPATIENT
Start: 2023-09-03 | End: 2023-09-20 | Stop reason: HOSPADM

## 2023-09-03 RX ORDER — SODIUM CHLORIDE 0.9 % (FLUSH) 0.9 %
5-40 SYRINGE (ML) INJECTION EVERY 12 HOURS SCHEDULED
Status: DISCONTINUED | OUTPATIENT
Start: 2023-09-03 | End: 2023-09-20 | Stop reason: HOSPADM

## 2023-09-03 RX ORDER — BISACODYL 5 MG/1
5 TABLET, DELAYED RELEASE ORAL DAILY PRN
Status: DISCONTINUED | OUTPATIENT
Start: 2023-09-03 | End: 2023-09-20 | Stop reason: HOSPADM

## 2023-09-03 RX ORDER — MAGNESIUM HYDROXIDE/ALUMINUM HYDROXICE/SIMETHICONE 120; 1200; 1200 MG/30ML; MG/30ML; MG/30ML
30 SUSPENSION ORAL EVERY 6 HOURS PRN
Status: DISCONTINUED | OUTPATIENT
Start: 2023-09-03 | End: 2023-09-20 | Stop reason: HOSPADM

## 2023-09-03 RX ORDER — MAGNESIUM SULFATE IN WATER 40 MG/ML
2000 INJECTION, SOLUTION INTRAVENOUS PRN
Status: DISCONTINUED | OUTPATIENT
Start: 2023-09-03 | End: 2023-09-20 | Stop reason: HOSPADM

## 2023-09-03 RX ORDER — LOSARTAN POTASSIUM 50 MG/1
50 TABLET ORAL DAILY
Status: DISCONTINUED | OUTPATIENT
Start: 2023-09-04 | End: 2023-09-20 | Stop reason: HOSPADM

## 2023-09-03 RX ORDER — ACETAMINOPHEN 650 MG/1
650 SUPPOSITORY RECTAL EVERY 6 HOURS PRN
Status: DISCONTINUED | OUTPATIENT
Start: 2023-09-03 | End: 2023-09-20 | Stop reason: HOSPADM

## 2023-09-03 RX ORDER — LANOLIN ALCOHOL/MO/W.PET/CERES
3 CREAM (GRAM) TOPICAL NIGHTLY PRN
Status: DISCONTINUED | OUTPATIENT
Start: 2023-09-04 | End: 2023-09-03

## 2023-09-03 RX ORDER — IBUPROFEN 600 MG/1
1 TABLET ORAL PRN
Status: DISCONTINUED | OUTPATIENT
Start: 2023-09-03 | End: 2023-09-20 | Stop reason: HOSPADM

## 2023-09-03 RX ORDER — PROMETHAZINE HYDROCHLORIDE 25 MG/1
12.5 TABLET ORAL EVERY 6 HOURS PRN
Status: DISCONTINUED | OUTPATIENT
Start: 2023-09-03 | End: 2023-09-20 | Stop reason: HOSPADM

## 2023-09-03 RX ORDER — ONDANSETRON 2 MG/ML
4 INJECTION INTRAMUSCULAR; INTRAVENOUS EVERY 6 HOURS PRN
Status: DISCONTINUED | OUTPATIENT
Start: 2023-09-03 | End: 2023-09-20 | Stop reason: HOSPADM

## 2023-09-03 RX ORDER — LANOLIN ALCOHOL/MO/W.PET/CERES
3 CREAM (GRAM) TOPICAL NIGHTLY PRN
Status: DISCONTINUED | OUTPATIENT
Start: 2023-09-03 | End: 2023-09-06

## 2023-09-03 RX ORDER — ATORVASTATIN CALCIUM 10 MG/1
10 TABLET, FILM COATED ORAL DAILY
Status: DISCONTINUED | OUTPATIENT
Start: 2023-09-04 | End: 2023-09-20 | Stop reason: HOSPADM

## 2023-09-03 RX ORDER — SODIUM CHLORIDE 9 MG/ML
INJECTION, SOLUTION INTRAVENOUS PRN
Status: DISCONTINUED | OUTPATIENT
Start: 2023-09-03 | End: 2023-09-20 | Stop reason: HOSPADM

## 2023-09-03 RX ORDER — ENOXAPARIN SODIUM 100 MG/ML
40 INJECTION SUBCUTANEOUS DAILY
Status: DISCONTINUED | OUTPATIENT
Start: 2023-09-04 | End: 2023-09-20 | Stop reason: HOSPADM

## 2023-09-03 RX ADMIN — MEMANTINE 10 MG: 5 TABLET ORAL at 23:35

## 2023-09-03 RX ADMIN — DONEPEZIL HYDROCHLORIDE 10 MG: 5 TABLET, FILM COATED ORAL at 23:35

## 2023-09-03 ASSESSMENT — LIFESTYLE VARIABLES
HOW OFTEN DO YOU HAVE A DRINK CONTAINING ALCOHOL: NEVER
HOW MANY STANDARD DRINKS CONTAINING ALCOHOL DO YOU HAVE ON A TYPICAL DAY: PATIENT DOES NOT DRINK

## 2023-09-03 ASSESSMENT — PAIN - FUNCTIONAL ASSESSMENT: PAIN_FUNCTIONAL_ASSESSMENT: 0-10

## 2023-09-04 PROBLEM — F23 ACUTE PSYCHOSIS (HCC): Status: ACTIVE | Noted: 2023-09-04

## 2023-09-04 LAB
ANION GAP SERPL CALC-SCNC: 4 MMOL/L (ref 2–11)
BASOPHILS # BLD: 0.1 K/UL (ref 0–0.2)
BASOPHILS NFR BLD: 1 % (ref 0–2)
BUN SERPL-MCNC: 27 MG/DL (ref 8–23)
CALCIUM SERPL-MCNC: 10.2 MG/DL (ref 8.3–10.4)
CHLORIDE SERPL-SCNC: 107 MMOL/L (ref 101–110)
CO2 SERPL-SCNC: 28 MMOL/L (ref 21–32)
CREAT SERPL-MCNC: 1.15 MG/DL (ref 0.8–1.5)
DIFFERENTIAL METHOD BLD: ABNORMAL
EKG ATRIAL RATE: 80 BPM
EKG DIAGNOSIS: NORMAL
EKG P AXIS: -21 DEGREES
EKG P-R INTERVAL: 119 MS
EKG Q-T INTERVAL: 369 MS
EKG QRS DURATION: 100 MS
EKG QTC CALCULATION (BAZETT): 423 MS
EKG R AXIS: 76 DEGREES
EKG T AXIS: 55 DEGREES
EKG VENTRICULAR RATE: 79 BPM
EOSINOPHIL # BLD: 0.2 K/UL (ref 0–0.8)
EOSINOPHIL NFR BLD: 2 % (ref 0.5–7.8)
ERYTHROCYTE [DISTWIDTH] IN BLOOD BY AUTOMATED COUNT: 13.4 % (ref 11.9–14.6)
EST. AVERAGE GLUCOSE BLD GHB EST-MCNC: 117 MG/DL
GLUCOSE BLD STRIP.AUTO-MCNC: 116 MG/DL (ref 65–100)
GLUCOSE BLD STRIP.AUTO-MCNC: 118 MG/DL (ref 65–100)
GLUCOSE BLD STRIP.AUTO-MCNC: 130 MG/DL (ref 65–100)
GLUCOSE BLD STRIP.AUTO-MCNC: 135 MG/DL (ref 65–100)
GLUCOSE SERPL-MCNC: 117 MG/DL (ref 65–100)
HBA1C MFR BLD: 5.7 % (ref 4.8–5.6)
HCT VFR BLD AUTO: 38.6 % (ref 41.1–50.3)
HGB BLD-MCNC: 13 G/DL (ref 13.6–17.2)
IMM GRANULOCYTES # BLD AUTO: 0 K/UL (ref 0–0.5)
IMM GRANULOCYTES NFR BLD AUTO: 0 % (ref 0–5)
LYMPHOCYTES # BLD: 1.5 K/UL (ref 0.5–4.6)
LYMPHOCYTES NFR BLD: 19 % (ref 13–44)
MCH RBC QN AUTO: 30.6 PG (ref 26.1–32.9)
MCHC RBC AUTO-ENTMCNC: 33.7 G/DL (ref 31.4–35)
MCV RBC AUTO: 90.8 FL (ref 82–102)
MONOCYTES # BLD: 0.6 K/UL (ref 0.1–1.3)
MONOCYTES NFR BLD: 8 % (ref 4–12)
NEUTS SEG # BLD: 5.5 K/UL (ref 1.7–8.2)
NEUTS SEG NFR BLD: 70 % (ref 43–78)
NRBC # BLD: 0 K/UL (ref 0–0.2)
PLATELET # BLD AUTO: 144 K/UL (ref 150–450)
PMV BLD AUTO: 9.5 FL (ref 9.4–12.3)
POTASSIUM SERPL-SCNC: 3.8 MMOL/L (ref 3.5–5.1)
RBC # BLD AUTO: 4.25 M/UL (ref 4.23–5.6)
RPR SER QL: NONREACTIVE
SERVICE CMNT-IMP: ABNORMAL
SODIUM SERPL-SCNC: 139 MMOL/L (ref 133–143)
WBC # BLD AUTO: 7.9 K/UL (ref 4.3–11.1)

## 2023-09-04 PROCEDURE — 6360000002 HC RX W HCPCS

## 2023-09-04 PROCEDURE — 86592 SYPHILIS TEST NON-TREP QUAL: CPT

## 2023-09-04 PROCEDURE — 85025 COMPLETE CBC W/AUTO DIFF WBC: CPT

## 2023-09-04 PROCEDURE — 6360000002 HC RX W HCPCS: Performed by: FAMILY MEDICINE

## 2023-09-04 PROCEDURE — 83036 HEMOGLOBIN GLYCOSYLATED A1C: CPT

## 2023-09-04 PROCEDURE — 2500000003 HC RX 250 WO HCPCS: Performed by: FAMILY MEDICINE

## 2023-09-04 PROCEDURE — 2580000003 HC RX 258: Performed by: FAMILY MEDICINE

## 2023-09-04 PROCEDURE — 82962 GLUCOSE BLOOD TEST: CPT

## 2023-09-04 PROCEDURE — 96372 THER/PROPH/DIAG INJ SC/IM: CPT

## 2023-09-04 PROCEDURE — 80048 BASIC METABOLIC PNL TOTAL CA: CPT

## 2023-09-04 PROCEDURE — 96374 THER/PROPH/DIAG INJ IV PUSH: CPT

## 2023-09-04 PROCEDURE — 6370000000 HC RX 637 (ALT 250 FOR IP): Performed by: FAMILY MEDICINE

## 2023-09-04 PROCEDURE — 2000000000 HC ICU R&B

## 2023-09-04 PROCEDURE — 93010 ELECTROCARDIOGRAM REPORT: CPT | Performed by: INTERNAL MEDICINE

## 2023-09-04 RX ORDER — HALOPERIDOL 5 MG/ML
10 INJECTION INTRAMUSCULAR ONCE
Status: COMPLETED | OUTPATIENT
Start: 2023-09-04 | End: 2023-09-04

## 2023-09-04 RX ORDER — HALOPERIDOL 5 MG/ML
INJECTION INTRAMUSCULAR
Status: COMPLETED
Start: 2023-09-04 | End: 2023-09-04

## 2023-09-04 RX ADMIN — Medication 3 MG: at 00:29

## 2023-09-04 RX ADMIN — SODIUM CHLORIDE: 9 INJECTION, SOLUTION INTRAVENOUS at 05:16

## 2023-09-04 RX ADMIN — WATER 5 MG: 1 INJECTION INTRAMUSCULAR; INTRAVENOUS; SUBCUTANEOUS at 02:13

## 2023-09-04 RX ADMIN — SODIUM CHLORIDE: 9 INJECTION, SOLUTION INTRAVENOUS at 18:41

## 2023-09-04 RX ADMIN — ENOXAPARIN SODIUM 40 MG: 100 INJECTION SUBCUTANEOUS at 09:35

## 2023-09-04 RX ADMIN — DEXMEDETOMIDINE 0.8 MCG/KG/HR: 100 INJECTION, SOLUTION INTRAVENOUS at 23:11

## 2023-09-04 RX ADMIN — ZIPRASIDONE MESYLATE 20 MG: 20 INJECTION, POWDER, LYOPHILIZED, FOR SOLUTION INTRAMUSCULAR at 08:22

## 2023-09-04 RX ADMIN — DEXMEDETOMIDINE 0.6 MCG/KG/HR: 100 INJECTION, SOLUTION INTRAVENOUS at 15:42

## 2023-09-04 RX ADMIN — HALOPERIDOL 10 MG: 5 INJECTION INTRAMUSCULAR at 02:23

## 2023-09-04 RX ADMIN — DEXMEDETOMIDINE 0.2 MCG/KG/HR: 100 INJECTION, SOLUTION INTRAVENOUS at 08:31

## 2023-09-04 RX ADMIN — HALOPERIDOL LACTATE 10 MG: 5 INJECTION, SOLUTION INTRAMUSCULAR at 02:23

## 2023-09-04 RX ADMIN — WATER 10 MG: 1 INJECTION INTRAMUSCULAR; INTRAVENOUS; SUBCUTANEOUS at 00:19

## 2023-09-04 ASSESSMENT — PAIN SCALES - GENERAL
PAINLEVEL_OUTOF10: 0

## 2023-09-04 NOTE — PLAN OF CARE
Problem: Discharge Planning  Goal: Discharge to home or other facility with appropriate resources  Outcome: Not Progressing     Problem: Pain  Goal: Verbalizes/displays adequate comfort level or baseline comfort level  Outcome: Not Progressing     Problem: Safety - Adult  Goal: Free from fall injury  Outcome: Not Progressing     Problem: Safety - Medical Restraint  Goal: Remains free of injury from restraints (Restraint for Interference with Medical Device)  Outcome: Not Progressing     Problem: ABCDS Injury Assessment  Goal: Absence of physical injury  Outcome: Not Progressing     Problem: Confusion  Goal: Confusion, delirium, dementia, or psychosis is improved or at baseline  Outcome: Not Progressing     Problem: Neurosensory - Adult  Goal: Achieves stable or improved neurological status  Outcome: Not Progressing  Goal: Achieves maximal functionality and self care  Outcome: Not Progressing     Problem: Musculoskeletal - Adult  Goal: Return mobility to safest level of function  Outcome: Not Progressing     Problem: Gastrointestinal - Adult  Goal: Maintains adequate nutritional intake  Outcome: Not Progressing

## 2023-09-04 NOTE — PROGRESS NOTES
Hospitalist Progress Note   Admit Date:  9/3/2023  8:00 PM   Name:  Willie Gracia III   Age:  70 y.o. Sex:  male  :  1955   MRN:  [id number]   Room:  [facility]/    Presenting/Chief Complaint: No chief complaint on file. Reason(s) for Admission: Delirium [R41.0]  Hospital admission due to social situation [Z60.9]  Severe dementia with psychotic disturbance, unspecified dementia type ([address] ) [F03. C2]     Hospital Course:     Willie Gracia III is a 70 y.o. male with medical history of early onset dementia, hypertension, diet-controlled diabetes admitted with worsening confusion and hallucination likely in the setting of progressive dementia. Patient becoming increasingly agitated and combative requiring antipsychotic medication and four-point restraints. Transferred to ICU for Precedex drip. Subjective & 24hr Events:   Patient is seen at the bedside, combative towards staff, had broken his bilateral soft wrist restraints. Requiring 4 staff member to keep him and other safe. Geodon IM was ordered stat. Also patient was placed in four-point restraints. Will start patient on Precedex gtt. Psych consulted. Assessment & Plan:     Hospital admission due to social situation:  Progressive moderate to severe dementia with psychotic disturbances: At this point, it appears that he is just having worsening of his known dementia. There does not seem to be a particular reversible cause or trigger. He was given IV Zyprexa and Haldol in the ED  Received Geodon in the morning   He is also required four-point restraints due to combativeness and danger to himself and others. He is a very high fall risk. Continuing Aricept and Namenda  Started patient on Precedex gtt.   Psych consulted, appreciate recommendation  I anticipate that he will need a geriatric psych unit to optimize his medications    HTN:  Continue home medication of Cozaar    Controlled type 2 diabetes mellitus:  Diet controlled, added 5. 8 1.7 - 8.2 K/UL    Lymphocytes Absolute 1.2 0.5 - 4.6 K/UL    Monocytes Absolute 0.6 0.1 - 1.3 K/UL    Eosinophils Absolute 0.2 0.0 - 0.8 K/UL    Basophils Absolute 0.1 0.0 - 0.2 K/UL    Absolute Immature Granulocyte 0.0 0.0 - 0.5 K/UL   CMP    Collection Time: 09/03/23  8:15 PM   Result Value Ref Range    Sodium 140 133 - 143 mmol/L    Potassium 3.8 3.5 - 5.1 mmol/L    Chloride 105 101 - 110 mmol/L    CO2 30 21 - 32 mmol/L    Anion Gap 5 2 - 11 mmol/L    Glucose 131 (H) 65 - 100 mg/dL    BUN 25 (H) 8 - 23 MG/DL    Creatinine 1.36 0.8 - 1.5 MG/DL    Est, Glom Filt Rate 56 (L) >60 ml/min/1.73m2    Calcium 11.1 (H) 8.3 - 10.4 MG/DL    Total Bilirubin 1.4 (H) 0.2 - 1.1 MG/DL    ALT 25 12 - 65 U/L    AST 23 15 - 37 U/L    Alk Phosphatase 66 50 - 136 U/L    Total Protein 7.4 6.3 - 8.2 g/dL    Albumin 4.0 3.2 - 4.6 g/dL    Globulin 3.4 2.8 - 4.5 g/dL    Albumin/Globulin Ratio 1.2 0.4 - 1.6     Lactic Acid Now and in 2 Hours    Collection Time: 09/03/23  8:15 PM   Result Value Ref Range    Lactic Acid, Plasma 1.0 0.4 - 2.0 MMOL/L   TSH with Reflex    Collection Time: 09/03/23  8:15 PM   Result Value Ref Range    TSH w Free Thyroid if Abnormal 1.85 0.358 - 3.740 UIU/ML   EKG 12 Lead    Collection Time: 09/03/23  8:31 PM   Result Value Ref Range    Ventricular Rate 79 BPM    Atrial Rate 80 BPM    P-R Interval 119 ms    QRS Duration 100 ms    Q-T Interval 369 ms    QTc Calculation (Bazett) 423 ms    P Axis -21 degrees    R Axis 76 degrees    T Axis 55 degrees    Diagnosis       Sinus rhythm  Borderline short OH interval    Confirmed by ST SID TOMLIN MD (), MARY ESPINAL ([id number]) on 9/4/2023 8:08:19 AM     POCT Glucose    Collection Time: 09/03/23 11:13 PM   Result Value Ref Range    POC Glucose 118 (H) 65 - 100 mg/dL    Performed by: Jimmy    Hemoglobin A1c    Collection Time: 09/04/23  4:47 AM   Result Value Ref Range    Hemoglobin A1C 5.7 (H) 4.8 - 5.6 %    eAG 117 mg/dL   Basic Metabolic Panel w/ Reflex to MG (COZAAR) tablet 50 mg  50 mg Oral Daily    sertraline (ZOLOFT) tablet 150 mg  150 mg Oral Daily    atorvastatin (LIPITOR) tablet 10 mg  10 mg Oral Daily    insulin lispro (HUMALOG) injection vial 0-10 Units  0-10 Units SubCUTAneous TID WC    insulin lispro (HUMALOG) injection vial 0-8 Units  0-8 Units SubCUTAneous Nightly    glucose chewable tablet 16 g  4 tablet Oral PRN    dextrose bolus 10% 125 mL  125 mL IntraVENous PRN    Or    dextrose bolus 10% 250 mL  250 mL IntraVENous PRN    Glucagon Emergency KIT 1 mg  1 mg SubCUTAneous PRN    dextrose 10 % infusion   IntraVENous Continuous PRN    0.9 % sodium chloride infusion   IntraVENous Continuous    sodium chloride flush 0.9 % injection 5-40 mL  5-40 mL IntraVENous 2 times per day    sodium chloride flush 0.9 % injection 5-40 mL  5-40 mL IntraVENous PRN    0.9 % sodium chloride infusion   IntraVENous PRN    potassium chloride (KLOR-CON M) extended release tablet 40 mEq  40 mEq Oral PRN    Or    potassium bicarb-citric acid (EFFER-K) effervescent tablet 40 mEq  40 mEq Oral PRN    Or    potassium chloride 10 mEq/100 mL IVPB (Peripheral Line)  10 mEq IntraVENous PRN    magnesium sulfate 2000 mg in 50 mL IVPB premix  2,000 mg IntraVENous PRN    enoxaparin (LOVENOX) injection 40 mg  40 mg SubCUTAneous Daily    promethazine (PHENERGAN) tablet 12.5 mg  12.5 mg Oral Q6H PRN    Or    ondansetron (ZOFRAN) injection 4 mg  4 mg IntraVENous Q6H PRN    polyethylene glycol (GLYCOLAX) packet 17 g  17 g Oral Daily    bisacodyl (DULCOLAX) EC tablet 5 mg  5 mg Oral Daily PRN    aluminum & magnesium hydroxide-simethicone (MAALOX) 200-200-20 MG/5ML suspension 30 mL  30 mL Oral Q6H PRN    acetaminophen (TYLENOL) tablet 650 mg  650 mg Oral Q6H PRN    Or    acetaminophen (TYLENOL) suppository 650 mg  650 mg Rectal Q6H PRN    tuberculin injection 5 Units  5 Units IntraDERmal Once    melatonin tablet 3 mg  3 mg Oral Nightly PRN       Signed:  Ej Jacobo MD    Part of this note may have been

## 2023-09-04 NOTE — CARE COORDINATION
Sw reviewed chart. On 8/29 pt was brought to the ER due to being found sitting outside his home in the rain, wandering, and claiming there were people in his house. Pt was adm this time from ER (no notes avail) Pt is a 70 yr old gentleman who was adm yesterday due to Acute Delirium secondary to early onset dementia with psychotic (hallucinations/paranoid) disturbances. Pt apparently lives alone and his sister Rosie Boyd is his HCPOA. Pt was originally on Med/Surg floor, but became too combative and got out of his restrains. It was taking 4 staff members to handle pt and he had to be moved to ICU for close observation and placed on a Precedex drip. There was a Psych Consult placed but Miguel Burrows is off today and pt had been given additional sedating mediations and was not appropriate at this time for TelePsych due to being sedated. Reviewed pt's home psychotropic medication list to see what he was on prior to admission. Unsure of complete list of medications. Pt may need [facility] for medication management/adjustments at discharge. Ideally if pt is no longer able to live alone due to his change in mentation, he would be appropriate for an assisted with Memory Care. Sw will need to talk with pt's sister and with pt once /if he is able to be a part of his discharge planning process. Sw to follow closely and assist with this very difficult situation.    Rufino Chung

## 2023-09-04 NOTE — ED NOTES
TRANSFER - OUT REPORT:    Verbal report given to [address] [place] [address] on Sole Strong III  being transferred to [address] [place] [address] for routine progression of care. Report consisted of patient's Situation, Background, Assessment and   Recommendations(SBAR). Information from the following report(s) ED SBAR was reviewed with the receiving nurse. [facility] Assessment:    Presents to emergency department  because of falls (Syncope, seizure, or loss of consciousness): No  Age > 70: No  Altered Mental Status, Intoxication with alcohol or substance confusion (Disorientation, impaired judgment, poor safety awaremess, or inability to follow instructions): Yes  Impaired Mobility: Ambulates or transfers with assistive devices or assistance; Unable to ambulate or transer.: No             Lines:   Peripheral IV 09/03/23 Left Antecubital (Active)        Opportunity for questions and clarification was provided.       Patient transported with:  Monique Brown RN  09/03/23 8280

## 2023-09-04 NOTE — ED PROVIDER NOTES
Emergency Department Provider Note       PCP: Oscar Quiroz MD   Age: 70 y.o. Sex: male     Jose Leo Admitted 09/03/2023 10:23:41 PM       ICD-10-CM    1. Delirium  R41.0       2. Severe dementia with psychotic disturbance, unspecified dementia type ([address])  F03. C2           Medical Decision Making     Complexity of Problems Addressed:  1 or more acute illnesses that pose a threat to life or bodily function. Data Reviewed and Analyzed:  I independently ordered and reviewed each unique test.  I reviewed external records: ED visit note from an outside group. I reviewed external records: previous lab results from outside ED. I reviewed external records: previous imaging study including radiologist interpretation. The patients assessment required an independent historian: Spoke with both his daughters. The reason they were needed is dementia and important historical information not provided by the patient. I independently interpreted the cardiac monitor rhythm strip sinus. I interpreted the X-rays clear chest x-ray. Discussion of management or test interpretation. Patient is very delirious and has difficulty holding a conversation. Family states that this is much different than how he was a week or 2 ago. He had some baseline dementia and I did review the CT scans from where that showed some old infarcts as well. He has had no new falls since then so I did not repeat CT imaging. Discussed case with Dr. Bebe Porter with the hospitalist and they are going to admit him for further evaluation. Risk of Complications and/or Morbidity of Patient Management:  Discussion with external consultants. Shared medical decision making was utilized in creating the patients health plan today. Diagnosis or care significantly limited by social determinants of health dementia. Considerations: The following items were considered but not ordered: Repeat CT scans. History      Alireza Davalos is a 70 y.o. Activity: Sufficiently Active    Days of Exercise per Week: 5 days    Minutes of Exercise per Session: 90 min   Housing Stability: Unknown    Unstable Housing in the Last Year: No        Current Discharge Medication List        CONTINUE these medications which have NOT CHANGED    Details   cetirizine (ZYRTEC) 10 MG tablet       tacrolimus (PROTOPIC) 0.1 % ointment       predniSONE (DELTASONE) 10 MG tablet Take 4 a day for 10 days, then 3 a day for 4 days  Qty: 10 tablet, Refills: 0    Associated Diagnoses: Poison ivy dermatitis; Pruritus      donepezil (ARICEPT) 10 MG tablet Take one q hs  Qty: 90 tablet, Refills: 3    Associated Diagnoses: Memory loss      olmesartan (BENICAR) 20 MG tablet Take 1 tablet by mouth daily  Qty: 90 tablet, Refills: 1    Associated Diagnoses: Primary hypertension      sertraline (ZOLOFT) 100 MG tablet Take 1.5 tablets by mouth daily  Qty: 135 tablet, Refills: 1    Associated Diagnoses: Recurrent major depressive disorder, in partial remission (HCC)      simvastatin (ZOCOR) 10 MG tablet Take 1 tablet by mouth nightly TAKE ONE TABLET BY MOUTH AT BEDTIME  Qty: 90 tablet, Refills: 1    Associated Diagnoses: Family history of coronary artery bypass surgery; Dyslipidemia      ipratropium (ATROVENT) 0.03 % nasal spray 2 sprays by Each Nostril route in the morning and 2 sprays in the evening. Qty: 30 mL, Refills: 5    Associated Diagnoses: Nasal drainage      triamcinolone (KENALOG) 0.1 % cream Apply topically 2 times daily.   Qty: 28.4 g, Refills: 3    Associated Diagnoses: Pruritus      memantine (NAMENDA) 10 MG tablet Take 1 tablet by mouth 2 times daily  Qty: 180 tablet, Refills: 3      Aspirin 81 MG CAPS Take 81 mg by mouth daily              Results for orders placed or performed during the hospital encounter of 09/03/23   XR CHEST PORTABLE    Narrative    Examination: AP view of the chest    Indication: Altered mental status    Comparison: No prior study is available for Lactic Acid Now and in 2 Hours   Result Value Ref Range    Lactic Acid, Plasma 1.0 0.4 - 2.0 MMOL/L   TSH with Reflex   Result Value Ref Range    TSH w Free Thyroid if Abnormal 1.85 0.358 - 3.740 UIU/ML   Urinalysis, Micro   Result Value Ref Range    WBC, UA 10-20 0 /hpf    RBC, UA 0-3 0 /hpf    Epithelial Cells UA 3-5 0 /hpf    BACTERIA, URINE 0 0 /hpf    Casts 0 0 /lpf    Crystals MODERATE 0 /LPF    Mucus, UA 0 0 /lpf    Other observations RESULTS VERIFIED MANUALLY     EKG 12 Lead   Result Value Ref Range    Ventricular Rate 79 BPM    Atrial Rate 80 BPM    P-R Interval 119 ms    QRS Duration 100 ms    Q-T Interval 369 ms    QTc Calculation (Bazett) 423 ms    P Axis -21 degrees    R Axis 76 degrees    T Axis 55 degrees    Diagnosis Sinus rhythm  Borderline short LA interval           XR CHEST PORTABLE   Final Result   Impression:   No evidence of an acute pleural or pulmonary parenchymal abnormality. Chace Becker M.D.    9/3/2023 8:30:00 PM                        Voice dictation software was used during the making of this note. This software is not perfect and grammatical and other typographical errors may be present. This note has not been completely proofread for errors.       Ashley Castillo MD  09/03/23 4770

## 2023-09-04 NOTE — H&P
Hospitalist History and Physical   Admit Date:  9/3/2023  8:00 PM   Name:  Baldo Watson   Age:  70 y.o. Sex:  male  :  1955   MRN:  [id number]   Room:  [place] [address]    Presenting Complaint: No chief complaint on file. Reason(s) for Admission: Hospital admission due to social situation [Z60.9]     History of Present Illness:   Patient was discussed with the ER provider prior to seeing the patient. Baldo Watson is a 70 y.o. male with medical history of early onset dementia, hypertension, diet-controlled diabetes, who presented with worsening confusion and hallucinations. History is provided by the family. I have also discussed case with the ER provider and reviewed some of the medical record. Family has noted a significant decline over the past couple weeks. Patient does live alone. He has now been having hallucinations, primarily seeing people who are not there. On one occasion he left his house in the rain because he thought people were in the home. He called 911 himself. He was found outside in the rain. At that time he was taken to [facility] ER. They did not find any abnormalities on work-up and discharged him home. He did follow-up with his own PCP who had planned to refer him to neurology. He is brought in tonight for similar findings. He is confused, having hallucinations, difficulty with conversation. He just had a head CT 3 days ago and no new falls or concerns, so this was not repeated. ER work-up tonight is also unrevealing. However he is not safe to be discharged home. His sister is his POA and states that she cannot take care of him like this. She denies that there is been any recent illness or medication changes. They cannot think of anything that would have predisposed to this. She reports that he has never acted like this before. He continued to worsen after admission, becoming increasingly agitated and eventually combative.   He ultimately required

## 2023-09-04 NOTE — PROGRESS NOTES
Psych consult communicated through [employer] to Kervin Townsend NP. Phil Martínez called to notify staff that she is not in today, and if consult needs completion today, order should be changed to a Tele-Psych consult. RN notified NP that patient had recently received Geodon IM and was started on Precedex gtt for sedation. Kervin Townsend NP agreed that patient is not appropriate at this time for a Tele Psych consult. Will re-evaluate tomorrow. Dr. Ashley Mittal notified.

## 2023-09-04 NOTE — ED TRIAGE NOTES
PT moved off EMS stretcher to bed. Pt. From home. Pt. C/o hallucinations x2weeks. Pt. C/o seeing people walking around. Pt. Has early onset dementia.

## 2023-09-04 NOTE — PROGRESS NOTES
Occupational & Physical Therapy Note:    Attempted to see patient this AM for occupational & physical therapy evaluation session. Patient currently in four-point restraints and receiving antipsychotic medications due to being physically aggressive and combative with staff. Psych has been consulted and patient will likely need inpatient maría-psych stay, per MD. Will follow and re-attempt tomorrow, 9/5/23 as schedule permits/patient available.      Thank you,    Rain Moulton, OTR/L  Nadya Goldstein, PT

## 2023-09-04 NOTE — PROGRESS NOTES
Patient became aggressively combative towards staff around 2017, requiring 4 staff members to keep him and staff safe as he had broken his soft bilateral wrist restraints. Dr. Ginette Skiff notified and came to bedside at [phone]. One time dose of Geodon ordered STAT. Violent restraints ordered x4 and applied as patient kept breaking the soft bilateral wrist restraints and kept trying to kick and knee staff. House Supervisor, Manjeet Min RN at bedside and aware. Precedex gtt started. Patient's sister Jose Payne was called and message left to return call. When she responded around 0900, she was updated appropriately.

## 2023-09-04 NOTE — PROGRESS NOTES
Patient admitted from unit to floor s/d to agitation requiring 4-point restraints. Admission database completed with sister Nichelle Clemons s/d to [facility]. Per Ms. Sawant she is HCPOA and will bring paper work later today when she visits. Phone numbers for family members obtained and placed in chart. Family unsure of home medications and will bring the medications next visit with patient. Family provided with unit phone number, security code, and unit visitation policy.

## 2023-09-04 NOTE — PROGRESS NOTES
TRANSFER - OUT REPORT:    Verbal report given to JERICHO Thayer on [employer] III  being transferred to ICU for routine progression of patient care       Report consisted of patient's Situation, Background, Assessment and   Recommendations(SBAR). Information from the following report(s) Nurse Handoff Report, Adult Overview, and MAR was reviewed with the receiving nurse. Lines:   Peripheral IV 09/03/23 Left Antecubital (Active)        Opportunity for questions and clarification was provided.       Patient transported with:  Registered Nurse

## 2023-09-04 NOTE — PROGRESS NOTES
TRANSFER - IN REPORT:    Verbal report received from JERICHO Harding on Anais Chaparro III  being received from ED for routine progression of patient care      Report consisted of patient's Situation, Background, Assessment and   Recommendations(SBAR). Information from the following report(s) Nurse Handoff Report and ED SBAR was reviewed with the receiving nurse. Opportunity for questions and clarification was provided. Assessment completed upon patient's arrival to unit and care assumed.

## 2023-09-05 LAB
AMMONIA PLAS-SCNC: 29 UMOL/L (ref 24.9–68)
ANION GAP SERPL CALC-SCNC: 4 MMOL/L (ref 2–11)
BASOPHILS # BLD: 0 K/UL (ref 0–0.2)
BASOPHILS NFR BLD: 0 % (ref 0–2)
BUN SERPL-MCNC: 25 MG/DL (ref 8–23)
CALCIUM SERPL-MCNC: 9.5 MG/DL (ref 8.3–10.4)
CHLORIDE SERPL-SCNC: 109 MMOL/L (ref 101–110)
CO2 SERPL-SCNC: 27 MMOL/L (ref 21–32)
CREAT SERPL-MCNC: 0.91 MG/DL (ref 0.8–1.5)
DIFFERENTIAL METHOD BLD: ABNORMAL
EOSINOPHIL # BLD: 0.2 K/UL (ref 0–0.8)
EOSINOPHIL NFR BLD: 4 % (ref 0.5–7.8)
ERYTHROCYTE [DISTWIDTH] IN BLOOD BY AUTOMATED COUNT: 13.1 % (ref 11.9–14.6)
GLUCOSE BLD STRIP.AUTO-MCNC: 127 MG/DL (ref 65–100)
GLUCOSE SERPL-MCNC: 129 MG/DL (ref 65–100)
HCT VFR BLD AUTO: 40.6 % (ref 41.1–50.3)
HGB BLD-MCNC: 13.5 G/DL (ref 13.6–17.2)
IMM GRANULOCYTES # BLD AUTO: 0 K/UL (ref 0–0.5)
IMM GRANULOCYTES NFR BLD AUTO: 0 % (ref 0–5)
LYMPHOCYTES # BLD: 0.9 K/UL (ref 0.5–4.6)
LYMPHOCYTES NFR BLD: 13 % (ref 13–44)
MCH RBC QN AUTO: 30.3 PG (ref 26.1–32.9)
MCHC RBC AUTO-ENTMCNC: 33.3 G/DL (ref 31.4–35)
MCV RBC AUTO: 91.2 FL (ref 82–102)
MONOCYTES # BLD: 0.4 K/UL (ref 0.1–1.3)
MONOCYTES NFR BLD: 6 % (ref 4–12)
NEUTS SEG # BLD: 5.4 K/UL (ref 1.7–8.2)
NEUTS SEG NFR BLD: 78 % (ref 43–78)
NRBC # BLD: 0 K/UL (ref 0–0.2)
PLATELET # BLD AUTO: 143 K/UL (ref 150–450)
PMV BLD AUTO: 9.6 FL (ref 9.4–12.3)
POTASSIUM SERPL-SCNC: 4.2 MMOL/L (ref 3.5–5.1)
RBC # BLD AUTO: 4.45 M/UL (ref 4.23–5.6)
SERVICE CMNT-IMP: ABNORMAL
SODIUM SERPL-SCNC: 140 MMOL/L (ref 133–143)
VIT B12 SERPL-MCNC: 657 PG/ML (ref 193–986)
WBC # BLD AUTO: 6.9 K/UL (ref 4.3–11.1)

## 2023-09-05 PROCEDURE — 82962 GLUCOSE BLOOD TEST: CPT

## 2023-09-05 PROCEDURE — 2580000003 HC RX 258: Performed by: HOSPITALIST

## 2023-09-05 PROCEDURE — 82140 ASSAY OF AMMONIA: CPT

## 2023-09-05 PROCEDURE — 2000000000 HC ICU R&B

## 2023-09-05 PROCEDURE — 82607 VITAMIN B-12: CPT

## 2023-09-05 PROCEDURE — 97165 OT EVAL LOW COMPLEX 30 MIN: CPT

## 2023-09-05 PROCEDURE — 85025 COMPLETE CBC W/AUTO DIFF WBC: CPT

## 2023-09-05 PROCEDURE — 2700000000 HC OXYGEN THERAPY PER DAY

## 2023-09-05 PROCEDURE — 2580000003 HC RX 258: Performed by: FAMILY MEDICINE

## 2023-09-05 PROCEDURE — 2500000003 HC RX 250 WO HCPCS: Performed by: FAMILY MEDICINE

## 2023-09-05 PROCEDURE — 51702 INSERT TEMP BLADDER CATH: CPT

## 2023-09-05 PROCEDURE — 6370000000 HC RX 637 (ALT 250 FOR IP): Performed by: FAMILY MEDICINE

## 2023-09-05 PROCEDURE — 97535 SELF CARE MNGMENT TRAINING: CPT

## 2023-09-05 PROCEDURE — 6360000002 HC RX W HCPCS: Performed by: HOSPITALIST

## 2023-09-05 PROCEDURE — 97161 PT EVAL LOW COMPLEX 20 MIN: CPT

## 2023-09-05 PROCEDURE — 36415 COLL VENOUS BLD VENIPUNCTURE: CPT

## 2023-09-05 PROCEDURE — 97530 THERAPEUTIC ACTIVITIES: CPT

## 2023-09-05 PROCEDURE — 6360000002 HC RX W HCPCS: Performed by: FAMILY MEDICINE

## 2023-09-05 PROCEDURE — 80048 BASIC METABOLIC PNL TOTAL CA: CPT

## 2023-09-05 RX ORDER — OLANZAPINE 5 MG/1
5 TABLET, ORALLY DISINTEGRATING ORAL EVERY 6 HOURS PRN
Status: DISCONTINUED | OUTPATIENT
Start: 2023-09-05 | End: 2023-09-06

## 2023-09-05 RX ORDER — OLANZAPINE 5 MG/1
10 TABLET, ORALLY DISINTEGRATING ORAL ONCE
Status: COMPLETED | OUTPATIENT
Start: 2023-09-05 | End: 2023-09-05

## 2023-09-05 RX ORDER — ASPIRIN 81 MG/1
81 TABLET, CHEWABLE ORAL DAILY
Status: DISCONTINUED | OUTPATIENT
Start: 2023-09-05 | End: 2023-09-20 | Stop reason: HOSPADM

## 2023-09-05 RX ORDER — FOLIC ACID 1 MG/1
1 TABLET ORAL DAILY
Status: DISCONTINUED | OUTPATIENT
Start: 2023-09-05 | End: 2023-09-06

## 2023-09-05 RX ADMIN — OLANZAPINE 10 MG: 5 TABLET, ORALLY DISINTEGRATING ORAL at 09:14

## 2023-09-05 RX ADMIN — POLYETHYLENE GLYCOL 3350 17 G: 17 POWDER, FOR SOLUTION ORAL at 09:15

## 2023-09-05 RX ADMIN — SODIUM CHLORIDE, PRESERVATIVE FREE 10 ML: 5 INJECTION INTRAVENOUS at 20:05

## 2023-09-05 RX ADMIN — SERTRALINE 150 MG: 100 TABLET, FILM COATED ORAL at 09:14

## 2023-09-05 RX ADMIN — DONEPEZIL HYDROCHLORIDE 10 MG: 5 TABLET, FILM COATED ORAL at 20:05

## 2023-09-05 RX ADMIN — ASPIRIN 81 MG 81 MG: 81 TABLET ORAL at 09:24

## 2023-09-05 RX ADMIN — DEXMEDETOMIDINE 1.2 MCG/KG/HR: 100 INJECTION, SOLUTION INTRAVENOUS at 04:44

## 2023-09-05 RX ADMIN — MEMANTINE 10 MG: 5 TABLET ORAL at 09:14

## 2023-09-05 RX ADMIN — SODIUM CHLORIDE: 9 INJECTION, SOLUTION INTRAVENOUS at 07:38

## 2023-09-05 RX ADMIN — ATORVASTATIN CALCIUM 10 MG: 10 TABLET, FILM COATED ORAL at 09:14

## 2023-09-05 RX ADMIN — DEXMEDETOMIDINE 1 MCG/KG/HR: 100 INJECTION, SOLUTION INTRAVENOUS at 09:24

## 2023-09-05 RX ADMIN — ZIPRASIDONE MESYLATE 10 MG: 20 INJECTION, POWDER, LYOPHILIZED, FOR SOLUTION INTRAMUSCULAR at 21:50

## 2023-09-05 RX ADMIN — ENOXAPARIN SODIUM 40 MG: 100 INJECTION SUBCUTANEOUS at 09:22

## 2023-09-05 RX ADMIN — DEXMEDETOMIDINE 1.5 MCG/KG/HR: 100 INJECTION, SOLUTION INTRAVENOUS at 22:21

## 2023-09-05 RX ADMIN — DEXMEDETOMIDINE 0.8 MCG/KG/HR: 100 INJECTION, SOLUTION INTRAVENOUS at 14:35

## 2023-09-05 RX ADMIN — FOLIC ACID 1 MG: 1 TABLET ORAL at 13:32

## 2023-09-05 RX ADMIN — DEXMEDETOMIDINE 1.2 MCG/KG/HR: 100 INJECTION, SOLUTION INTRAVENOUS at 19:22

## 2023-09-05 RX ADMIN — OLANZAPINE 5 MG: 5 TABLET, ORALLY DISINTEGRATING ORAL at 16:06

## 2023-09-05 RX ADMIN — MEMANTINE 10 MG: 5 TABLET ORAL at 20:05

## 2023-09-05 RX ADMIN — SODIUM CHLORIDE, PRESERVATIVE FREE 10 ML: 5 INJECTION INTRAVENOUS at 09:22

## 2023-09-05 ASSESSMENT — PAIN SCALES - GENERAL
PAINLEVEL_OUTOF10: 0

## 2023-09-05 NOTE — CARE COORDINATION
09/05/23 1301   Service Assessment   Patient Orientation Alert and Oriented   Cognition Alert   History Provided By Other (see comment); Child/Family  (sister Shahana Dumont)   Support Systems Family Members   PCP Verified by CM Yes   Prior Functional Level Independent in ADLs/IADLs   Current Functional Level Other (see comment)  (pending therapy evaluations)   Can patient return to prior living arrangement Yes   Family able to assist with home care needs: Yes   Would you like for me to discuss the discharge plan with any other family members/significant others, and if so, who? Yes  (sister)   Financial Resources Medicare   Community Resources None   Social/Functional History   Lives With Alone   Type of [address]   Discharge Planning   Type of Residence House   Living Arrangements Alone   Current Services Prior To Admission None   Potential Assistance Purchasing Medications No   Patient expects to be discharged to: Unknown   Services At/After Discharge   Transition of Care Consult (CM Consult) Discharge Planning     RN RAFA spoke with the patient's sister over the telephone and discussed discharge planning. The patient lives alone and was independent of ADLs prior to admission. He does not use DMEs or external services. He may need long term placement.  Discharge planning is pending his clinical course in the hospital.

## 2023-09-05 NOTE — CARE COORDINATION
Discharge planning was discussed with the primary nurse. No visitors are at the bedside. An [employer] and resources for private caregivers, [employer], [facility], and assisted living facilities were placed at the bedside. He's pending therapy evaluations and a psychiatry consult. RN CM called his sister Reena Nunn at [phone] and discussed discharge planning. RN CM informed her of the resources that were left at the bedside. She stated she has placed a few calls to some skilled nursing facilities and is pending return phone calls. RN CM encouraged her to review [email or web address] and also discussed assisted living facilities and [address] [place] [address] with her. She was provided with the phone number for [facility]. She confirmed she is aware of the potential out of pocket costs for long term placement.

## 2023-09-05 NOTE — PROGRESS NOTES
Comprehensive Nutrition Assessment    Type and Reason for Visit:    Malnutrition Screening Tool: Malnutrition Screen  Have you recently lost weight without trying?: Unsure of amount of weight loss (2 points)  Have you been eating poorly because of a decreased appetite?: No (0 points)  Malnutrition Screening Tool Score: 2    Nutrition Recommendations/Plan:   Meals and Snacks:  Diet: Continue current order  Nutrition Supplement Therapy:  Medical food supplement therapy:  Initiate Ensure Enlive TID as able with mental status. Move to Ensure Clear TID if patient does not tolerate, nursing to provide at bedside. Malnutrition Assessment:  Malnutrition Status: At risk for malnutrition (Comment) (poor PO intake with changes in mentation)    NFPE deffered 2/2 sedated and combative dementia. No visual wasting noted. Nutrition Assessment:  Nutrition History: Pt unable to provide nutrition hx 2/2 dementia. JERICHO Palumbo provided nutrition hx to RD. Pt previously lives alone at home. States that family was brining patient meals daily 2/2 worsening dementia/confusion as he was forgetting to eat at times. Eats well when has food provided. Weight hx shows overall stable/increasing weight: 9/27/22 189 lbs, 12/14/22: 189 lbs, CBW of 196 lbs (bed weight- 9/5/23). Do You Have Any Cultural, [employer]ism, or Ethnic Food Preferences?: No   Nutrition Background:       PMH sig for: early onset dementia, HTN, DM - controlled. Admitted with worsening confusion and increased agitation. Combative upon admission - on precedex currently. [facility] for placement into memory care center. Nutrition Interval:  Pt with minimal PO intake per RN. Likely worsened by sedation. JERICHO Palumbo reports pt tends to do best with full/clear liquids: took juice, applesauce, this AM. (Noted as 1-25% of meal in flowsheet). Discussed with nurse trial of Ensure Enlive at this time to assist in PO intake. Up to TID as able.  If patient does not tolerate Ensure, trial

## 2023-09-05 NOTE — PROGRESS NOTES
ACUTE OCCUPATIONAL THERAPY GOALS:   (Developed with and agreed upon by patient and/or caregiver.)  1. Patient will perform grooming with min assist.  2. Patient will perform upper body dressing with min assist.  3. Patient will perform lower body dressing with min assist.  4. Patient will perform bathing with min assist.  5. Patient will perform toileting and toilet transfer with min assist.  6. Patient will perform ADL functional mobility and tranfers in room with min assist.  7. Patient to follow simple directions with mod verbal and tactile cues. Goals to be achieved in 7 days. OCCUPATIONAL THERAPY Initial Assessment and PM       OT Visit Days: 1  Acknowledge Orders  Time  OT Charge Capture  Rehab Caseload Tracker      Jaime Valles is a 70 y.o. male   PRIMARY DIAGNOSIS: Hospital admission due to social situation  Delirium [R41.0]  Hospital admission due to social situation [Z60.9]  Severe dementia with psychotic disturbance, unspecified dementia type ([address]) [F03. C2]  Acute psychosis ([address]) [F23]       Reason for Referral: Other lack of cordination (R27.8)  Difficulty in walking, Not elsewhere classified (R26.2)  Other abnormalities of gait and mobility (R26.89)  Inpatient: Payor: MEDICARE / Plan: MEDICARE PART A AND B / Product Type: *No Product type* /     ASSESSMENT:     REHAB RECOMMENDATIONS:   Recommendation to date pending progress:  Setting:  Short-term Rehab    Equipment:    To Be Determined     ASSESSMENT:  Mr. Bertrand Males sitting up in recliner. Nursing had gotten him to the recliner. He was total assist for dressing,and bathing. He is talking but making gibberish but clear at times verbalizations. He was cooperative but needed hand held assist to participate. CNA reported he was unable to approximate his mouth to the straw today. He stood with min x 2 and took steps into the hallway and then felt shaky and returned to sit. He was set up in his recliner. Nursing was present.  He would benefit Coordination  Decreased Gait Ability  Decreased Safety Awareness  Decreased Strength  Decreased Transfer Abilities   INTERVENTIONS PLANNED:  (Benefits and precautions of occupational therapy have been discussed with the patient.)  Self Care Training  Therapeutic Activity  Therapeutic Exercise/HEP  Neuromuscular Re-education  Education         TREATMENT:     EVALUATION: LOW COMPLEXITY: (Untimed Charge)    TREATMENT:       TREATMENT GRID:  N/A    AFTER TREATMENT PRECAUTIONS: Bed/Chair Locked, Call light within reach, Chair, Needs within reach, Restraints , and RN at bedside    INTERDISCIPLINARY COLLABORATION:  RN/ PCT, PT/ PTA, and OT/ BOOGIE    EDUCATION:  Education Given To: Patient  Education Provided: Transfer Training  Education Method: Verbal;Demonstration  Barriers to Learning: Cognition  Education Outcome: Continued education needed    TOTAL TREATMENT DURATION AND TIME:  Time In: 1425  Time Out: [address]  Minutes: [address], OT

## 2023-09-05 NOTE — PROGRESS NOTES
Occupational and Physical Therapy Note:    Attempted to see patient this AM for occupational and physical therapy evaluation session. Hold today per nursing. Will follow and re-attempt as schedule permits/patient available.  Thank you,    Lio Ramírez, OT    Rehab Caseload Tracker

## 2023-09-06 PROBLEM — F03.C2 SEVERE DEMENTIA WITH PSYCHOTIC DISTURBANCE (HCC): Status: ACTIVE | Noted: 2023-09-06

## 2023-09-06 PROBLEM — F01.B2 MODERATE VASCULAR DEMENTIA WITH PSYCHOTIC DISTURBANCE (HCC): Status: ACTIVE | Noted: 2022-04-05

## 2023-09-06 PROBLEM — R41.0 DELIRIUM: Status: ACTIVE | Noted: 2023-09-06

## 2023-09-06 PROBLEM — R46.89 COGNITIVE AND BEHAVIORAL CHANGES: Status: ACTIVE | Noted: 2023-09-06

## 2023-09-06 PROBLEM — F01.B0 MODERATE VASCULAR DEMENTIA (HCC): Chronic | Status: ACTIVE | Noted: 2022-04-05

## 2023-09-06 PROBLEM — R41.89 COGNITIVE AND BEHAVIORAL CHANGES: Status: ACTIVE | Noted: 2023-09-06

## 2023-09-06 LAB
ANION GAP SERPL CALC-SCNC: 5 MMOL/L (ref 2–11)
BASOPHILS # BLD: 0 K/UL (ref 0–0.2)
BASOPHILS NFR BLD: 1 % (ref 0–2)
BUN SERPL-MCNC: 23 MG/DL (ref 8–23)
CALCIUM SERPL-MCNC: 9.1 MG/DL (ref 8.3–10.4)
CHLORIDE SERPL-SCNC: 110 MMOL/L (ref 101–110)
CO2 SERPL-SCNC: 25 MMOL/L (ref 21–32)
CREAT SERPL-MCNC: 0.97 MG/DL (ref 0.8–1.5)
DIFFERENTIAL METHOD BLD: ABNORMAL
EOSINOPHIL # BLD: 0.2 K/UL (ref 0–0.8)
EOSINOPHIL NFR BLD: 3 % (ref 0.5–7.8)
ERYTHROCYTE [DISTWIDTH] IN BLOOD BY AUTOMATED COUNT: 13 % (ref 11.9–14.6)
GLUCOSE SERPL-MCNC: 130 MG/DL (ref 65–100)
HCT VFR BLD AUTO: 39.2 % (ref 41.1–50.3)
HGB BLD-MCNC: 13.2 G/DL (ref 13.6–17.2)
IMM GRANULOCYTES # BLD AUTO: 0 K/UL (ref 0–0.5)
IMM GRANULOCYTES NFR BLD AUTO: 0 % (ref 0–5)
LYMPHOCYTES # BLD: 1 K/UL (ref 0.5–4.6)
LYMPHOCYTES NFR BLD: 14 % (ref 13–44)
MCH RBC QN AUTO: 30.6 PG (ref 26.1–32.9)
MCHC RBC AUTO-ENTMCNC: 33.7 G/DL (ref 31.4–35)
MCV RBC AUTO: 91 FL (ref 82–102)
MM INDURATION, POC: 0 MM (ref 0–5)
MONOCYTES # BLD: 0.4 K/UL (ref 0.1–1.3)
MONOCYTES NFR BLD: 6 % (ref 4–12)
NEUTS SEG # BLD: 5.2 K/UL (ref 1.7–8.2)
NEUTS SEG NFR BLD: 76 % (ref 43–78)
NRBC # BLD: 0 K/UL (ref 0–0.2)
PLATELET # BLD AUTO: 161 K/UL (ref 150–450)
PMV BLD AUTO: 10 FL (ref 9.4–12.3)
POTASSIUM SERPL-SCNC: 4.2 MMOL/L (ref 3.5–5.1)
PPD, POC: NEGATIVE
RBC # BLD AUTO: 4.31 M/UL (ref 4.23–5.6)
SODIUM SERPL-SCNC: 140 MMOL/L (ref 133–143)
WBC # BLD AUTO: 6.9 K/UL (ref 4.3–11.1)

## 2023-09-06 PROCEDURE — 36415 COLL VENOUS BLD VENIPUNCTURE: CPT

## 2023-09-06 PROCEDURE — 80048 BASIC METABOLIC PNL TOTAL CA: CPT

## 2023-09-06 PROCEDURE — 6370000000 HC RX 637 (ALT 250 FOR IP): Performed by: FAMILY MEDICINE

## 2023-09-06 PROCEDURE — 99234 HOSP IP/OBS SM DT SF/LOW 45: CPT | Performed by: NURSE PRACTITIONER

## 2023-09-06 PROCEDURE — 85025 COMPLETE CBC W/AUTO DIFF WBC: CPT

## 2023-09-06 PROCEDURE — 2500000003 HC RX 250 WO HCPCS: Performed by: INTERNAL MEDICINE

## 2023-09-06 PROCEDURE — 6370000000 HC RX 637 (ALT 250 FOR IP): Performed by: INTERNAL MEDICINE

## 2023-09-06 PROCEDURE — 2580000003 HC RX 258: Performed by: INTERNAL MEDICINE

## 2023-09-06 PROCEDURE — 2500000003 HC RX 250 WO HCPCS

## 2023-09-06 PROCEDURE — 2000000000 HC ICU R&B

## 2023-09-06 PROCEDURE — 2580000003 HC RX 258: Performed by: FAMILY MEDICINE

## 2023-09-06 PROCEDURE — 6360000002 HC RX W HCPCS: Performed by: FAMILY MEDICINE

## 2023-09-06 PROCEDURE — 2500000003 HC RX 250 WO HCPCS: Performed by: FAMILY MEDICINE

## 2023-09-06 PROCEDURE — 2580000003 HC RX 258

## 2023-09-06 RX ORDER — RISPERIDONE 1 MG/1
0.5 TABLET ORAL 2 TIMES DAILY
Status: DISCONTINUED | OUTPATIENT
Start: 2023-09-06 | End: 2023-09-06

## 2023-09-06 RX ORDER — HYDRALAZINE HYDROCHLORIDE 20 MG/ML
10 INJECTION INTRAMUSCULAR; INTRAVENOUS EVERY 4 HOURS PRN
Status: DISCONTINUED | OUTPATIENT
Start: 2023-09-06 | End: 2023-09-20 | Stop reason: HOSPADM

## 2023-09-06 RX ORDER — OLANZAPINE 5 MG/1
5 TABLET, ORALLY DISINTEGRATING ORAL 2 TIMES DAILY PRN
Status: DISCONTINUED | OUTPATIENT
Start: 2023-09-06 | End: 2023-09-07

## 2023-09-06 RX ORDER — HYDRALAZINE HYDROCHLORIDE 20 MG/ML
20 INJECTION INTRAMUSCULAR; INTRAVENOUS EVERY 4 HOURS PRN
Status: DISCONTINUED | OUTPATIENT
Start: 2023-09-06 | End: 2023-09-20 | Stop reason: HOSPADM

## 2023-09-06 RX ORDER — QUETIAPINE FUMARATE 25 MG/1
25 TABLET, FILM COATED ORAL 2 TIMES DAILY PRN
Status: DISCONTINUED | OUTPATIENT
Start: 2023-09-06 | End: 2023-09-09

## 2023-09-06 RX ORDER — QUETIAPINE FUMARATE 100 MG/1
100 TABLET, FILM COATED ORAL NIGHTLY
Status: DISCONTINUED | OUTPATIENT
Start: 2023-09-06 | End: 2023-09-10

## 2023-09-06 RX ORDER — DEXMEDETOMIDINE HYDROCHLORIDE 4 UG/ML
INJECTION, SOLUTION INTRAVENOUS
Status: COMPLETED
Start: 2023-09-06 | End: 2023-09-06

## 2023-09-06 RX ORDER — LANOLIN ALCOHOL/MO/W.PET/CERES
3 CREAM (GRAM) TOPICAL NIGHTLY
Status: DISCONTINUED | OUTPATIENT
Start: 2023-09-06 | End: 2023-09-20 | Stop reason: HOSPADM

## 2023-09-06 RX ORDER — QUETIAPINE FUMARATE 100 MG/1
50 TABLET, FILM COATED ORAL DAILY
Status: DISCONTINUED | OUTPATIENT
Start: 2023-09-06 | End: 2023-09-10

## 2023-09-06 RX ORDER — DEXMEDETOMIDINE HYDROCHLORIDE 4 UG/ML
.1-1.5 INJECTION, SOLUTION INTRAVENOUS CONTINUOUS
Status: DISCONTINUED | OUTPATIENT
Start: 2023-09-06 | End: 2023-09-11

## 2023-09-06 RX ADMIN — DEXMEDETOMIDINE 1.5 MCG/KG/HR: 100 INJECTION, SOLUTION INTRAVENOUS at 04:38

## 2023-09-06 RX ADMIN — OLANZAPINE 5 MG: 5 TABLET, ORALLY DISINTEGRATING ORAL at 21:45

## 2023-09-06 RX ADMIN — ENOXAPARIN SODIUM 40 MG: 100 INJECTION SUBCUTANEOUS at 09:17

## 2023-09-06 RX ADMIN — SODIUM CHLORIDE: 9 INJECTION, SOLUTION INTRAVENOUS at 11:32

## 2023-09-06 RX ADMIN — DEXMEDETOMIDINE 400 MCG: 100 INJECTION, SOLUTION INTRAVENOUS at 13:59

## 2023-09-06 RX ADMIN — WATER 5 MG: 1 INJECTION INTRAMUSCULAR; INTRAVENOUS; SUBCUTANEOUS at 14:31

## 2023-09-06 RX ADMIN — ASPIRIN 81 MG 81 MG: 81 TABLET ORAL at 09:15

## 2023-09-06 RX ADMIN — DEXMEDETOMIDINE 1.5 MCG/KG/HR: 100 INJECTION, SOLUTION INTRAVENOUS at 22:55

## 2023-09-06 RX ADMIN — QUETIAPINE FUMARATE 100 MG: 100 TABLET ORAL at 21:44

## 2023-09-06 RX ADMIN — SODIUM CHLORIDE: 9 INJECTION, SOLUTION INTRAVENOUS at 01:39

## 2023-09-06 RX ADMIN — DEXMEDETOMIDINE 0.8 MCG/KG/HR: 100 INJECTION, SOLUTION INTRAVENOUS at 18:42

## 2023-09-06 RX ADMIN — MEMANTINE 10 MG: 5 TABLET ORAL at 21:45

## 2023-09-06 RX ADMIN — DEXMEDETOMIDINE 1.2 MCG/KG/HR: 100 INJECTION, SOLUTION INTRAVENOUS at 20:14

## 2023-09-06 RX ADMIN — TUBERCULIN PURIFIED PROTEIN DERIVATIVE 5 UNITS: 5 INJECTION, SOLUTION INTRADERMAL at 14:32

## 2023-09-06 RX ADMIN — DEXMEDETOMIDINE 1.5 MCG/KG/HR: 100 INJECTION, SOLUTION INTRAVENOUS at 01:39

## 2023-09-06 RX ADMIN — Medication 6 MG: at 21:45

## 2023-09-06 RX ADMIN — DONEPEZIL HYDROCHLORIDE 10 MG: 5 TABLET, FILM COATED ORAL at 21:44

## 2023-09-06 RX ADMIN — DEXMEDETOMIDINE 1.5 MCG/KG/HR: 100 INJECTION, SOLUTION INTRAVENOUS at 10:37

## 2023-09-06 RX ADMIN — SODIUM CHLORIDE, PRESERVATIVE FREE 10 ML: 5 INJECTION INTRAVENOUS at 09:21

## 2023-09-06 RX ADMIN — DEXMEDETOMIDINE 1.5 MCG/KG/HR: 100 INJECTION, SOLUTION INTRAVENOUS at 07:56

## 2023-09-06 ASSESSMENT — PAIN SCALES - GENERAL
PAINLEVEL_OUTOF10: 0
PAINLEVEL_OUTOF10: 1
PAINLEVEL_OUTOF10: 0
PAINLEVEL_OUTOF10: 0

## 2023-09-06 NOTE — PROGRESS NOTES
Hospitalist Progress Note   Admit Date:  9/3/2023  8:00 PM   Name:  Aly Pete III   Age:  70 y.o. Sex:  male  :  1955   MRN:  [id number]   Room:  [facility]/    Presenting/Chief Complaint: No chief complaint on file. Reason(s) for Admission: Delirium [R41.0]  Hospital admission due to social situation [Z60.9]  Severe dementia with psychotic disturbance, unspecified dementia type ([address] [facility]) [F03. C2]  Acute psychosis [facility]) [address] Course:   Aly Pete III is a 70 y.o. male with medical history of early onset dementia, hypertension, diet-controlled diabetes admitted with worsening confusion and hallucination likely in the setting of progressive dementia. Patient became increasingly agitated and required antipsychotic medication and four-point restraints. Transferred to ICU for Precedex drip. Subjective & 24hr Events:   Pt sleeping, sedated on precedex. Unable to obtain history. Discussed and obtained history from nursing. Assessment & Plan:       Acute psychosis ([address] )  -try to wean off precedex today  -start seroquel 50mg qAM and 100mg qHS  -additional zyprexa and seroquel PRN  -Non-pharmacological intervention  Remove unnecessary lines and tubes   Reorient the patient throughout the day  Window open and lights on during the day. Lights off, television off, noises down during the night. If able, decrease nursing checks at night  Avoid restraints to the best of your ability   Avoid sensory deprivation by using glasses and hearing aids, if applicable     -Pharmacological intervention  Correct metabolic abnormalities  Limit benzodiazepines, antihistamines, narcotics, anticholinergics. Preference towards Precedex for sedation over fentanyl and benzodiazepines/GABAa agonists.    For persistent insomnia can use melatonin four hours prior to bedtime or Seroquel at bedtime       Benign prostatic hyperplasia with urinary hesitancy  -monitor for symptoms      HTN Chloride 109 101 - 110 mmol/L    CO2 27 21 - 32 mmol/L    Anion Gap 4 2 - 11 mmol/L    Glucose 129 (H) 65 - 100 mg/dL    BUN 25 (H) 8 - 23 MG/DL    Creatinine 0.91 0.8 - 1.5 MG/DL    Est, Glom Filt Rate >60 >60 ml/min/1.73m2    Calcium 9.5 8.3 - 10.4 MG/DL   CBC with Auto Differential    Collection Time: 09/05/23  3:15 AM   Result Value Ref Range    WBC 6.9 4.3 - 11.1 K/uL    RBC 4.45 4.23 - 5.6 M/uL    Hemoglobin 13.5 (L) 13.6 - 17.2 g/dL    Hematocrit 40.6 (L) 41.1 - 50.3 %    MCV 91.2 82.0 - 102.0 FL    MCH 30.3 26.1 - 32.9 PG    MCHC 33.3 31.4 - 35.0 g/dL    RDW 13.1 11.9 - 14.6 %    Platelets 992 (L) 164 - 450 K/uL    MPV 9.6 9.4 - 12.3 FL    nRBC 0.00 0.0 - 0.2 K/uL    Differential Type AUTOMATED      Neutrophils % 78 43 - 78 %    Lymphocytes % 13 13 - 44 %    Monocytes % 6 4.0 - 12.0 %    Eosinophils % 4 0.5 - 7.8 %    Basophils % 0 0.0 - 2.0 %    Immature Granulocytes 0 0.0 - 5.0 %    Neutrophils Absolute 5.4 1.7 - 8.2 K/UL    Lymphocytes Absolute 0.9 0.5 - 4.6 K/UL    Monocytes Absolute 0.4 0.1 - 1.3 K/UL    Eosinophils Absolute 0.2 0.0 - 0.8 K/UL    Basophils Absolute 0.0 0.0 - 0.2 K/UL    Absolute Immature Granulocyte 0.0 0.0 - 0.5 K/UL   POCT Glucose    Collection Time: 09/05/23  7:44 AM   Result Value Ref Range    POC Glucose 127 (H) 65 - 100 mg/dL    Performed by: Felecia    Vitamin B12    Collection Time: 09/05/23  1:41 PM   Result Value Ref Range    Vitamin B-12 657 193 - 986 pg/mL   Ammonia    Collection Time: 09/05/23  1:41 PM   Result Value Ref Range    Ammonia 29 24.9 - 68 UMOL/L   PLEASE READ & DOCUMENT PPD TEST IN 48 HRS    Collection Time: 09/05/23  7:00 PM   Result Value Ref Range    PPD, (POC) Negative Negative    mm Induration 0 0 - 5 mm   Basic Metabolic Panel w/ Reflex to MG    Collection Time: 09/06/23  3:08 AM   Result Value Ref Range    Sodium 140 133 - 143 mmol/L    Potassium 4.2 3.5 - 5.1 mmol/L    Chloride 110 101 - 110 mmol/L    CO2 25 21 - 32 mmol/L    Anion Gap 5 2 - 11 (ZOLOFT) tablet 150 mg  150 mg Oral Daily    atorvastatin (LIPITOR) tablet 10 mg  10 mg Oral Daily    glucose chewable tablet 16 g  4 tablet Oral PRN    dextrose bolus 10% 125 mL  125 mL IntraVENous PRN    Or    dextrose bolus 10% 250 mL  250 mL IntraVENous PRN    Glucagon Emergency KIT 1 mg  1 mg SubCUTAneous PRN    dextrose 10 % infusion   IntraVENous Continuous PRN    0.9 % sodium chloride infusion   IntraVENous Continuous    sodium chloride flush 0.9 % injection 5-40 mL  5-40 mL IntraVENous 2 times per day    sodium chloride flush 0.9 % injection 5-40 mL  5-40 mL IntraVENous PRN    0.9 % sodium chloride infusion   IntraVENous PRN    potassium chloride (KLOR-CON M) extended release tablet 40 mEq  40 mEq Oral PRN    Or    potassium bicarb-citric acid (EFFER-K) effervescent tablet 40 mEq  40 mEq Oral PRN    Or    potassium chloride 10 mEq/100 mL IVPB (Peripheral Line)  10 mEq IntraVENous PRN    magnesium sulfate 2000 mg in 50 mL IVPB premix  2,000 mg IntraVENous PRN    enoxaparin (LOVENOX) injection 40 mg  40 mg SubCUTAneous Daily    promethazine (PHENERGAN) tablet 12.5 mg  12.5 mg Oral Q6H PRN    Or    ondansetron (ZOFRAN) injection 4 mg  4 mg IntraVENous Q6H PRN    polyethylene glycol (GLYCOLAX) packet 17 g  17 g Oral Daily    bisacodyl (DULCOLAX) EC tablet 5 mg  5 mg Oral Daily PRN    aluminum & magnesium hydroxide-simethicone (MAALOX) 200-200-20 MG/5ML suspension 30 mL  30 mL Oral Q6H PRN    acetaminophen (TYLENOL) tablet 650 mg  650 mg Oral Q6H PRN    Or    acetaminophen (TYLENOL) suppository 650 mg  650 mg Rectal Q6H PRN    melatonin tablet 3 mg  3 mg Oral Nightly PRN       Signed:  Gertrude Stephens MD    Part of this note may have been written by using a voice dictation software. The note has been proof read but may still contain some grammatical/other typographical errors.

## 2023-09-06 NOTE — PROGRESS NOTES
Pt was getting increasingly agitated and combative despite being maxed out on precedex. Per day shift nurse, he got agitated for her and improved greatly after voiding 800ml. Pt does state that he needs to use the bathroom but is too agitated to allow out of restraints/bed. He will not use condom cath or urinal. Spoke with Dr. Kenji Astorga and received order to place Navarro catheter and give Geodon. Navarro placed with immediate return of 750 ml of clear yellow urine. Pt much calmer but still getting agitated easily so Geodon was also given. Will continue to monitor.

## 2023-09-06 NOTE — PROGRESS NOTES
PT held therapy activity today per RN request, due to pt recently being too agitated while being in restraints & requiring heavy sedation to calm him. Therapy will follow up 9/7/23 as indicated.   Pipo Pain, PT, LORRAINE

## 2023-09-06 NOTE — CARE COORDINATION
Discharge planning was discussed with the primary nurse and physical therapist. The patient is not yet medically ready for discharge. He is pending therapy evaluations and a psychiatry consult.  Discharge planning is pending his clinical course in the hospital.

## 2023-09-06 NOTE — PLAN OF CARE
Problem: Discharge Planning  Goal: Discharge to home or other facility with appropriate resources  Outcome: Progressing  Flowsheets (Taken 9/6/2023 0730)  Discharge to home or other facility with appropriate resources:   Identify barriers to discharge with patient and caregiver   Arrange for needed discharge resources and transportation as appropriate   Identify discharge learning needs (meds, wound care, etc)   Refer to discharge planning if patient needs post-hospital services based on physician order or complex needs related to functional status, cognitive ability or social support system     Problem: Pain  Goal: Verbalizes/displays adequate comfort level or baseline comfort level  Outcome: Progressing  Flowsheets (Taken 9/6/2023 0745)  Verbalizes/displays adequate comfort level or baseline comfort level:   Encourage patient to monitor pain and request assistance   Assess pain using appropriate pain scale   Administer analgesics based on type and severity of pain and evaluate response   Implement non-pharmacological measures as appropriate and evaluate response   Consider cultural and social influences on pain and pain management   Notify Licensed Independent Practitioner if interventions unsuccessful or patient reports new pain     Problem: Safety - Adult  Goal: Free from fall injury  Outcome: Progressing     Problem: Safety - Medical Restraint  Goal: Remains free of injury from restraints (Restraint for Interference with Medical Device)  Description: INTERVENTIONS:  1. Determine that other, less restrictive measures have been tried or would not be effective before applying the restraint  2. Evaluate the patient's condition at the time of restraint application  3. Inform patient/family regarding the reason for restraint  4.  Q2H: Monitor safety, psychosocial status, comfort, nutrition and hydration  Outcome: Progressing  Flowsheets (Taken 9/6/2023 0800)  Remains free of injury from restraints (restraint for initiate progressive release per policy  Outcome: Progressing     Problem: Skin/Tissue Integrity  Goal: Absence of new skin breakdown  Description: 1. Monitor for areas of redness and/or skin breakdown  2. Assess vascular access sites hourly  3. Every 4-6 hours minimum:  Change oxygen saturation probe site  4. Every 4-6 hours:  If on nasal continuous positive airway pressure, respiratory therapy assess nares and determine need for appliance change or resting period.   Outcome: Progressing     Problem: Chronic Conditions and Co-morbidities  Goal: Patient's chronic conditions and co-morbidity symptoms are monitored and maintained or improved  Outcome: Progressing  Flowsheets (Taken 9/6/2023 5748)  Care Plan - Patient's Chronic Conditions and Co-Morbidity Symptoms are Monitored and Maintained or Improved:   Monitor and assess patient's chronic conditions and comorbid symptoms for stability, deterioration, or improvement   Collaborate with multidisciplinary team to address chronic and comorbid conditions and prevent exacerbation or deterioration

## 2023-09-06 NOTE — CONSULTS
For student NP documentation I have personally evaluated this patient and have completed at least one if not all key elements of the E/M (history, behavioral health evaluation and decision making). Additional findings are as noted. Agree with assessment and plan:    Plan:  70 y.o. male with a history significant for anxiety, depression, dementia, dementia without behavioral disturbance, mild vascular dementia without behavioral disturbance, cognitive communication deficit, confusion with hallucinations. Pt has a PMH that includes:   sick sinus syndrome, HTN, GERD, pacemaker, BPH, hyperlipidemia, DJD, NIDDM  Unable to evaluate pt - r/t pt sedated/asleep - Precedex / chart review completed / other information and hx obtained from pt family  -Recommend adding geriatrician referral to [facility].  -Recommend referral to outpatient psychiatry for cognitive, depression, and anxiety management.  -Continue with neurology, has November appt per sister - consider moving to sooner follow-up appt if possible.  -Eliminate polypharmacy and minimize medications as much as possible   -Recommend weaning Precedex as tolerates, could add Depakote 125 mg-250 mg BID as tolerates with the option to increase to 500 mg BID - to target unstable mood - monitor for - drowsiness, GI upset, tremors, tiredness, low platelet count, constipation, mood swings, vision changes, agitation, involuntary eye movements  -Monitor for sedation/adverse effects, LFTs, check Depakote levels in 3-5 days.  -Continue Seroquel 100 mg PO HS, Seroquel 50 mg PO QD.  -Could consider discontinuing Seroquel 25mg BID PRN,  Zyprexa 5 mg PO twice a day PRN. -Haldol 2 mg IM PRN  acute agitation only   -Pt does NOT meet criteria for IVC at this time.  -Discussed with sisters Ruth Margy / Brandyn questions answered, both agreeable to plan.   -Discussed with MD and CM = in agreement with plan    Ensure consistency, communicate clearly and concisely, Depakote 125 mg-250 mg BID as tolerates with the option to increase to 500 mg BID - to target unstable mood - monitor for - drowsiness, GI upset, tremors, tiredness, low platelet count, constipation, mood swings, vision changes, agitation, involuntary eye movements  -Monitor for sedation/adverse effects, LFTs, check Depakote levels in 3-5 days.  -Continue Seroquel 100 mg PO HS, Seroquel 50 mg PO QD.  -Could consider discontinuing Seroquel 25mg BID PRN,  Zyprexa 5 mg PO twice a day PRN. -Haldol 2 mg IM PRN  acute agitation only   -Pt does NOT meet criteria for IVC at this time.  -Discussed with sisters Ian Burns / Brandyn questions answered, both agreeable to plan. -Discussed with MD and CM = in agreement with plan    Ensure consistency, communicate clearly and concisely, giving repeated verbal reminders of day, time, location and identity of care team, reorienting regularly, ensure adequate lighting during the day, control sources of excess noise, arrange treatments to allow for maximum periods of uninterrupted sleep, encourage self-care and participation in treatment  If possible, avoid use of opioids, benzodiazepines, and anticholinergic medications as these can worsen delirium / cognition impairment. Encourage STR  - physical dysfunction can be a risk factor, symptom and functional consequence of delirium / therefore, structured and repetitive mobilization (ie PT/OT) can be important in helping to improve cognitive impairment (specifically executive functioning and verbal fluency)    Time Spent = 45 minutes      Rama Soliz PMHNP-BC  9/6/2023  [address] [facility]

## 2023-09-06 NOTE — CARE COORDINATION
1342: No visitors are at the bedside. RN CM left a list of skilled nursing facilities and their quality metrics at the bedside. 1551: RN CM spoke with his sister Ronnell Snellen over the telephone and discussed discharge planning. RN CM discussed the recommendation for short term rehabilitation. She stated she wants to discuss it with the family first. She stated she has been speaking with some assisted living facilities and is hopeful the patient will not be medically ready for discharge for a few more days. RN CM discussed discharging planning options including home with home health, hiring private caregivers, short term rehabilitation, and assisted living facilities. RN CM encouraged her to continue working with family for long term planning. She stated it would be difficult for the family to care for him at home. RN CM encouraged her to ask questions. She requested to speak with the attending MD. He was notified of her request. Case management will continue to follow this patient for discharge planning needs.

## 2023-09-07 PROBLEM — F01.C0 SEVERE VASCULAR DEMENTIA (HCC): Chronic | Status: ACTIVE | Noted: 2022-04-05

## 2023-09-07 PROBLEM — R41.0 DELIRIUM: Status: ACTIVE | Noted: 2023-09-07

## 2023-09-07 PROBLEM — F03.C2 SEVERE DEMENTIA WITH PSYCHOTIC DISTURBANCE (HCC): Chronic | Status: ACTIVE | Noted: 2023-09-06

## 2023-09-07 PROBLEM — R41.89 COGNITIVE IMPAIRMENT: Chronic | Status: ACTIVE | Noted: 2023-09-06

## 2023-09-07 LAB
MM INDURATION, POC: 0 MM (ref 0–5)
PPD, POC: NEGATIVE

## 2023-09-07 PROCEDURE — 2580000003 HC RX 258: Performed by: INTERNAL MEDICINE

## 2023-09-07 PROCEDURE — 6370000000 HC RX 637 (ALT 250 FOR IP): Performed by: FAMILY MEDICINE

## 2023-09-07 PROCEDURE — 6370000000 HC RX 637 (ALT 250 FOR IP): Performed by: INTERNAL MEDICINE

## 2023-09-07 PROCEDURE — 2500000003 HC RX 250 WO HCPCS: Performed by: INTERNAL MEDICINE

## 2023-09-07 PROCEDURE — 97530 THERAPEUTIC ACTIVITIES: CPT

## 2023-09-07 PROCEDURE — 97535 SELF CARE MNGMENT TRAINING: CPT

## 2023-09-07 PROCEDURE — 2580000003 HC RX 258: Performed by: FAMILY MEDICINE

## 2023-09-07 PROCEDURE — 6360000002 HC RX W HCPCS: Performed by: FAMILY MEDICINE

## 2023-09-07 PROCEDURE — 6360000002 HC RX W HCPCS: Performed by: INTERNAL MEDICINE

## 2023-09-07 PROCEDURE — 2000000000 HC ICU R&B

## 2023-09-07 RX ORDER — RISPERIDONE 1 MG/ML
0.5 SOLUTION ORAL 2 TIMES DAILY
Status: DISCONTINUED | OUTPATIENT
Start: 2023-09-07 | End: 2023-09-08

## 2023-09-07 RX ORDER — DIVALPROEX SODIUM 125 MG/1
250 CAPSULE, COATED PELLETS ORAL EVERY 12 HOURS SCHEDULED
Status: DISCONTINUED | OUTPATIENT
Start: 2023-09-07 | End: 2023-09-09

## 2023-09-07 RX ORDER — TAMSULOSIN HYDROCHLORIDE 0.4 MG/1
0.4 CAPSULE ORAL DAILY
Status: DISCONTINUED | OUTPATIENT
Start: 2023-09-07 | End: 2023-09-20 | Stop reason: HOSPADM

## 2023-09-07 RX ORDER — HALOPERIDOL 5 MG/ML
2 INJECTION INTRAMUSCULAR EVERY 6 HOURS
Status: DISCONTINUED | OUTPATIENT
Start: 2023-09-07 | End: 2023-09-10

## 2023-09-07 RX ADMIN — SERTRALINE 150 MG: 100 TABLET, FILM COATED ORAL at 08:19

## 2023-09-07 RX ADMIN — MEMANTINE 10 MG: 5 TABLET ORAL at 08:18

## 2023-09-07 RX ADMIN — ENOXAPARIN SODIUM 40 MG: 100 INJECTION SUBCUTANEOUS at 08:23

## 2023-09-07 RX ADMIN — SODIUM CHLORIDE, PRESERVATIVE FREE 10 ML: 5 INJECTION INTRAVENOUS at 20:36

## 2023-09-07 RX ADMIN — SODIUM CHLORIDE: 9 INJECTION, SOLUTION INTRAVENOUS at 02:19

## 2023-09-07 RX ADMIN — DEXMEDETOMIDINE 0.6 MCG/KG/HR: 100 INJECTION, SOLUTION INTRAVENOUS at 13:28

## 2023-09-07 RX ADMIN — TAMSULOSIN HYDROCHLORIDE 0.4 MG: 0.4 CAPSULE ORAL at 08:54

## 2023-09-07 RX ADMIN — DEXMEDETOMIDINE 1.5 MCG/KG/HR: 100 INJECTION, SOLUTION INTRAVENOUS at 02:06

## 2023-09-07 RX ADMIN — Medication 3 MG: at 20:35

## 2023-09-07 RX ADMIN — DONEPEZIL HYDROCHLORIDE 10 MG: 5 TABLET, FILM COATED ORAL at 20:35

## 2023-09-07 RX ADMIN — ATORVASTATIN CALCIUM 10 MG: 10 TABLET, FILM COATED ORAL at 08:18

## 2023-09-07 RX ADMIN — ASPIRIN 81 MG 81 MG: 81 TABLET ORAL at 08:18

## 2023-09-07 RX ADMIN — DEXMEDETOMIDINE 1 MCG/KG/HR: 100 INJECTION, SOLUTION INTRAVENOUS at 08:17

## 2023-09-07 RX ADMIN — HALOPERIDOL LACTATE 2 MG: 5 INJECTION, SOLUTION INTRAMUSCULAR at 13:31

## 2023-09-07 RX ADMIN — DIVALPROEX SODIUM 250 MG: 125 CAPSULE, COATED PELLETS ORAL at 08:25

## 2023-09-07 RX ADMIN — LOSARTAN POTASSIUM 50 MG: 50 TABLET, FILM COATED ORAL at 08:18

## 2023-09-07 RX ADMIN — DIVALPROEX SODIUM 250 MG: 125 CAPSULE, COATED PELLETS ORAL at 20:35

## 2023-09-07 RX ADMIN — RISPERIDONE 0.5 MG: 1 SOLUTION ORAL at 16:52

## 2023-09-07 RX ADMIN — HALOPERIDOL LACTATE 2 MG: 5 INJECTION, SOLUTION INTRAMUSCULAR at 08:27

## 2023-09-07 RX ADMIN — HYDRALAZINE HYDROCHLORIDE 20 MG: 20 INJECTION INTRAMUSCULAR; INTRAVENOUS at 03:13

## 2023-09-07 RX ADMIN — DEXMEDETOMIDINE 1.5 MCG/KG/HR: 100 INJECTION, SOLUTION INTRAVENOUS at 05:03

## 2023-09-07 RX ADMIN — DEXMEDETOMIDINE 0.6 MCG/KG/HR: 100 INJECTION, SOLUTION INTRAVENOUS at 22:35

## 2023-09-07 RX ADMIN — SODIUM CHLORIDE, PRESERVATIVE FREE 10 ML: 5 INJECTION INTRAVENOUS at 08:23

## 2023-09-07 RX ADMIN — MEMANTINE 10 MG: 5 TABLET ORAL at 20:35

## 2023-09-07 ASSESSMENT — PAIN SCALES - GENERAL: PAINLEVEL_OUTOF10: 0

## 2023-09-07 NOTE — CARE COORDINATION
RN CM met with the patient and his sisters at the bedside and discussed discharge planning. They are in agreement for the patient to discharge to short term rehabilitation and consented for case management to send a referral to all the facilities in [facility]. RN CM encouraged them to continue working together to find a long term care solution. RN CM discussed [facility], [facility], and [facility] with them. Referrals were sent and are pending review.

## 2023-09-07 NOTE — PROGRESS NOTES
Hospitalist Progress Note   Admit Date:  9/3/2023  8:00 PM   Name:  Silvestre Johnson III   Age:  70 y.o. Sex:  male  :  1955   MRN:  [id number]   Room:  [facility]    Presenting/Chief Complaint: No chief complaint on file. Reason(s) for Admission: Delirium [R41.0]  Hospital admission due to social situation [Z60.9]  Severe dementia with psychotic disturbance, unspecified dementia type ([address] [facility]) [F03. C2]  Acute psychosis [facility]) [address] Course:   Silvestre Johnson III is a 70 y.o. male with medical history of early onset dementia, hypertension, diet-controlled diabetes admitted with worsening confusion and hallucination likely in the setting of progressive dementia. Patient became increasingly agitated and required antipsychotic medication and four-point restraints. Transferred to ICU for Precedex drip. Subjective & 24hr Events:   Pt confused this morning. He is responsive but does not make sense. Discussed and obtained history from nursing. Assessment & Plan:       Acute psychosis with severe dementia ([address] [facility])  -refusing PO meds. Zyprexa did not work. Try haldol IM 2mg q6h. Try to transition to rispderal if he will take  -check CT head for stroke  -cardiac monitoring  -stop zyprexa; ineffective per nursing  -seroquel PRN  -Non-pharmacological intervention  Remove unnecessary lines and tubes   Reorient the patient throughout the day  Window open and lights on during the day. Lights off, television off, noises down during the night. If able, decrease nursing checks at night  Avoid restraints to the best of your ability   Avoid sensory deprivation by using glasses and hearing aids, if applicable     -Pharmacological intervention  Correct metabolic abnormalities  Limit benzodiazepines, antihistamines, narcotics, anticholinergics. Preference towards Precedex for sedation over fentanyl and benzodiazepines/GABAa agonists.    For persistent insomnia can use melatonin four hours prior to Value Ref Range    WBC 6.9 4.3 - 11.1 K/uL    RBC 4.31 4.23 - 5.6 M/uL    Hemoglobin 13.2 (L) 13.6 - 17.2 g/dL    Hematocrit 39.2 (L) 41.1 - 50.3 %    MCV 91.0 82.0 - 102.0 FL    MCH 30.6 26.1 - 32.9 PG    MCHC 33.7 31.4 - 35.0 g/dL    RDW 13.0 11.9 - 14.6 %    Platelets 305 310 - 295 K/uL    MPV 10.0 9.4 - 12.3 FL    nRBC 0.00 0.0 - 0.2 K/uL    Differential Type AUTOMATED      Neutrophils % 76 43 - 78 %    Lymphocytes % 14 13 - 44 %    Monocytes % 6 4.0 - 12.0 %    Eosinophils % 3 0.5 - 7.8 %    Basophils % 1 0.0 - 2.0 %    Immature Granulocytes 0 0.0 - 5.0 %    Neutrophils Absolute 5.2 1.7 - 8.2 K/UL    Lymphocytes Absolute 1.0 0.5 - 4.6 K/UL    Monocytes Absolute 0.4 0.1 - 1.3 K/UL    Eosinophils Absolute 0.2 0.0 - 0.8 K/UL    Basophils Absolute 0.0 0.0 - 0.2 K/UL    Absolute Immature Granulocyte 0.0 0.0 - 0.5 K/UL       Current Meds:  Current Facility-Administered Medications   Medication Dose Route Frequency    haloperidol lactate (HALDOL) injection 2 mg  2 mg IntraMUSCular Q6H    divalproex (DEPAKOTE SPRINKLE) DR capsule 250 mg  250 mg Oral 2 times per day    QUEtiapine (SEROQUEL) tablet 25 mg  25 mg Oral BID PRN    OLANZapine (ZYPREXA) 5 mg in sterile water 1 mL injection  5 mg IntraMUSCular Q12H PRN    OLANZapine zydis (ZYPREXA) disintegrating tablet 5 mg  5 mg Oral BID PRN    [Held by provider] QUEtiapine (SEROQUEL) tablet 50 mg  50 mg Oral Daily    [Held by provider] QUEtiapine (SEROQUEL) tablet 100 mg  100 mg Oral Nightly    hydrALAZINE (APRESOLINE) injection 10 mg  10 mg IntraVENous Q4H PRN    Or    hydrALAZINE (APRESOLINE) injection 20 mg  20 mg IntraVENous Q4H PRN    tuberculin injection 5 Units  5 Units IntraDERmal Once    melatonin tablet 3 mg  3 mg Oral Nightly    dexmedetomidine (PRECEDEX) 400 mcg in sodium chloride 0.9 % 100 mL infusion  0.1-1.5 mcg/kg/hr IntraVENous Continuous    aspirin chewable tablet 81 mg  81 mg Oral Daily    donepezil (ARICEPT) tablet 10 mg  10 mg Oral Nightly    memantine [facility]) tablet 10 mg  10 mg Oral BID    losartan (COZAAR) tablet 50 mg  50 mg Oral Daily    sertraline (ZOLOFT) tablet 150 mg  150 mg Oral Daily    atorvastatin (LIPITOR) tablet 10 mg  10 mg Oral Daily    glucose chewable tablet 16 g  4 tablet Oral PRN    dextrose bolus 10% 125 mL  125 mL IntraVENous PRN    Or    dextrose bolus 10% 250 mL  250 mL IntraVENous PRN    Glucagon Emergency KIT 1 mg  1 mg SubCUTAneous PRN    dextrose 10 % infusion   IntraVENous Continuous PRN    0.9 % sodium chloride infusion   IntraVENous Continuous    sodium chloride flush 0.9 % injection 5-40 mL  5-40 mL IntraVENous 2 times per day    sodium chloride flush 0.9 % injection 5-40 mL  5-40 mL IntraVENous PRN    0.9 % sodium chloride infusion   IntraVENous PRN    potassium chloride (KLOR-CON M) extended release tablet 40 mEq  40 mEq Oral PRN    Or    potassium bicarb-citric acid (EFFER-K) effervescent tablet 40 mEq  40 mEq Oral PRN    Or    potassium chloride 10 mEq/100 mL IVPB (Peripheral Line)  10 mEq IntraVENous PRN    magnesium sulfate 2000 mg in 50 mL IVPB premix  2,000 mg IntraVENous PRN    enoxaparin (LOVENOX) injection 40 mg  40 mg SubCUTAneous Daily    promethazine (PHENERGAN) tablet 12.5 mg  12.5 mg Oral Q6H PRN    Or    ondansetron (ZOFRAN) injection 4 mg  4 mg IntraVENous Q6H PRN    [Held by provider] polyethylene glycol (GLYCOLAX) packet 17 g  17 g Oral Daily    bisacodyl (DULCOLAX) EC tablet 5 mg  5 mg Oral Daily PRN    aluminum & magnesium hydroxide-simethicone (MAALOX) 200-200-20 MG/5ML suspension 30 mL  30 mL Oral Q6H PRN    acetaminophen (TYLENOL) tablet 650 mg  650 mg Oral Q6H PRN    Or    acetaminophen (TYLENOL) suppository 650 mg  650 mg Rectal Q6H PRN       Signed:  Romina Narvaez MD    Part of this note may have been written by using a voice dictation software. The note has been proof read but may still contain some grammatical/other typographical errors.

## 2023-09-07 NOTE — PROGRESS NOTES
ACUTE OCCUPATIONAL THERAPY GOALS:   (Developed with and agreed upon by patient and/or caregiver.)  1. Patient will perform grooming with min assist.  2. Patient will perform upper body dressing with min assist.  3. Patient will perform lower body dressing with min assist.  4. Patient will perform bathing with min assist.  5. Patient will perform toileting and toilet transfer with min assist.  6. Patient will perform ADL functional mobility and tranfers in room with min assist.  7. Patient to follow simple directions with mod verbal and tactile cues. Goals to be achieved in 7 days. OCCUPATIONAL THERAPY Daily Note and AM       OT Visit Days: 2  Acknowledge Orders  Time  OT Charge Capture  Rehab Caseload Tracker      Mary Aguilar is a 70 y.o. male   PRIMARY DIAGNOSIS: Acute psychosis ([address])  Delirium [R41.0]  Hospital admission due to social situation [Z60.9]  Severe dementia with psychotic disturbance, unspecified dementia type ([address]) [F03. C2]  Acute psychosis ([address]) [F23]       Reason for Referral: Other lack of cordination (R27.8)  Difficulty in walking, Not elsewhere classified (R26.2)  Other abnormalities of gait and mobility (R26.89)  Inpatient: Payor: MEDICARE / Plan: MEDICARE PART A AND B / Product Type: *No Product type* /     ASSESSMENT:     REHAB RECOMMENDATIONS:   Recommendation to date pending progress:  Setting:  Short-term Rehab    Equipment:    To Be Determined     ASSESSMENT:  Mr. Meg Gibson sitting up in recliner. Nursing had gotten him to the recliner. He was total assist for dressing,and bathing. He is talking but making gibberish but clear at times verbalizations. He was cooperative but needed hand held assist to participate. CNA reported he was unable to approximate his mouth to the straw today. He stood with min x 2 and took steps into the hallway and then felt shaky and returned to sit. He was set up in his recliner. Nursing was present.  He would benefit from skilled OT as he was first.    PROBLEM LIST:   (Skilled intervention is medically necessary to address:)  Decreased ADL/Functional Activities  Decreased Activity Tolerance  Decreased Balance  Decreased Cognition  Decreased Coordination  Decreased Gait Ability  Decreased Safety Awareness  Decreased Strength  Decreased Transfer Abilities   INTERVENTIONS PLANNED:  (Benefits and precautions of occupational therapy have been discussed with the patient.)  Self Care Training  Therapeutic Activity  Therapeutic Exercise/HEP  Neuromuscular Re-education  Education         TREATMENT:       TREATMENT:     Self Care: (15): Procedure(s) (per grid) utilized to improve and/or restore self-care/home management as related to  functional mobility/transfers in preparation for ADLs . Required moderate visual, verbal, manual, tactile, and   cueing to facilitate activities of daily living skills and compensatory activities.  15  TREATMENT GRID:  N/A    AFTER TREATMENT PRECAUTIONS: Bed/Chair Locked, Call light within reach, Chair, Needs within reach, Restraints , and RN at bedside    INTERDISCIPLINARY COLLABORATION:  RN/ PCT and PT/ PTA    EDUCATION:  Education Given To: Patient  Education Provided: Plan of Care  Education Method: Demonstration;Verbal  Barriers to Learning: Cognition  Education Outcome: Continued education needed    TOTAL TREATMENT DURATION AND TIME:  Time In: 1115  Time Out: 1130  Minutes: [address] [facility]

## 2023-09-07 NOTE — PLAN OF CARE
Problem: Discharge Planning  Goal: Discharge to home or other facility with appropriate resources  Outcome: Progressing  Flowsheets (Taken 9/7/2023 0657)  Discharge to home or other facility with appropriate resources:   Identify barriers to discharge with patient and caregiver   Arrange for needed discharge resources and transportation as appropriate   Identify discharge learning needs (meds, wound care, etc)   Refer to discharge planning if patient needs post-hospital services based on physician order or complex needs related to functional status, cognitive ability or social support system     Problem: Pain  Goal: Verbalizes/displays adequate comfort level or baseline comfort level  Outcome: Progressing     Problem: Safety - Adult  Goal: Free from fall injury  Outcome: Progressing     Problem: Safety - Medical Restraint  Goal: Remains free of injury from restraints (Restraint for Interference with Medical Device)  Description: INTERVENTIONS:  1. Determine that other, less restrictive measures have been tried or would not be effective before applying the restraint  2. Evaluate the patient's condition at the time of restraint application  3. Inform patient/family regarding the reason for restraint  4. Q2H: Monitor safety, psychosocial status, comfort, nutrition and hydration  Outcome: Progressing     Problem: ABCDS Injury Assessment  Goal: Absence of physical injury  Outcome: Progressing     Problem: Confusion  Goal: Confusion, delirium, dementia, or psychosis is improved or at baseline  Description: INTERVENTIONS:  1. Assess for possible contributors to thought disturbance, including medications, impaired vision or hearing, underlying metabolic abnormalities, dehydration, psychiatric diagnoses, and notify attending LIP  2. [place] high risk fall precautions, as indicated  3. Provide frequent short contacts to provide reality reorientation, refocusing and direction  4.  Decrease environmental stimuli, including noise as appropriate  5. Monitor and intervene to maintain adequate nutrition, hydration, elimination, sleep and activity  6. If unable to ensure safety without constant attention obtain sitter and review sitter guidelines with assigned personnel  7.  Initiate Psychosocial CNS and Spiritual Care consult, as indicated  Outcome: Progressing  Flowsheets (Taken 9/7/2023 0726)  Effect of thought disturbance (confusion, delirium, dementia, or psychosis) are managed with adequate functional status:   Assess for contributors to thought disturbance, including medications, impaired vision or hearing, underlying metabolic abnormalities, dehydration, psychiatric diagnoses, notify [address] [facility] high risk fall precautions, as indicated   Provide frequent short contacts to provide reality reorientation, refocusing and direction   Decrease environmental stimuli, including noise as appropriate   Monitor and intervene to maintain adequate nutrition, hydration, elimination, sleep and activity   If unable to ensure safety without constant attention obtain sitter and review sitter guidelines with assigned personnel   Initiate Psychosocial Clinical Nurse Specialist and [address] [place] [address] consult, as indicated     Problem: Neurosensory - Adult  Goal: Achieves stable or improved neurological status  Outcome: Progressing  Flowsheets (Taken 9/7/2023 0726)  Achieves stable or improved neurological status: Assess for and report changes in neurological status  Goal: Achieves maximal functionality and self care  Outcome: Progressing  Flowsheets (Taken 9/7/2023 0726)  Achieves maximal functionality and self care: Monitor swallowing and airway patency with patient fatigue and changes in neurological status     Problem: Skin/Tissue Integrity - Adult  Goal: Skin integrity remains intact  Outcome: Progressing  Flowsheets (Taken 9/7/2023 0726)  Skin Integrity Remains Intact: Monitor for areas of redness and/or skin breakdown     Problem: Musculoskeletal

## 2023-09-07 NOTE — FLOWSHEET NOTE
09/07/23 0655   Handoff   Communication Given Shift Handoff   Handoff Given To JERICHO Malik   Handoff Received From JERICHO Gutierrez   Handoff Communication Face to Face; At bedside   Time Handoff Given 0815   End of Shift Check Performed Yes

## 2023-09-08 ENCOUNTER — HOSPITAL ENCOUNTER (INPATIENT)
Dept: CT IMAGING | Age: 68
DRG: 884 | End: 2023-09-08
Payer: MEDICARE

## 2023-09-08 LAB
ALBUMIN SERPL-MCNC: 2.9 G/DL (ref 3.2–4.6)
ALBUMIN/GLOB SERPL: 1 (ref 0.4–1.6)
ALP SERPL-CCNC: 57 U/L (ref 50–136)
ALT SERPL-CCNC: 29 U/L (ref 12–65)
ANION GAP SERPL CALC-SCNC: 5 MMOL/L (ref 2–11)
AST SERPL-CCNC: 26 U/L (ref 15–37)
BILIRUB SERPL-MCNC: 0.8 MG/DL (ref 0.2–1.1)
BUN SERPL-MCNC: 15 MG/DL (ref 8–23)
CALCIUM SERPL-MCNC: 8.5 MG/DL (ref 8.3–10.4)
CHLORIDE SERPL-SCNC: 109 MMOL/L (ref 101–110)
CO2 SERPL-SCNC: 26 MMOL/L (ref 21–32)
CREAT SERPL-MCNC: 1.01 MG/DL (ref 0.8–1.5)
CRP SERPL-MCNC: 5.4 MG/DL (ref 0–0.9)
ERYTHROCYTE [DISTWIDTH] IN BLOOD BY AUTOMATED COUNT: 13.1 % (ref 11.9–14.6)
GLOBULIN SER CALC-MCNC: 3 G/DL (ref 2.8–4.5)
GLUCOSE SERPL-MCNC: 130 MG/DL (ref 65–100)
HCT VFR BLD AUTO: 35 % (ref 41.1–50.3)
HGB BLD-MCNC: 11.7 G/DL (ref 13.6–17.2)
MAGNESIUM SERPL-MCNC: 1.7 MG/DL (ref 1.8–2.4)
MCH RBC QN AUTO: 30.1 PG (ref 26.1–32.9)
MCHC RBC AUTO-ENTMCNC: 33.4 G/DL (ref 31.4–35)
MCV RBC AUTO: 90 FL (ref 82–102)
MM INDURATION, POC: 0 MM (ref 0–5)
NRBC # BLD: 0 K/UL (ref 0–0.2)
PLATELET # BLD AUTO: 165 K/UL (ref 150–450)
PMV BLD AUTO: 9.6 FL (ref 9.4–12.3)
POTASSIUM SERPL-SCNC: 3.5 MMOL/L (ref 3.5–5.1)
PPD, POC: NEGATIVE
PROT SERPL-MCNC: 5.9 G/DL (ref 6.3–8.2)
RBC # BLD AUTO: 3.89 M/UL (ref 4.23–5.6)
SODIUM SERPL-SCNC: 140 MMOL/L (ref 133–143)
WBC # BLD AUTO: 7.5 K/UL (ref 4.3–11.1)

## 2023-09-08 PROCEDURE — 2580000003 HC RX 258: Performed by: INTERNAL MEDICINE

## 2023-09-08 PROCEDURE — 97530 THERAPEUTIC ACTIVITIES: CPT

## 2023-09-08 PROCEDURE — 2000000000 HC ICU R&B

## 2023-09-08 PROCEDURE — 80053 COMPREHEN METABOLIC PANEL: CPT

## 2023-09-08 PROCEDURE — 2500000003 HC RX 250 WO HCPCS: Performed by: INTERNAL MEDICINE

## 2023-09-08 PROCEDURE — 86140 C-REACTIVE PROTEIN: CPT

## 2023-09-08 PROCEDURE — 36415 COLL VENOUS BLD VENIPUNCTURE: CPT

## 2023-09-08 PROCEDURE — 70450 CT HEAD/BRAIN W/O DYE: CPT

## 2023-09-08 PROCEDURE — 83735 ASSAY OF MAGNESIUM: CPT

## 2023-09-08 PROCEDURE — 6370000000 HC RX 637 (ALT 250 FOR IP): Performed by: FAMILY MEDICINE

## 2023-09-08 PROCEDURE — 6370000000 HC RX 637 (ALT 250 FOR IP): Performed by: INTERNAL MEDICINE

## 2023-09-08 PROCEDURE — 6360000002 HC RX W HCPCS: Performed by: FAMILY MEDICINE

## 2023-09-08 PROCEDURE — 2580000003 HC RX 258: Performed by: FAMILY MEDICINE

## 2023-09-08 PROCEDURE — 6360000002 HC RX W HCPCS: Performed by: INTERNAL MEDICINE

## 2023-09-08 PROCEDURE — 85027 COMPLETE CBC AUTOMATED: CPT

## 2023-09-08 PROCEDURE — 97535 SELF CARE MNGMENT TRAINING: CPT

## 2023-09-08 RX ORDER — MAGNESIUM SULFATE 1 G/100ML
1000 INJECTION INTRAVENOUS ONCE
Status: COMPLETED | OUTPATIENT
Start: 2023-09-08 | End: 2023-09-08

## 2023-09-08 RX ORDER — LORAZEPAM 2 MG/ML
2 INJECTION INTRAMUSCULAR
Status: DISCONTINUED | OUTPATIENT
Start: 2023-09-08 | End: 2023-09-10

## 2023-09-08 RX ORDER — RISPERIDONE 1 MG/ML
1 SOLUTION ORAL 2 TIMES DAILY
Status: DISCONTINUED | OUTPATIENT
Start: 2023-09-08 | End: 2023-09-08

## 2023-09-08 RX ORDER — RISPERIDONE 1 MG/ML
1 SOLUTION ORAL 2 TIMES DAILY
Status: DISCONTINUED | OUTPATIENT
Start: 2023-09-08 | End: 2023-09-09

## 2023-09-08 RX ADMIN — SERTRALINE 150 MG: 100 TABLET, FILM COATED ORAL at 10:14

## 2023-09-08 RX ADMIN — ASPIRIN 81 MG 81 MG: 81 TABLET ORAL at 10:14

## 2023-09-08 RX ADMIN — MEMANTINE 10 MG: 5 TABLET ORAL at 20:39

## 2023-09-08 RX ADMIN — DEXMEDETOMIDINE 0.6 MCG/KG/HR: 100 INJECTION, SOLUTION INTRAVENOUS at 16:36

## 2023-09-08 RX ADMIN — DIVALPROEX SODIUM 250 MG: 125 CAPSULE, COATED PELLETS ORAL at 10:14

## 2023-09-08 RX ADMIN — ATORVASTATIN CALCIUM 10 MG: 10 TABLET, FILM COATED ORAL at 10:14

## 2023-09-08 RX ADMIN — TAMSULOSIN HYDROCHLORIDE 0.4 MG: 0.4 CAPSULE ORAL at 10:14

## 2023-09-08 RX ADMIN — SODIUM CHLORIDE: 9 INJECTION, SOLUTION INTRAVENOUS at 20:45

## 2023-09-08 RX ADMIN — Medication 3 MG: at 20:39

## 2023-09-08 RX ADMIN — SODIUM CHLORIDE, PRESERVATIVE FREE 10 ML: 5 INJECTION INTRAVENOUS at 20:39

## 2023-09-08 RX ADMIN — ENOXAPARIN SODIUM 40 MG: 100 INJECTION SUBCUTANEOUS at 10:19

## 2023-09-08 RX ADMIN — DONEPEZIL HYDROCHLORIDE 10 MG: 5 TABLET, FILM COATED ORAL at 20:39

## 2023-09-08 RX ADMIN — MAGNESIUM SULFATE HEPTAHYDRATE 1000 MG: 1 INJECTION, SOLUTION INTRAVENOUS at 12:43

## 2023-09-08 RX ADMIN — DEXMEDETOMIDINE 0.6 MCG/KG/HR: 100 INJECTION, SOLUTION INTRAVENOUS at 06:42

## 2023-09-08 RX ADMIN — MEMANTINE 10 MG: 5 TABLET ORAL at 10:14

## 2023-09-08 RX ADMIN — DIVALPROEX SODIUM 250 MG: 125 CAPSULE, COATED PELLETS ORAL at 20:39

## 2023-09-08 RX ADMIN — RISPERIDONE 1 MG: 1 SOLUTION ORAL at 10:17

## 2023-09-08 RX ADMIN — LOSARTAN POTASSIUM 50 MG: 50 TABLET, FILM COATED ORAL at 10:14

## 2023-09-08 NOTE — PROGRESS NOTES
ACUTE OCCUPATIONAL THERAPY GOALS:   (Developed with and agreed upon by patient and/or caregiver.)  1. Patient will perform grooming with min assist.  2. Patient will perform upper body dressing with min assist.  3. Patient will perform lower body dressing with min assist.  4. Patient will perform bathing with min assist.  5. Patient will perform toileting and toilet transfer with min assist.  6. Patient will perform ADL functional mobility and tranfers in room with min assist.  7. Patient to follow simple directions with mod verbal and tactile cues. Goals to be achieved in 7 days. OCCUPATIONAL THERAPY Daily Note and AM       OT Visit Days: 3  Acknowledge Orders  Time  OT Charge Capture  Rehab Caseload Tracker      Molina Bullard is a 70 y.o. male   PRIMARY DIAGNOSIS: Acute psychosis ([address])  Delirium [R41.0]  Hospital admission due to social situation [Z60.9]  Severe dementia with psychotic disturbance, unspecified dementia type ([address]) [F03. C2]  Acute psychosis ([address]) [F23]       Reason for Referral: Other lack of cordination (R27.8)  Difficulty in walking, Not elsewhere classified (R26.2)  Other abnormalities of gait and mobility (R26.89)  Inpatient: Payor: MEDICARE / Plan: MEDICARE PART A AND B / Product Type: *No Product type* /     ASSESSMENT:     REHAB RECOMMENDATIONS:   Recommendation to date pending progress:  Setting:  Short-term Rehab    Equipment:    To Be Determined     ASSESSMENT:  Mr. Bertram Klinefelter sitting up in recliner. Nursing had gotten him to the recliner. He was total assist for dressing,and bathing. He is talking but making gibberish but clear at times verbalizations. He was cooperative but needed hand held assist to participate. CNA reported he was unable to approximate his mouth to the straw today. He stood with min x 2 and took steps into the hallway and then felt shaky and returned to sit. He was set up in his recliner. Nursing was present.  He would benefit from skilled OT as he was needed    TOTAL TREATMENT DURATION AND TIME:  Time In: 1330  Time Out: [address]  Minutes: 25    Artur Little, OT

## 2023-09-08 NOTE — CARE COORDINATION
No family was at the bedside. The patient received a bed offer at [facility]. A list of the patient's bed offers were left at the bedside. RN CM called the patient's sister Angela Echeverria at [phone] but was unable to reach her by telephone.

## 2023-09-08 NOTE — CARE COORDINATION
Discharge planning was discussed with the Critical Care Interdisciplinary Team. He is receiving IV treatments and is not yet medically ready for discharge. Referrals for short term rehabilitation were sent yesterday. Case Management will continue to follow this patient for discharge planning needs.

## 2023-09-08 NOTE — PROGRESS NOTES
ACUTE PHYSICAL THERAPY GOALS:   (Developed with and agreed upon by patient and/or caregiver.)  STG:  (1.)Mr. Augusto May will move from supine to sit and sit to supine  with MINIMAL ASSIST within 4-7 treatment day(s). -GOAL MET 9/8/23     (2.)Mr. Augusto May will transfer from bed to chair and chair to bed with MINIMAL ASSIST using the least restrictive device within 4-7 treatment day(s). (3.)Mr. Augusto May will ambulate with MINIMAL ASSIST for 75 feet with the least restrictive device within 4-7 treatment day(s). _______________________________________________________________________________________________     PHYSICAL THERAPY Daily Note and PM  (Link to Caseload Tracking: PT Visit Days : 3  Acknowledge Orders  Time In/Out  PT Charge Capture  Rehab Caseload Tracker    Kathye Saint III is a 70 y.o. male   PRIMARY DIAGNOSIS: Acute psychosis ([address])  Delirium [R41.0]  Hospital admission due to social situation [Z60.9]  Severe dementia with psychotic disturbance, unspecified dementia type ([address]) [F03. C2]  Acute psychosis ([address]) [F23]       Reason for Referral: Generalized Muscle Weakness (M62.81)  Difficulty in walking, Not elsewhere classified (R26.2)  Inpatient: Payor: MEDICARE / Plan: MEDICARE PART A AND B / Product Type: *No Product type* /     ASSESSMENT:     REHAB RECOMMENDATIONS:   Recommendation to date pending progress:  Setting:  Short-term Rehab    Equipment:    To Be Determined     ASSESSMENT:  Mr. Augusto May lives at home alone and prior to this admission into the ICU he was independent at home without assistive devices for ambulation and ADLs. He has a history of early onset dementia and is admitted with worsening confusion and hallucination. He is pending a psych eval and RN reached out to therapy as it was a good time for assessment, RN and CNA had helped pt get to the chair. On assessment pt presents with decreased balance, decreased coordination, decreased safety awareness, decreased mobility.  Pt will with a railing?: A Lot  AM-PAC Inpatient Mobility Raw Score : 14  AM-PAC Inpatient T-Scale Score : 38.1  Mobility Inpatient CMS 0-100% Score: 61.29  Mobility Inpatient CMS G-Code Modifier : CL    SUBJECTIVE:   Mr. Nancy Erwin lethargic but agreeable to therapy. Needed cuing to keep eyes open during therapy at times. Still confused but no agitation with therapy session today.     Social/Functional Lives With: Alone  Type of Home: House  Bathroom Equipment: None  Home Equipment: None  ADL Assistance: Independent    OBJECTIVE:     PAIN: Tari Espinozae / O2: Fior Dumont / Angie Jorge / Virgilio Caller:   Pre Treatment: pt does not appear to be in any pain         Post Treatment: pt does not appear to be in any pain Vitals        Oxygen on room air      IV and Telemetry     RESTRICTIONS/PRECAUTIONS:   fall                 GROSS EVALUATION:  Intact Impaired (Comments):   AROM [x]     PROM []    Strength [x]  However incoordinated   Balance []  poor   Posture [] Forward Head  Rounded Shoulders   Sensation [x]     Coordination []   Generally decreased all extremities   Tone [x]     Edema []    Activity Tolerance []  Limited by fatigue and weakness    []      COGNITION/  PERCEPTION: Intact Impaired (Comments):   Orientation []  Pt confused   Vision [x]     Hearing [x]     Cognition  []  Pt confused     MOBILITY: I Mod I S SBA CGA Min Mod Max Total  NT x2 Comments:   Bed Mobility    Rolling [] [] [] [] [x] [] [] [] [] [] []    Supine to Sit [] [] [] [] [x] [] [] [] [] [] [] Up in chair   Scooting [] [] [] [] [x] [] [] [] [] [] []    Sit to Supine [] [] [] [] [x] [] [] [] [] [] [] Stayed up in chair   Transfers    Sit to Stand [] [] [] [] [] [x] [] [] [] [] []    Bed to Chair [] [] [] [] [] [] [x] [] [] [] []    Stand to Sit [] [] [] [] [] [x] [] [] [] [] []     [] [] [] [] [] [] [] [] [] [] []    I=Independent, Mod I=Modified Independent, S=Supervision, SBA=Standby Assistance, CGA=Contact Guard Assistance,   Min=Minimal Assistance, Mod=Moderate [address] [facility] [address]: [address] [place] [address], PT

## 2023-09-09 LAB
MM INDURATION, POC: 0 MM (ref 0–5)
PPD, POC: NEGATIVE

## 2023-09-09 PROCEDURE — 6360000002 HC RX W HCPCS: Performed by: FAMILY MEDICINE

## 2023-09-09 PROCEDURE — 6370000000 HC RX 637 (ALT 250 FOR IP): Performed by: FAMILY MEDICINE

## 2023-09-09 PROCEDURE — 2580000003 HC RX 258: Performed by: INTERNAL MEDICINE

## 2023-09-09 PROCEDURE — 2580000003 HC RX 258: Performed by: FAMILY MEDICINE

## 2023-09-09 PROCEDURE — 2500000003 HC RX 250 WO HCPCS: Performed by: INTERNAL MEDICINE

## 2023-09-09 PROCEDURE — 6370000000 HC RX 637 (ALT 250 FOR IP): Performed by: INTERNAL MEDICINE

## 2023-09-09 PROCEDURE — 2000000000 HC ICU R&B

## 2023-09-09 RX ORDER — OLANZAPINE 5 MG/1
10 TABLET ORAL 2 TIMES DAILY
Status: DISCONTINUED | OUTPATIENT
Start: 2023-09-09 | End: 2023-09-11

## 2023-09-09 RX ORDER — HALOPERIDOL 5 MG/ML
2 INJECTION INTRAMUSCULAR EVERY 4 HOURS PRN
Status: DISCONTINUED | OUTPATIENT
Start: 2023-09-09 | End: 2023-09-20 | Stop reason: HOSPADM

## 2023-09-09 RX ORDER — DIVALPROEX SODIUM 125 MG/1
500 CAPSULE, COATED PELLETS ORAL EVERY 12 HOURS SCHEDULED
Status: DISCONTINUED | OUTPATIENT
Start: 2023-09-09 | End: 2023-09-11

## 2023-09-09 RX ORDER — CLONAZEPAM 0.5 MG/1
0.5 TABLET ORAL EVERY 12 HOURS
Status: DISCONTINUED | OUTPATIENT
Start: 2023-09-09 | End: 2023-09-11

## 2023-09-09 RX ADMIN — ACETAMINOPHEN 650 MG: 325 TABLET, FILM COATED ORAL at 20:25

## 2023-09-09 RX ADMIN — DIVALPROEX SODIUM 500 MG: 125 CAPSULE, COATED PELLETS ORAL at 20:30

## 2023-09-09 RX ADMIN — DIVALPROEX SODIUM 500 MG: 125 CAPSULE, COATED PELLETS ORAL at 13:01

## 2023-09-09 RX ADMIN — DEXMEDETOMIDINE 0.4 MCG/KG/HR: 100 INJECTION, SOLUTION INTRAVENOUS at 04:41

## 2023-09-09 RX ADMIN — Medication 3 MG: at 20:26

## 2023-09-09 RX ADMIN — OLANZAPINE 10 MG: 5 TABLET, FILM COATED ORAL at 12:57

## 2023-09-09 RX ADMIN — SODIUM CHLORIDE: 9 INJECTION, SOLUTION INTRAVENOUS at 17:22

## 2023-09-09 RX ADMIN — SERTRALINE 150 MG: 100 TABLET, FILM COATED ORAL at 12:56

## 2023-09-09 RX ADMIN — CLONAZEPAM 0.5 MG: 0.5 TABLET ORAL at 12:57

## 2023-09-09 RX ADMIN — OLANZAPINE 10 MG: 5 TABLET, FILM COATED ORAL at 20:31

## 2023-09-09 RX ADMIN — LOSARTAN POTASSIUM 50 MG: 50 TABLET, FILM COATED ORAL at 12:57

## 2023-09-09 RX ADMIN — TAMSULOSIN HYDROCHLORIDE 0.4 MG: 0.4 CAPSULE ORAL at 12:58

## 2023-09-09 RX ADMIN — DONEPEZIL HYDROCHLORIDE 10 MG: 5 TABLET, FILM COATED ORAL at 20:26

## 2023-09-09 RX ADMIN — MEMANTINE 10 MG: 5 TABLET ORAL at 12:56

## 2023-09-09 RX ADMIN — MEMANTINE 10 MG: 5 TABLET ORAL at 20:26

## 2023-09-09 RX ADMIN — ASPIRIN 81 MG 81 MG: 81 TABLET ORAL at 12:58

## 2023-09-09 RX ADMIN — CLONAZEPAM 0.5 MG: 0.5 TABLET ORAL at 20:31

## 2023-09-09 RX ADMIN — ATORVASTATIN CALCIUM 10 MG: 10 TABLET, FILM COATED ORAL at 12:58

## 2023-09-09 RX ADMIN — ZIPRASIDONE MESYLATE 10 MG: 20 INJECTION, POWDER, LYOPHILIZED, FOR SOLUTION INTRAMUSCULAR at 06:18

## 2023-09-09 RX ADMIN — SODIUM CHLORIDE, PRESERVATIVE FREE 10 ML: 5 INJECTION INTRAVENOUS at 20:31

## 2023-09-09 RX ADMIN — ENOXAPARIN SODIUM 40 MG: 100 INJECTION SUBCUTANEOUS at 10:03

## 2023-09-09 ASSESSMENT — PAIN SCALES - GENERAL
PAINLEVEL_OUTOF10: 0

## 2023-09-09 NOTE — PROGRESS NOTES
Patient became extremely agitated and aggressive with staff members. Pt threw his entire cup of ginger ale at staff member. MD was notified and orders received. Pt is being monitored continuously. Sitter at bedside.

## 2023-09-09 NOTE — PROGRESS NOTES
Based on my evaluation of this patient, I DO think this patient is \"INCAPACITATED. \"  I DO find that he lacks the ability to effectively receive, evaluate, comprehend, and respond to information and make or communicate decisions; and understand the consequences of said decisions.

## 2023-09-09 NOTE — PROGRESS NOTES
Patient resting, responding to voice, but not following commands sufficiently to swallow safely at this time. Precedex gtt titrated down. Will re-attempt PO medication administration at a later time.

## 2023-09-09 NOTE — PROGRESS NOTES
Hospitalist Progress Note   Admit Date:  9/3/2023  8:00 PM   Name:  Abida Abrams III   Age:  70 y.o. Sex:  male  :  1955   MRN:  [id number]   Room:  [facility]/    Presenting/Chief Complaint: No chief complaint on file. Reason(s) for Admission: Delirium [R41.0]  Hospital admission due to social situation [Z60.9]  Severe dementia with psychotic disturbance, unspecified dementia type ([address] [facility]) [F03. C2]  Acute psychosis [facility]) [address] Course:   Abida Abrams III is a 70 y.o. male with medical history of early onset dementia, hypertension, diet-controlled diabetes admitted with worsening confusion and hallucination likely in the setting of progressive dementia. Patient became increasingly agitated and required antipsychotic medication and four-point restraints. Transferred to ICU for Precedex drip. Lab workup and CT head no evidence of any abnormalities to explain behavior. Zyprexa was ineffective so this was stopped. Pt was started on seroquel and this also did not seem to work at 100mg doses. Depakote added. Seroquel switched to risperdal 9-8. Subjective & 24hr Events:   Pt got agitated again overnight. Required precedex again. Discussed with nursing and IDT      Assessment & Plan:       Acute psychosis with severe dementia ([address] [facility])  -change rispderdal to zyprexa  -increase depakote to 500mg BID  -add klonopin BID  -haldol IV PRN   -cardiac monitoring while getting antipsychotic agents    -Non-pharmacological intervention  Remove unnecessary lines and tubes   Reorient the patient throughout the day  Window open and lights on during the day. Lights off, television off, noises down during the night.  If able, decrease nursing checks at night  Avoid restraints to the best of your ability   Avoid sensory deprivation by using glasses and hearing aids, if applicable     -Pharmacological intervention  Correct metabolic abnormalities  Limit benzodiazepines, antihistamines, narcotics, [facility]) injection 4 mg  4 mg IntraVENous Q6H PRN    [Held by provider] polyethylene glycol (GLYCOLAX) packet 17 g  17 g Oral Daily    bisacodyl (DULCOLAX) EC tablet 5 mg  5 mg Oral Daily PRN    aluminum & magnesium hydroxide-simethicone (MAALOX) 200-200-20 MG/5ML suspension 30 mL  30 mL Oral Q6H PRN    acetaminophen (TYLENOL) tablet 650 mg  650 mg Oral Q6H PRN    Or    acetaminophen (TYLENOL) suppository 650 mg  650 mg Rectal Q6H PRN       Signed:  Singh Florez MD    Part of this note may have been written by using a voice dictation software. The note has been proof read but may still contain some grammatical/other typographical errors.

## 2023-09-09 NOTE — PROGRESS NOTES
PT attempted to see pt this pm, but despite vigorous rocking & passive movement of LE's , PT could not get pt to wake up & participate with therapy. RN made aware that pt was unable to arouse . PT will follow up as indicated.   Wang Castrejon, PT, LORRAINE

## 2023-09-09 NOTE — PLAN OF CARE
Problem: Safety - Adult  Goal: Free from fall injury  Outcome: Progressing     Problem: Safety - Medical Restraint  Goal: Remains free of injury from restraints (Restraint for Interference with Medical Device)  Description: INTERVENTIONS:  1. Determine that other, less restrictive measures have been tried or would not be effective before applying the restraint  2. Evaluate the patient's condition at the time of restraint application  3. Inform patient/family regarding the reason for restraint  4. Q2H: Monitor safety, psychosocial status, comfort, nutrition and hydration  Outcome: Progressing  Flowsheets  Taken 9/9/2023 0200  Remains free of injury from restraints (restraint for interference with medical device): Determine that other, less restrictive measures have been tried or would not be effective before applying the restraint  Taken 9/9/2023 0000  Remains free of injury from restraints (restraint for interference with medical device): Determine that other, less restrictive measures have been tried or would not be effective before applying the restraint  Taken 9/8/2023 2201  Remains free of injury from restraints (restraint for interference with medical device): Determine that other, less restrictive measures have been tried or would not be effective before applying the restraint     Problem: ABCDS Injury Assessment  Goal: Absence of physical injury  Outcome: Progressing     Problem: Confusion  Goal: Confusion, delirium, dementia, or psychosis is improved or at baseline  Description: INTERVENTIONS:  1. Assess for possible contributors to thought disturbance, including medications, impaired vision or hearing, underlying metabolic abnormalities, dehydration, psychiatric diagnoses, and notify attending LIP  2. [place] high risk fall precautions, as indicated  3. Provide frequent short contacts to provide reality reorientation, refocusing and direction  4.  Decrease environmental stimuli, including noise as appropriate  5. Monitor and intervene to maintain adequate nutrition, hydration, elimination, sleep and activity  6. If unable to ensure safety without constant attention obtain sitter and review sitter guidelines with assigned personnel  7.  Initiate Psychosocial CNS and Spiritual Care consult, as indicated  Outcome: Progressing  Flowsheets (Taken 9/8/2023 1901)  Effect of thought disturbance (confusion, delirium, dementia, or psychosis) are managed with adequate functional status: Assess for contributors to thought disturbance, including medications, impaired vision or hearing, underlying metabolic abnormalities, dehydration, psychiatric diagnoses, notify LIP     Problem: Neurosensory - Adult  Goal: Achieves stable or improved neurological status  Outcome: Progressing  Flowsheets (Taken 9/8/2023 1901)  Achieves stable or improved neurological status: Assess for and report changes in neurological status  Goal: Achieves maximal functionality and self care  Outcome: Progressing  Flowsheets (Taken 9/8/2023 1901)  Achieves maximal functionality and self care: Monitor swallowing and airway patency with patient fatigue and changes in neurological status     Problem: Skin/Tissue Integrity - Adult  Goal: Skin integrity remains intact  Outcome: Progressing  Flowsheets (Taken 9/8/2023 1901)  Skin Integrity Remains Intact: Monitor for areas of redness and/or skin breakdown     Problem: Musculoskeletal - Adult  Goal: Return mobility to safest level of function  Outcome: Progressing  Flowsheets (Taken 9/8/2023 1901)  Return Mobility to Safest Level of Function: Assess patient stability and activity tolerance for standing, transferring and ambulating with or without assistive devices     Problem: Gastrointestinal - Adult  Goal: Maintains adequate nutritional intake  Outcome: Progressing  Flowsheets (Taken 9/8/2023 1901)  Maintains adequate nutritional intake: Monitor percentage of each meal consumed     Problem: Genitourinary -

## 2023-09-10 ENCOUNTER — APPOINTMENT (OUTPATIENT)
Dept: GENERAL RADIOLOGY | Age: 68
DRG: 884 | End: 2023-09-10
Payer: MEDICARE

## 2023-09-10 LAB
ANION GAP SERPL CALC-SCNC: 6 MMOL/L (ref 2–11)
BASOPHILS # BLD: 0 K/UL (ref 0–0.2)
BASOPHILS NFR BLD: 1 % (ref 0–2)
BUN SERPL-MCNC: 11 MG/DL (ref 8–23)
CALCIUM SERPL-MCNC: 9.7 MG/DL (ref 8.3–10.4)
CHLORIDE SERPL-SCNC: 109 MMOL/L (ref 101–110)
CO2 SERPL-SCNC: 27 MMOL/L (ref 21–32)
CREAT SERPL-MCNC: 1.08 MG/DL (ref 0.8–1.5)
DIFFERENTIAL METHOD BLD: ABNORMAL
EOSINOPHIL # BLD: 0.1 K/UL (ref 0–0.8)
EOSINOPHIL NFR BLD: 2 % (ref 0.5–7.8)
ERYTHROCYTE [DISTWIDTH] IN BLOOD BY AUTOMATED COUNT: 12.8 % (ref 11.9–14.6)
GLUCOSE SERPL-MCNC: 124 MG/DL (ref 65–100)
HCT VFR BLD AUTO: 40.8 % (ref 41.1–50.3)
HGB BLD-MCNC: 13.4 G/DL (ref 13.6–17.2)
IMM GRANULOCYTES # BLD AUTO: 0 K/UL (ref 0–0.5)
IMM GRANULOCYTES NFR BLD AUTO: 1 % (ref 0–5)
LYMPHOCYTES # BLD: 1.4 K/UL (ref 0.5–4.6)
LYMPHOCYTES NFR BLD: 21 % (ref 13–44)
MCH RBC QN AUTO: 29.8 PG (ref 26.1–32.9)
MCHC RBC AUTO-ENTMCNC: 32.8 G/DL (ref 31.4–35)
MCV RBC AUTO: 90.9 FL (ref 82–102)
MONOCYTES # BLD: 0.7 K/UL (ref 0.1–1.3)
MONOCYTES NFR BLD: 10 % (ref 4–12)
NEUTS SEG # BLD: 4.4 K/UL (ref 1.7–8.2)
NEUTS SEG NFR BLD: 66 % (ref 43–78)
NRBC # BLD: 0 K/UL (ref 0–0.2)
PLATELET # BLD AUTO: 200 K/UL (ref 150–450)
PMV BLD AUTO: 9.2 FL (ref 9.4–12.3)
POTASSIUM SERPL-SCNC: 3.6 MMOL/L (ref 3.5–5.1)
RBC # BLD AUTO: 4.49 M/UL (ref 4.23–5.6)
SODIUM SERPL-SCNC: 142 MMOL/L (ref 133–143)
WBC # BLD AUTO: 6.7 K/UL (ref 4.3–11.1)

## 2023-09-10 PROCEDURE — 80048 BASIC METABOLIC PNL TOTAL CA: CPT

## 2023-09-10 PROCEDURE — 2580000003 HC RX 258: Performed by: INTERNAL MEDICINE

## 2023-09-10 PROCEDURE — 6370000000 HC RX 637 (ALT 250 FOR IP): Performed by: FAMILY MEDICINE

## 2023-09-10 PROCEDURE — 36415 COLL VENOUS BLD VENIPUNCTURE: CPT

## 2023-09-10 PROCEDURE — 6370000000 HC RX 637 (ALT 250 FOR IP): Performed by: INTERNAL MEDICINE

## 2023-09-10 PROCEDURE — 6360000002 HC RX W HCPCS: Performed by: FAMILY MEDICINE

## 2023-09-10 PROCEDURE — 71045 X-RAY EXAM CHEST 1 VIEW: CPT

## 2023-09-10 PROCEDURE — 85025 COMPLETE CBC W/AUTO DIFF WBC: CPT

## 2023-09-10 PROCEDURE — 97535 SELF CARE MNGMENT TRAINING: CPT

## 2023-09-10 PROCEDURE — 97530 THERAPEUTIC ACTIVITIES: CPT

## 2023-09-10 PROCEDURE — 2000000000 HC ICU R&B

## 2023-09-10 PROCEDURE — 92610 EVALUATE SWALLOWING FUNCTION: CPT

## 2023-09-10 PROCEDURE — 2580000003 HC RX 258: Performed by: FAMILY MEDICINE

## 2023-09-10 PROCEDURE — 6360000002 HC RX W HCPCS: Performed by: INTERNAL MEDICINE

## 2023-09-10 RX ORDER — LORAZEPAM 2 MG/ML
1 INJECTION INTRAMUSCULAR EVERY 4 HOURS PRN
Status: DISCONTINUED | OUTPATIENT
Start: 2023-09-10 | End: 2023-09-20 | Stop reason: HOSPADM

## 2023-09-10 RX ADMIN — LORAZEPAM 1 MG: 2 INJECTION INTRAMUSCULAR; INTRAVENOUS at 22:12

## 2023-09-10 RX ADMIN — HALOPERIDOL LACTATE 2 MG: 5 INJECTION, SOLUTION INTRAMUSCULAR at 20:52

## 2023-09-10 RX ADMIN — DONEPEZIL HYDROCHLORIDE 10 MG: 5 TABLET, FILM COATED ORAL at 20:41

## 2023-09-10 RX ADMIN — HALOPERIDOL LACTATE 2 MG: 5 INJECTION, SOLUTION INTRAMUSCULAR at 17:21

## 2023-09-10 RX ADMIN — HALOPERIDOL LACTATE 2 MG: 5 INJECTION, SOLUTION INTRAMUSCULAR at 02:59

## 2023-09-10 RX ADMIN — MEMANTINE 10 MG: 5 TABLET ORAL at 20:45

## 2023-09-10 RX ADMIN — DIVALPROEX SODIUM 500 MG: 125 CAPSULE, COATED PELLETS ORAL at 09:22

## 2023-09-10 RX ADMIN — AMPICILLIN SODIUM AND SULBACTAM SODIUM 3000 MG: 2; 1 INJECTION, POWDER, FOR SOLUTION INTRAMUSCULAR; INTRAVENOUS at 09:23

## 2023-09-10 RX ADMIN — ENOXAPARIN SODIUM 40 MG: 100 INJECTION SUBCUTANEOUS at 09:20

## 2023-09-10 RX ADMIN — MEMANTINE 10 MG: 5 TABLET ORAL at 09:22

## 2023-09-10 RX ADMIN — DIVALPROEX SODIUM 500 MG: 125 CAPSULE, COATED PELLETS ORAL at 20:52

## 2023-09-10 RX ADMIN — AMPICILLIN SODIUM AND SULBACTAM SODIUM 3000 MG: 2; 1 INJECTION, POWDER, FOR SOLUTION INTRAMUSCULAR; INTRAVENOUS at 20:26

## 2023-09-10 RX ADMIN — OLANZAPINE 10 MG: 5 TABLET, FILM COATED ORAL at 09:21

## 2023-09-10 RX ADMIN — LOSARTAN POTASSIUM 50 MG: 50 TABLET, FILM COATED ORAL at 09:22

## 2023-09-10 RX ADMIN — AMPICILLIN SODIUM AND SULBACTAM SODIUM 3000 MG: 2; 1 INJECTION, POWDER, FOR SOLUTION INTRAMUSCULAR; INTRAVENOUS at 15:47

## 2023-09-10 RX ADMIN — ATORVASTATIN CALCIUM 10 MG: 10 TABLET, FILM COATED ORAL at 09:22

## 2023-09-10 RX ADMIN — SODIUM CHLORIDE: 9 INJECTION, SOLUTION INTRAVENOUS at 17:33

## 2023-09-10 RX ADMIN — TAMSULOSIN HYDROCHLORIDE 0.4 MG: 0.4 CAPSULE ORAL at 09:22

## 2023-09-10 RX ADMIN — SODIUM CHLORIDE, PRESERVATIVE FREE 10 ML: 5 INJECTION INTRAVENOUS at 20:56

## 2023-09-10 RX ADMIN — ACETAMINOPHEN 650 MG: 650 SUPPOSITORY RECTAL at 04:08

## 2023-09-10 RX ADMIN — ASPIRIN 81 MG 81 MG: 81 TABLET ORAL at 09:22

## 2023-09-10 RX ADMIN — SERTRALINE 150 MG: 100 TABLET, FILM COATED ORAL at 09:22

## 2023-09-10 RX ADMIN — Medication 3 MG: at 20:41

## 2023-09-10 RX ADMIN — OLANZAPINE 10 MG: 5 TABLET, FILM COATED ORAL at 20:46

## 2023-09-10 ASSESSMENT — PAIN SCALES - GENERAL: PAINLEVEL_OUTOF10: 0

## 2023-09-10 NOTE — PROGRESS NOTES
ACUTE PHYSICAL THERAPY GOALS:   (Developed with and agreed upon by patient and/or caregiver.)  STG:  (1.)Mr. Alphonso Maxwell will move from supine to sit and sit to supine  with MINIMAL ASSIST within 4-7 treatment day(s). -GOAL MET 9/8/23     (2.)Mr. Alphonso Maxwell will transfer from bed to chair and chair to bed with MINIMAL ASSIST using the least restrictive device within 4-7 treatment day(s). Met 9/10  (3.)Mr. Alphonso Maxwell will ambulate with MINIMAL ASSIST for 75 feet with the least restrictive device within 4-7 treatment day(s). Met 9/10    GOALS MODIFIED 9/10/23 :  1) Bed mobility with SBA, flat bed no rail. 2) transfers with walker & CGA. 3) pt ambulating 200-250ft with rolling walker & CGA    _______________________________________________________________________________________________     PHYSICAL THERAPY Re-evaluation and AM  (Link to Caseload Tracking: PT Visit Days : 1  Acknowledge Orders  Time In/Out  PT Charge Capture  Rehab Caseload Tracker    Jeanna Marin is a 70 y.o. male   PRIMARY DIAGNOSIS: Acute psychosis ([address])  Delirium [R41.0]  Hospital admission due to social situation [Z60.9]  Severe dementia with psychotic disturbance, unspecified dementia type ([address]) [F03. C2]  Acute psychosis ([address]) [F23]       Reason for Referral: Generalized Muscle Weakness (M62.81)  Difficulty in walking, Not elsewhere classified (R26.2)  Inpatient: Payor: Iban Cuellar / Plan: MEDICARE PART A AND B / Product Type: *No Product type* /     ASSESSMENT:     REHAB RECOMMENDATIONS:   Recommendation to date pending progress:  Setting:  Short-term Rehab    Equipment:    To Be Determined     ASSESSMENT:  Mr. Alphonso Maxwell presented as lethargic , constant cues needed to keep eyes open throughout session, speech was mostly not understandable, but pt seemed to follow simple 1 step directions well. Pt showed increased gait distance & required less assist for transfers & gait.  This pt will need additional in-pt rehab at a SNF on hospital DC & pt will Cognition  []  Pt confused, no safety awareness     MOBILITY: I Mod I S SBA CGA Min Mod Max Total  NT x2 Comments:   Bed Mobility    Rolling [] [] [] [] [x] [] [] [] [] [] []    Supine to Sit [] [] [] [] [x] [] [] [] [] [] []    Scooting [] [] [] [] [x] [] [] [] [] [] []    Sit to Supine [] [] [] [] [] [] [] [] [] [] []    Transfers    Sit to Stand [] [] [] [] [] [x] [] [] [] [] []    Bed to Chair [] [] [] [] [] [x] [] [] [] [] [] With walker   Stand to Sit [] [] [] [] [] [x] [] [] [] [] []     [] [] [] [] [] [] [] [] [] [] []    I=Independent, Mod I=Modified Independent, S=Supervision, SBA=Standby Assistance, CGA=Contact Guard Assistance,   Min=Minimal Assistance, Mod=Moderate Assistance, Max=Maximal Assistance, Total=Total Assistance, NT=Not Tested    GAIT: I Mod I S SBA CGA Min Mod Max Total  NT x2 Comments:   Level of Assistance [] [] [] [] [] [x] [x] [] [] [] [] 2 people needed for safety and line management   Distance 150 feet    DME Rolling Walker    Gait Quality Decreased gordy , Decreased step clearance, Decreased step length, Narrow base of support, Shuffling , and Trunk sway increased    Weightbearing Status N/A     Stairs NT     I=Independent, Mod I=Modified Independent, S=Supervision, SBA=Standby Assistance, CGA=Contact Guard Assistance,   Min=Minimal Assistance, Mod=Moderate Assistance, Max=Maximal Assistance, Total=Total Assistance, NT=Not Tested    PLAN:   FREQUENCY AND DURATION: Daily for duration of hospital stay or until stated goals are met, whichever comes first.    THERAPY PROGNOSIS: Good    PROBLEM LIST:   (Skilled intervention is medically necessary to address:)  Decreased ADL/Functional Activities  Decreased Activity Tolerance  Decreased AROM/PROM  Decreased Balance  Decreased Cognition  Decreased Coordination  Decreased Gait Ability  Decreased Safety Awareness  Decreased Strength  Decreased Transfer Abilities INTERVENTIONS PLANNED:   (Benefits and precautions of physical therapy have

## 2023-09-10 NOTE — PROGRESS NOTES
LTG: patient will tolerate safest, least restrictive oral diet without s/sx airway compromise  STG: Patient will tolerate minced and moist diet and thin liquids without overt s/sx aspiration  STG: Patient will tolerate ongoing po trials in efforts to advance diet  STG: Patient will participate in instrumental swallowing assessment to objectively assess oropharyngeal swallow if clinically indicated      SPEECH LANGUAGE PATHOLOGY: DYSPHAGIA  Initial Assessment    NAME: Anais Chaparro III  : 1955  MRN: [id number]    ADMISSION DATE: 9/3/2023  PRIMARY DIAGNOSIS: Acute psychosis ([address])  Delirium [R41.0]  Hospital admission due to social situation [Z60.9]  Severe dementia with psychotic disturbance, unspecified dementia type ([address]) [F03. C2]  Acute psychosis ([address]) [F23]    ICD-10: Treatment Diagnosis: R13.12 Dysphagia, Oropharyngeal Phase    RECOMMENDATIONS   Diet:  Diet Solids Recommendation: Minced & Moist  Liquid Consistency Recommendation: Thin    Medications: Crushed in puree as able     Compensatory Swallowing Strategies: Alternate solids and liquids;Small bites/sips; Remain upright for 30-45 minutes after meals;Upright as possible for all oral intake;Eat/Feed slowly   Recommendations: Dysphagia treatment;Supervision with meals    Therapeutic Interventions: Diet tolerance monitoring;Patient/Family education   Patient continues to require skilled intervention:  Yes. Recommend ongoing speech therapy services during this hospitalization      ASSESSMENT    Dysphagia Diagnosis: Mild to moderate oral stage dysphagia  Dysphagia Impression : Patient presents with mentation as greatest barrier to safe PO intake. Easily distracted and talking with PO in oral cavity at times. Increased mastication time with solids, but adequate oral clearance. No overt s/sx with any/all consistencies. Baseline throat clearing present which family reports has been his baseline for \"all his life. \" Recommend minced and moist diet, thin

## 2023-09-10 NOTE — PROGRESS NOTES
ACUTE OCCUPATIONAL THERAPY GOALS:   (Developed with and agreed upon by patient and/or caregiver.)  1. Patient will perform grooming with min assist.-GOAL MET 9/10/23    2. Patient will perform upper body dressing with min assist. -GOAL MET 9/10/23    3. Patient will perform lower body dressing with min assist.  4. Patient will perform bathing with min assist.  5. Patient will perform toileting and toilet transfer with min assist.  6. Patient will perform ADL functional mobility and tranfers in room with min assist.  7. Patient to follow simple directions with mod verbal and tactile cues. Goals to be achieved in 7 days. OCCUPATIONAL THERAPY Daily Note and AM       OT Visit Days: 4  Acknowledge Orders  Time  OT Charge Capture  Rehab Caseload Tracker      Guillermo Gonzalez is a 70 y.o. male   PRIMARY DIAGNOSIS: Acute psychosis ([address])  Delirium [R41.0]  Hospital admission due to social situation [Z60.9]  Severe dementia with psychotic disturbance, unspecified dementia type ([address]) [F03. C2]  Acute psychosis ([address]) [F23]       Reason for Referral: Other lack of cordination (R27.8)  Difficulty in walking, Not elsewhere classified (R26.2)  Other abnormalities of gait and mobility (R26.89)  Inpatient: Payor: MEDICARE / Plan: MEDICARE PART A AND B / Product Type: *No Product type* /     ASSESSMENT:     REHAB RECOMMENDATIONS:   Recommendation to date pending progress:  Setting:  Short-term Rehab    Equipment:    To Be Determined     ASSESSMENT:  Mr. Luís Suarez sitting up in recliner. Nursing had gotten him to the recliner. He was total assist for dressing,and bathing. He is talking but making gibberish but clear at times verbalizations. He was cooperative but needed hand held assist to participate. CNA reported he was unable to approximate his mouth to the straw today. He stood with min x 2 and took steps into the hallway and then felt shaky and returned to sit. He was set up in his recliner. Nursing was present.  He would

## 2023-09-10 NOTE — PROGRESS NOTES
Discussed failed swallow screen last night with nightshift (coughing for ~1hour after attempting to take water) with Dr. Miller Said. SLP genaro already ordered. Nursing had been keeping patient NPO. Dr. Miller Said stated he still wanted the PO medications given. All meds to be crushed and given in apple sauce.

## 2023-09-11 ENCOUNTER — TELEPHONE (OUTPATIENT)
Dept: FAMILY MEDICINE CLINIC | Facility: CLINIC | Age: 68
End: 2023-09-11

## 2023-09-11 LAB
ANION GAP SERPL CALC-SCNC: 5 MMOL/L (ref 2–11)
BUN SERPL-MCNC: 12 MG/DL (ref 8–23)
CALCIUM SERPL-MCNC: 9.1 MG/DL (ref 8.3–10.4)
CHLORIDE SERPL-SCNC: 110 MMOL/L (ref 101–110)
CO2 SERPL-SCNC: 28 MMOL/L (ref 21–32)
CREAT SERPL-MCNC: 0.9 MG/DL (ref 0.8–1.5)
ERYTHROCYTE [DISTWIDTH] IN BLOOD BY AUTOMATED COUNT: 12.9 % (ref 11.9–14.6)
GLUCOSE SERPL-MCNC: 117 MG/DL (ref 65–100)
HCT VFR BLD AUTO: 37.2 % (ref 41.1–50.3)
HGB BLD-MCNC: 12.1 G/DL (ref 13.6–17.2)
MAGNESIUM SERPL-MCNC: 1.8 MG/DL (ref 1.8–2.4)
MCH RBC QN AUTO: 30.2 PG (ref 26.1–32.9)
MCHC RBC AUTO-ENTMCNC: 32.5 G/DL (ref 31.4–35)
MCV RBC AUTO: 92.8 FL (ref 82–102)
NRBC # BLD: 0 K/UL (ref 0–0.2)
PLATELET # BLD AUTO: 184 K/UL (ref 150–450)
PMV BLD AUTO: 9.2 FL (ref 9.4–12.3)
POTASSIUM SERPL-SCNC: 3.3 MMOL/L (ref 3.5–5.1)
RBC # BLD AUTO: 4.01 M/UL (ref 4.23–5.6)
SODIUM SERPL-SCNC: 143 MMOL/L (ref 133–143)
VALPROATE SERPL-MCNC: 77 UG/ML (ref 50–100)
WBC # BLD AUTO: 7.6 K/UL (ref 4.3–11.1)

## 2023-09-11 PROCEDURE — 6360000002 HC RX W HCPCS: Performed by: INTERNAL MEDICINE

## 2023-09-11 PROCEDURE — 2000000000 HC ICU R&B

## 2023-09-11 PROCEDURE — 2580000003 HC RX 258: Performed by: INTERNAL MEDICINE

## 2023-09-11 PROCEDURE — 99233 SBSQ HOSP IP/OBS HIGH 50: CPT

## 2023-09-11 PROCEDURE — 6370000000 HC RX 637 (ALT 250 FOR IP): Performed by: FAMILY MEDICINE

## 2023-09-11 PROCEDURE — 6370000000 HC RX 637 (ALT 250 FOR IP): Performed by: INTERNAL MEDICINE

## 2023-09-11 PROCEDURE — 51798 US URINE CAPACITY MEASURE: CPT

## 2023-09-11 PROCEDURE — 6360000002 HC RX W HCPCS: Performed by: FAMILY MEDICINE

## 2023-09-11 PROCEDURE — 80164 ASSAY DIPROPYLACETIC ACD TOT: CPT

## 2023-09-11 PROCEDURE — 2580000003 HC RX 258: Performed by: FAMILY MEDICINE

## 2023-09-11 PROCEDURE — 2700000000 HC OXYGEN THERAPY PER DAY

## 2023-09-11 PROCEDURE — 36415 COLL VENOUS BLD VENIPUNCTURE: CPT

## 2023-09-11 PROCEDURE — 83735 ASSAY OF MAGNESIUM: CPT

## 2023-09-11 PROCEDURE — 85027 COMPLETE CBC AUTOMATED: CPT

## 2023-09-11 PROCEDURE — 80048 BASIC METABOLIC PNL TOTAL CA: CPT

## 2023-09-11 PROCEDURE — 94761 N-INVAS EAR/PLS OXIMETRY MLT: CPT

## 2023-09-11 RX ORDER — CLONAZEPAM 0.5 MG/1
0.5 TABLET ORAL NIGHTLY
Status: DISCONTINUED | OUTPATIENT
Start: 2023-09-11 | End: 2023-09-11

## 2023-09-11 RX ORDER — DIVALPROEX SODIUM 125 MG/1
500 CAPSULE, COATED PELLETS ORAL EVERY 8 HOURS SCHEDULED
Status: DISCONTINUED | OUTPATIENT
Start: 2023-09-11 | End: 2023-09-16

## 2023-09-11 RX ORDER — RISPERIDONE 1 MG/1
1 TABLET ORAL 2 TIMES DAILY
Status: DISCONTINUED | OUTPATIENT
Start: 2023-09-11 | End: 2023-09-16

## 2023-09-11 RX ORDER — CLONAZEPAM 0.5 MG/1
0.25 TABLET ORAL EVERY 12 HOURS
Status: DISCONTINUED | OUTPATIENT
Start: 2023-09-11 | End: 2023-09-11

## 2023-09-11 RX ADMIN — HALOPERIDOL LACTATE 2 MG: 5 INJECTION, SOLUTION INTRAMUSCULAR at 02:59

## 2023-09-11 RX ADMIN — SODIUM CHLORIDE: 9 INJECTION, SOLUTION INTRAVENOUS at 15:34

## 2023-09-11 RX ADMIN — RISPERIDONE 1 MG: 1 TABLET ORAL at 20:17

## 2023-09-11 RX ADMIN — ACETAMINOPHEN 650 MG: 325 TABLET, FILM COATED ORAL at 20:16

## 2023-09-11 RX ADMIN — ENOXAPARIN SODIUM 40 MG: 100 INJECTION SUBCUTANEOUS at 09:05

## 2023-09-11 RX ADMIN — HALOPERIDOL LACTATE 2 MG: 5 INJECTION, SOLUTION INTRAMUSCULAR at 17:16

## 2023-09-11 RX ADMIN — MEMANTINE 10 MG: 5 TABLET ORAL at 20:17

## 2023-09-11 RX ADMIN — AMPICILLIN SODIUM AND SULBACTAM SODIUM 3000 MG: 2; 1 INJECTION, POWDER, FOR SOLUTION INTRAMUSCULAR; INTRAVENOUS at 15:35

## 2023-09-11 RX ADMIN — DONEPEZIL HYDROCHLORIDE 10 MG: 5 TABLET, FILM COATED ORAL at 20:17

## 2023-09-11 RX ADMIN — AMPICILLIN SODIUM AND SULBACTAM SODIUM 3000 MG: 2; 1 INJECTION, POWDER, FOR SOLUTION INTRAMUSCULAR; INTRAVENOUS at 20:12

## 2023-09-11 RX ADMIN — SODIUM CHLORIDE, PRESERVATIVE FREE 10 ML: 5 INJECTION INTRAVENOUS at 09:07

## 2023-09-11 RX ADMIN — Medication 3 MG: at 20:17

## 2023-09-11 RX ADMIN — AMPICILLIN SODIUM AND SULBACTAM SODIUM 3000 MG: 2; 1 INJECTION, POWDER, FOR SOLUTION INTRAMUSCULAR; INTRAVENOUS at 02:58

## 2023-09-11 RX ADMIN — AMPICILLIN SODIUM AND SULBACTAM SODIUM 3000 MG: 2; 1 INJECTION, POWDER, FOR SOLUTION INTRAMUSCULAR; INTRAVENOUS at 07:34

## 2023-09-11 ASSESSMENT — PAIN SCALES - GENERAL
PAINLEVEL_OUTOF10: 0

## 2023-09-11 NOTE — TELEPHONE ENCOUNTER
Pt sister would like to know if it's possible to get letter stating that the pt can no longer make life or medical decisions. She says that the pt has very bad anger outburst and the pt is being treated in the hospital for a recent episode. If that is not something that can be provided please let her know either way.

## 2023-09-11 NOTE — PROGRESS NOTES
Pt has not voided. Bladder scanner shows that he is retaining 700 ml. Unable to get patient to void in the urinal. Spoke with Dr. Lisa Baxter who gave orders for us to replace the Navarro.

## 2023-09-11 NOTE — PROGRESS NOTES
Hospitalist Progress Note   Admit Date:  9/3/2023  8:00 PM   Name:  Amy Olsen III   Age:  70 y.o. Sex:  male  :  1955   MRN:  [id number]   Room:  [facility]/01    Presenting/Chief Complaint: No chief complaint on file. Reason(s) for Admission: Delirium [R41.0]  Hospital admission due to social situation [Z60.9]  Severe dementia with psychotic disturbance, unspecified dementia type ([address] [facility]) [F03. C2]  Acute psychosis [facility]) [address] Course:   Amy Olsen III is a 70 y.o. male with medical history of early onset dementia, hypertension, diet-controlled diabetes admitted with worsening confusion and hallucination likely in the setting of progressive dementia. Patient became increasingly agitated and required antipsychotic medication and four-point restraints. Transferred to ICU for Precedex drip. Lab workup and CT head no evidence of any abnormalities to explain behavior. Zyprexa was ineffective so this was stopped. Pt was started on seroquel and this also did not seem to work at 100mg doses. Depakote added. Seroquel switched to risperdal 9-8. Subjective & 24hr Events:   Got agitated again overnight and required restraints again. Required ativan IV. Now sleeping    Discussed with nursing and IDT      Assessment & Plan:       Fever, Suspected aspiration PNA  -cont unasyn  -nothing on CXR but findings lag behind clinical events  -WBC wnl.  -on modified diet per SLP. Aspiration precautions      Acute psychosis with severe dementia [facility])  -consult psych for follow up recs.   If patient cannot stay psych stable, cannot discharge to SNF  -also has to remain out of restraints 24 hours to be discharged  -cont zyprexa current dose for now.    -cont depakote 500mg BID  -start klonopin 0.5mg nightly; not ideal but have tried multiple other agents and he continues to get agitated at night  -try to keep patient awake during day  -haldol IV PRN   -cardiac monitoring while getting

## 2023-09-11 NOTE — PROGRESS NOTES
Pt began getting more and more agitated. He was given Haldol and we were able to get his PO meds in him. We attempted to keep him calm while waiting for medications to kick in but patient continued to escalate. Pt pushed his way to the end of the bed attempting to get up. We tried to coax patient to lay back down but he instead stood up and began attempting to hit staff. A code Joao Marquez was called. He was able to hit this RN and then we had to grab patient under arms and put him back into the bed. Pt then began attempting to hit and kick staff. He was able to kick one  who was attempting to help settle him. Patient placed in restraints. Patient's sister, Mary Jane Baeza, called to check on him and said that based on his behavior before she left she was afraid that things would escalate tonight. She said that he was showing his normal signs that he does before getting agitated. She was given a full update and all questions answered. Pt still restless but not as agitated.

## 2023-09-11 NOTE — PROGRESS NOTES
PT made 2 attempts to see pt this am. On 1st attempt, when trying wake pt up, pt began to be agitated & attempted to swing at PT. RN suggested that therapy return after pt receives medicine that is designed to reduce agitated behavior. On 2nd attempt, RN stated that ativan was still affecting pt's responsiveness & she could not get pt awake enough to take the medication that was to help with his behavior. PT held therapy today & will follow up as indicated.   Maude Block, PT, LORRAINE

## 2023-09-11 NOTE — FLOWSHEET NOTE
09/11/23 0653   Handoff   Communication Given Shift Handoff   Handoff Given To [address] Received From JERICHO Kumar   Handoff Communication Face to Face; At bedside   Time Handoff Given [id number]   End of Shift Check Performed Yes

## 2023-09-11 NOTE — CARE COORDINATION
Discharge planning was discussed with the Critical Care Interdisciplinary Team. He is pending a repeat psychiatry evaluation and placement for short term rehabilitation.

## 2023-09-11 NOTE — TELEPHONE ENCOUNTER
Pt. sister Ms. Kevon Schwartz was called to inform of enclosed information per Dr. Eric Wright for request of letter. She Acknowledged enclosed information. She reports she will contact hospitalist/ doctors she reports need two letters from doctors per her  for Trust purpose. She will return call to office if any further concern.

## 2023-09-11 NOTE — PROGRESS NOTES
Occupational Therapy Note:    Attempted to see patient this AM for occupational therapy treatment  session. Patient remains too drowsy to safely participate in treatment session this AM. Will follow and re-attempt as schedule permits/patient available.      Thank you,  Leidy Moulton OTR/L

## 2023-09-11 NOTE — PROGRESS NOTES
PT was resting with Sister and Sitter at bedside. [id number] [id number] [facility] introduced self. Sister expressed concern for PT. Sister expressed importance of and comfort in [place] and Prayer. PT is [employer].  offered prayer. [id number] [phone] [facility] checked for unmet needs and offered support. . Afshan Galicia M.Div.

## 2023-09-12 PROCEDURE — 6370000000 HC RX 637 (ALT 250 FOR IP): Performed by: FAMILY MEDICINE

## 2023-09-12 PROCEDURE — 94761 N-INVAS EAR/PLS OXIMETRY MLT: CPT

## 2023-09-12 PROCEDURE — 6370000000 HC RX 637 (ALT 250 FOR IP): Performed by: INTERNAL MEDICINE

## 2023-09-12 PROCEDURE — 6360000002 HC RX W HCPCS: Performed by: FAMILY MEDICINE

## 2023-09-12 PROCEDURE — 2000000000 HC ICU R&B

## 2023-09-12 PROCEDURE — 92526 ORAL FUNCTION THERAPY: CPT

## 2023-09-12 PROCEDURE — 6360000002 HC RX W HCPCS: Performed by: INTERNAL MEDICINE

## 2023-09-12 PROCEDURE — 2580000003 HC RX 258: Performed by: FAMILY MEDICINE

## 2023-09-12 PROCEDURE — 2580000003 HC RX 258: Performed by: INTERNAL MEDICINE

## 2023-09-12 RX ORDER — DIMETHICONE, CAMPHOR (SYNTHETIC), MENTHOL, AND PHENOL 1.1; .5; .625; .5 G/100G; G/100G; G/100G; G/100G
OINTMENT TOPICAL PRN
Status: DISCONTINUED | OUTPATIENT
Start: 2023-09-12 | End: 2023-09-20 | Stop reason: HOSPADM

## 2023-09-12 RX ADMIN — SODIUM CHLORIDE, PRESERVATIVE FREE 10 ML: 5 INJECTION INTRAVENOUS at 19:52

## 2023-09-12 RX ADMIN — ATORVASTATIN CALCIUM 10 MG: 10 TABLET, FILM COATED ORAL at 08:44

## 2023-09-12 RX ADMIN — DIVALPROEX SODIUM 500 MG: 125 CAPSULE, COATED PELLETS ORAL at 22:07

## 2023-09-12 RX ADMIN — Medication: at 04:07

## 2023-09-12 RX ADMIN — MEMANTINE 10 MG: 5 TABLET ORAL at 19:52

## 2023-09-12 RX ADMIN — AMPICILLIN SODIUM AND SULBACTAM SODIUM 3000 MG: 2; 1 INJECTION, POWDER, FOR SOLUTION INTRAMUSCULAR; INTRAVENOUS at 19:50

## 2023-09-12 RX ADMIN — LOSARTAN POTASSIUM 50 MG: 50 TABLET, FILM COATED ORAL at 08:44

## 2023-09-12 RX ADMIN — DIVALPROEX SODIUM 500 MG: 125 CAPSULE, COATED PELLETS ORAL at 06:02

## 2023-09-12 RX ADMIN — MEMANTINE 10 MG: 5 TABLET ORAL at 08:44

## 2023-09-12 RX ADMIN — DIVALPROEX SODIUM 500 MG: 125 CAPSULE, COATED PELLETS ORAL at 14:22

## 2023-09-12 RX ADMIN — AMPICILLIN SODIUM AND SULBACTAM SODIUM 3000 MG: 2; 1 INJECTION, POWDER, FOR SOLUTION INTRAMUSCULAR; INTRAVENOUS at 07:46

## 2023-09-12 RX ADMIN — TAMSULOSIN HYDROCHLORIDE 0.4 MG: 0.4 CAPSULE ORAL at 08:44

## 2023-09-12 RX ADMIN — AMPICILLIN SODIUM AND SULBACTAM SODIUM 3000 MG: 2; 1 INJECTION, POWDER, FOR SOLUTION INTRAMUSCULAR; INTRAVENOUS at 01:52

## 2023-09-12 RX ADMIN — AMPICILLIN SODIUM AND SULBACTAM SODIUM 3000 MG: 2; 1 INJECTION, POWDER, FOR SOLUTION INTRAMUSCULAR; INTRAVENOUS at 14:24

## 2023-09-12 RX ADMIN — ENOXAPARIN SODIUM 40 MG: 100 INJECTION SUBCUTANEOUS at 08:48

## 2023-09-12 RX ADMIN — DONEPEZIL HYDROCHLORIDE 10 MG: 5 TABLET, FILM COATED ORAL at 19:51

## 2023-09-12 RX ADMIN — SODIUM CHLORIDE, PRESERVATIVE FREE 10 ML: 5 INJECTION INTRAVENOUS at 08:49

## 2023-09-12 RX ADMIN — RISPERIDONE 1 MG: 1 TABLET ORAL at 19:52

## 2023-09-12 RX ADMIN — ASPIRIN 81 MG 81 MG: 81 TABLET ORAL at 08:45

## 2023-09-12 RX ADMIN — RISPERIDONE 1 MG: 1 TABLET ORAL at 08:44

## 2023-09-12 RX ADMIN — SODIUM CHLORIDE: 9 INJECTION, SOLUTION INTRAVENOUS at 14:24

## 2023-09-12 RX ADMIN — SERTRALINE 150 MG: 100 TABLET, FILM COATED ORAL at 08:45

## 2023-09-12 RX ADMIN — Medication 3 MG: at 19:52

## 2023-09-12 ASSESSMENT — PAIN SCALES - GENERAL
PAINLEVEL_OUTOF10: 0

## 2023-09-12 NOTE — BH NOTE
PSYCHIATRIC EVALUATION- PROGRESS NOTE    Date of Service: 23    Patient Name: Candace Chin III  Patient : 1955  Patient MRN: [id number]      Referral Source:  referred by Dr. Ioana Butts  History  From: Aura Grant record, reason patient could not give history:  altered mental status  Record Review: moderate    Chief Complaint:    History of Present Illness:  Candace Chin III is a 70 y.o. male with a history significant for vascular dementia without behavioral disturbance, cognitive communication deficit, confusion with hallucinations. Pt has a PMH that includes:   sick sinus syndrome, HTN, GERD, pacemaker, BPH, hyperlipidemia, DJD, NIDDM. Patient was seen and evaluated by Psych NP on 23 for worsening dementia, aggression. Recommendation for   -Recommend weaning Precedex as tolerates, could add Depakote 125 mg-250 mg BID as tolerates with the option to increase to 500 mg BID - to target unstable mood - monitor for - drowsiness, GI upset, tremors, tiredness, low platelet count, constipation, mood swings, vision changes, agitation, involuntary eye movements  -Monitor for sedation/adverse effects, LFTs, check Depakote levels in 3-5 days  Continue Seroquel 100 mg PO HS, Seroquel 50 mg PO QD. Could consider discontinuing Seroquel 25mg BID PRN,  Zyprexa 5 mg PO twice a day PRN. Haldol 2 mg IM PRN  acute agitation only . Since then patient was weaned off precedex on 23. Depakote was started on 23 at 500mg BID   Has been given Haldol PRN several times due to agitation 9/10 3X,  1x  Ativan given 9/10 1012pm  Zyprexa was increased to 20mg   Zoloft is being given 150mg   Klonopin ordered but held  Geodon given for agitation ; (also  and )  Seroquel and Risperdal on  and , unclear why only few doses were given before being discontinued.     RN reports patient was agitated over night, requiring PRN Ativan, has been sleeping/somnolent throughout the AM. Unable to take PO mg, 2 mg, IntraVENous, Q4H PRN, Bonny Soni MD, 2 mg at 09/11/23 1716    tamsulosin (FLOMAX) capsule 0.4 mg, 0.4 mg, Oral, Daily, Bonny Soni MD, 0.4 mg at 09/12/23 0844    phenol 1.4 % mouth spray 1 spray, 1 spray, Mouth/Throat, Q2H PRN, Bonny Soni MD    hydrALAZINE (APRESOLINE) injection 10 mg, 10 mg, IntraVENous, Q4H PRN **OR** hydrALAZINE (APRESOLINE) injection 20 mg, 20 mg, IntraVENous, Q4H PRN, Bonny Soni MD, 20 mg at 09/07/23 0313    melatonin tablet 3 mg, 3 mg, Oral, Nightly, Bonny Soni MD, 3 mg at 09/11/23 2017    aspirin chewable tablet 81 mg, 81 mg, Oral, Daily, Rosy Sibley MD, 81 mg at 09/12/23 0845    donepezil (ARICEPT) tablet 10 mg, 10 mg, Oral, Nightly, Sony Duenas MD, 10 mg at 09/11/23 2017    memantine (NAMENDA) tablet 10 mg, 10 mg, Oral, BID, Sony Duenas MD, 10 mg at 09/12/23 0844    losartan (COZAAR) tablet 50 mg, 50 mg, Oral, Daily, Sony Duenas MD, 50 mg at 09/12/23 0844    sertraline (ZOLOFT) tablet 150 mg, 150 mg, Oral, Daily, Sony Duenas MD, 150 mg at 09/12/23 0845    atorvastatin (LIPITOR) tablet 10 mg, 10 mg, Oral, Daily, Sony Duenas MD, 10 mg at 09/12/23 0844    glucose chewable tablet 16 g, 4 tablet, Oral, PRN, Sony Duenas MD    dextrose bolus 10% 125 mL, 125 mL, IntraVENous, PRN **OR** dextrose bolus 10% 250 mL, 250 mL, IntraVENous, PRN, Sony Duenas MD    Glucagon Emergency KIT 1 mg, 1 mg, SubCUTAneous, PRN, Sony Duenas MD    dextrose 10 % infusion, , IntraVENous, Continuous PRN, Sony Duenas MD    0.9 % sodium chloride infusion, , IntraVENous, Continuous, Bonny Soni MD, Last Rate: 50 mL/hr at 09/11/23 1534, New Bag at 09/11/23 1534    sodium chloride flush 0.9 % injection 5-40 mL, 5-40 mL, IntraVENous, 2 times per day, Sony Duenas MD, 10 mL at 09/12/23 0849    sodium chloride flush 0.9 % injection 5-40 mL, 5-40 mL, IntraVENous, PRN, Jinx Spray lying in bed. Does appear stated age. No acute distress. Behavior/Motor:  psychomotor agitation and involuntary movements  Attitude toward examiner:  LUH  Motor: No psychomotor agitation, retardation or abnormal movements noted. Speech:  LUH  Mood: LUH  Affect: LUH  Thought Production: LUH  Thought Processes:  LUH  Thought Content:  LUH  Cognition:  disoriented   Concentration: LUH  Memory: LUH  Insight:LUH  Judgement: [id number] of Knowledge: limited        Review of Systems:      PSYCHIATRIC: See HPI above. Neuro Exam:   Muscle Strength & Tone: LUH  Gait: LUH  Involuntary Movements: No    No notes of this type exist for this encounter. 9/3/2023     8:06 PM   C-SSRS Suicide Screening   1) Within the past month, have you wished you were dead or wished you could go to sleep and not wake up? No   2) Have you actually had any thoughts of killing yourself? No   6) Have you ever done anything, started to do anything, or prepared to do anything to end your life? No          Questionnaire Findings:    PHQ9: Unable to assess     GAD7:        No data to display                Problem List:   Hospital Problems             Last Modified POA    * (Principal) Severe dementia with psychotic disturbance ([address]) 9/11/2023 Yes    Benign prostatic hyperplasia with urinary retention 9/11/2023 Yes    Overview Addendum 9/27/2022 10:14 AM by Gill Guy MD     4-54-40           HTN (hypertension) (Chronic) 9/3/2023 Yes    Severe vascular dementia ([address]) (Chronic) 9/7/2023 Yes    Overview Signed 8/29/2023  2:15 PM by Gill Guy MD     \[facility] [place] [facility]\""F Neurology, poss lewy-body? Acute psychosis ([address]) 9/11/2023 Yes    Cognitive and behavioral changes 9/7/2023 Yes    Delirium 9/7/2023 Yes          ASSESSMENT  Psychiatric Diagnoses:  Delirium [R41.0 (ICD-10-CM)], Cognitive impairment [R41.89 (ICD-10-CM)], Cognitive and behavioral changes [R41.89, R46.89 (ICD-10-CM)], Depression, unspecified depression type [F32. A (ICD-10-CM)]      Legal Status: voluntary          Impression:     70 y.o. male with a history significant for anxiety, depression, dementia, dementia without behavioral disturbance, mild vascular dementia without behavioral disturbance, cognitive communication deficit, confusion with hallucinations. Pt has a PMH that includes:   sick sinus syndrome, HTN, GERD, pacemaker, BPH, hyperlipidemia, DJD, NIDDM. Plan to discontinue zyprexa, may be having paradoxical effect, retrial Risperdal as it can help with agitation. DC benzos and PRN anti-psychotics to limit interactions. Increase depakote pending levels.              Plan:    Unable to evaluate pt - r/t pt somnolent- PRN medications/ chart review completed / other information and hx obtained from RN, previous assessment   -Recommend adding geriatrician referral to [facility].  -Recommend referral to outpatient psychiatry for cognitive, depression, and anxiety management.  -Continue with neurology, [facility] November appt per sister - consider moving to sooner follow-up appt if possible.  -Eliminate polypharmacy and minimize medications as much as possible   -could add Depakote 500mg  TID as tolerates pending Depakote level - to target unstable mood - monitor for - drowsiness, GI upset, tremors, tiredness, low platelet count, constipation, mood swings, vision changes, agitation, involuntary eye movements  -Monitor for sedation/adverse effects, LFTs, recheck Depakote levels in 3-5 days.  -Continue retrial of Risperdal 1mg PO BID for agitation  -Could consider discontinuing  Zyprexa 10 mg PO twice a day , dc ativan, edilma klonopin   -Haldol 2 mg IM PRN  acute agitation only or Zyprexa IM 5mg limit use of additional anti-psychotics  -Pt does NOT meet criteria for IVC at this time.  -Discussed with MD and CM = in agreement with plan     Ensure consistency, communicate clearly and concisely, giving repeated verbal reminders of day, time, location and identity of care team, reorienting regularly, ensure adequate lighting during the day, control sources of excess noise, arrange treatments to allow for maximum periods of uninterrupted sleep, encourage self-care and participation in treatment  If possible, avoid use of opioids, benzodiazepines, and anticholinergic medications as these can worsen delirium / cognition impairment.      Encourage STR  - physical dysfunction can be a risk factor, symptom and functional consequence of delirium / therefore, structured and repetitive mobilization (ie PT/OT) can be important in helping to improve cognitive impairment (specifically executive functioning and verbal fluency)             AMALIA Pryor NP 9/12/2023 [address]

## 2023-09-12 NOTE — PROGRESS NOTES
PT was in bed with Sister, Gladys De, and Sitter at bedside. PT appeared to be resting. Sister updated [id number] and asked for prayer.  offered prayer and support. . Rashaad Thomas M.Div.

## 2023-09-12 NOTE — PROGRESS NOTES
Comprehensive Nutrition Assessment    Type and Reason for Visit: Reassess  Malnutrition Screening Tool: Malnutrition Screen  Have you recently lost weight without trying?: Unsure of amount of weight loss (2 points)  Have you been eating poorly because of a decreased appetite?: No (0 points)  Malnutrition Screening Tool Score: 2    Nutrition Recommendations/Plan:   Meals and Snacks:  Diet: Continue current order  Nutrition Supplement Therapy:  Medical food supplement therapy:  Initiate Ensure Enlive three times per day (this provides 350 kcal and 20 grams protein per bottle). Prefers chocolate       Malnutrition Assessment:9/5  Malnutrition Status: At risk for malnutrition (Comment) (poor PO intake with changes in mentation)    NFPE deffered 2/2 sedated and combative dementia. No visual wasting noted. Nutrition Assessment:  Nutrition History: 9/5: Pt unable to provide nutrition hx 2/2 dementia. JERICHO Muñoz provided nutrition hx to RD. Pt previously lives alone at home. States that family was brining patient meals daily 2/2 worsening dementia/confusion as he was forgetting to eat at times. Eats well when has food provided. Weight hx shows overall stable/increasing weight: 9/27/22 189 lbs, 12/14/22: 189 lbs, CBW of 196 lbs (bed weight- 9/5/23). 9/12: Per sister, Bakari, pt tries to eat healthy/more Mediterranean. B-oatmeal and blueberry, L-turkey wrap, D-chicken or salmon, vegetables. She does not that he has a preference for sweets  and saw him eating cocoa puffs but he really likes anything sweet. .       Nutrition Background:       H/O: early onset dementia, anxiety, depression , GERD, HLH, HTN, BPH, SSS/pacemaker, CPH, diet controlled DM. Presented with 2 weeks of hallucinations. Admitted with worsening confusion and increased agitation. Admitted d/t increasing agitation and combativeness and family unable to care for him in current condition. Likely progressive dementia with psychotic disturbances. . Pt has required 4 point restraints, which he broke,and then was started on precedex which was stopped on 9/9  Nutrition Interval:  9/9: Per RN note-not following commands sufficiently to swallow safely at this time. 9/10: Per MD-Had aspiration event on 9-10.  nothing on CXR but findings lag behind clinical events. Started on unasyn and SLP consulted. 9/10: SLP eval-\"Patient presents with mentation as greatest barrier to safe PO intake. Easily distracted and talking with PO in oral cavity at times. Increased mastication time with solids, but adequate oral clearance. No overt s/sx with any/all consistencies. \"MM5 recommneded  9/12: SLP [id number] MM5 recommended    Current meds: Depakote, aricept, PRN haldol and ativan, namenda, rsiperadal, zoloft  Discussed with Jones Francis RN. Continues with aggressive and combative behavior at times but has been calm thus far today. Jones Francis had pt 2 days last week as well as yesterday. She reports that last week he ate no breakfast, 30% of lunch and good dinner one day and the other day he ate all of B, a good lunch and not much at dinner. Yesterday he did not eat any meals d/t too lethargic. This morning he ate almost all of breakfast. Sge has not seen any Ensure on his meal trays. Pt seen sitting in bed with eyes closed. Sitter and sister, Bakari at bedside. Bakari called their other 2 sister to gather information on foods he likes. Unfortunately typical foods he likes and eat ar not all c/w MM5 diet. Sister rpeorts he likes and accpeted a chcolate Ensure one day last week. Current Nutrition Therapies:  ADULT DIET;  Dysphagia - Minced and Moist    Current Intake:   Average Meal Intake: 26-50% (For 3 reorded meals in past 1 week) Average Supplements Intake: None Ordered      Anthropometric Measures:  Height: 6' 3\" (190.5 cm)  Current Body Wt: 194 lb 10.7 oz (88.3 kg) (9/12), Weight source: Bed Scale  BMI: 24.3, Normal Weight (BMI 22.0 to 24.9) age over 72  Admission Body Weight: 196

## 2023-09-12 NOTE — PROGRESS NOTES
Hospitalist Progress Note   Admit Date:  9/3/2023  8:00 PM   Name:  Melissa Phillip III   Age:  70 y.o. Sex:  male  :  1955   MRN:  [id number]   Room:  [facility]/    Presenting/Chief Complaint: No chief complaint on file. Reason(s) for Admission: Delirium [R41.0]  Hospital admission due to social situation [Z60.9]  Severe dementia with psychotic disturbance, unspecified dementia type ([facility]) [F03. C2]  Acute psychosis [facility] [address] Course:   Melissa Phillip III is a 70 y.o. male with medical history of early onset dementia, hypertension, diet-controlled diabetes admitted with worsening confusion and hallucination likely in the setting of progressive dementia. Patient became increasingly agitated and required antipsychotic medication and four-point restraints. Transferred to ICU for Precedex drip. Lab workup and CT head no evidence of any abnormalities to explain behavior. Zyprexa was ineffective so this was stopped. Pt was started on seroquel and this also did not seem to work at 100mg doses. Depakote added. Seroquel switched to risperdal . Then switched to higher dose of zyprexa, then back to rispderal.  Numerous different medication trials and combinations attempted. Had aspiration event on 9-10.  nothing on CXR but findings lag behind clinical events. Started on unasyn and SLP consulted. Subjective & 24hr Events:   Still in restraints but did not need ativan or haldol overnight per nursing. Pt is responsive but still confused. Disagrees with me when I tell him it's morning. No more fevers. Discussed with nursing and IDT      Assessment & Plan:         Acute psychosis with severe dementia ([address] [facility])  -psych following, appreciate  -also has to remain out of restraints 24 hours to be discharged  -depakote level was 77 (on BID dosing). Psych recommended increasing to TID dosing. Repeat depakote level soon  -cont rispderal 1mg BID.   If too sleepy during day can change

## 2023-09-12 NOTE — CARE COORDINATION
RN CM met with the patient and his sister Larisa at the bedside. She requested for case management to send a referral to [facility]. RN CM informed her that a referral was previously sent to [facility] but they were unable to accept the patient for services. She requested for RN CM to call and speak with Amie Medley, the admission liaison at [facility]. JERICHO CRAIG called Amie Medley and left a HIPPA compliant voicemail requesting a return phone call.

## 2023-09-12 NOTE — PROGRESS NOTES
LTG: patient will tolerate safest, least restrictive oral diet without s/sx airway compromise  STG: Patient will tolerate minced and moist diet and thin liquids without overt s/sx aspiration  STG: Patient will tolerate ongoing po trials in efforts to advance diet  STG: Patient will participate in instrumental swallowing assessment to objectively assess oropharyngeal swallow if clinically indicated       SPEECH LANGUAGE PATHOLOGY: DYSPHAGIA  Daily Note #1    NAME: Tye Florez III  : 1955  MRN: [id number]    ADMISSION DATE: 9/3/2023  PRIMARY DIAGNOSIS: Severe dementia with psychotic disturbance ([address])  Delirium [R41.0]  Hospital admission due to social situation [Z60.9]  Severe dementia with psychotic disturbance, unspecified dementia type ([address]) [F03. C2]  Acute psychosis ([address]) [F23]    ICD-10: Treatment Diagnosis: R13.12 Dysphagia, Oropharyngeal Phase    RECOMMENDATIONS   Diet:  Diet Solids Recommendation: Minced & Moist  Liquid Consistency Recommendation: Thin    Medications: Crushed in puree as able     Compensatory Swallowing Strategies: Alternate solids and liquids;Small bites/sips; Remain upright for 30-45 minutes after meals;Upright as possible for all oral intake;Eat/Feed slowly   Recommendations: Dysphagia treatment;Supervision with meals;Assistance with meals    Therapeutic Interventions: Diet tolerance monitoring;Patient/Family education   Patient continues to require skilled intervention:  Yes. Recommend ongoing speech therapy services during this hospitalization      ASSESSMENT       Patient is awake, but confused with limited verbalizations. Actively accepting po trials. Min-mod verbal/tactile cues required to masticate chewables. Adequate oral clearing with soft chewables. No overt s/sx airway compromise observed. Recommend continue minced/moist diet and thin liquids. Meds crushed in puree/applesauce. Anticipate patient's diet can be advanced pending improvement in mentation.    GENERAL Dysphagia Outcome and Severity Scale (PHU)  Dysphagia Outcome Severity Scale: Level 4: Mild moderate dysphagia- Intermittent supervision/cueing. One - two diet consistencies restricted  Interpretation of Tool: The Dysphagia Outcome and Severity Scale (PHU) is a simple, easy-to-use, 7-point scale developed to systematically rate the functional severity of dysphagia based on objective assessment and make recommendations for diet level, independence level, and type of nutrition.    Normal(7), Functional(6), Mild(5), Mild-Moderate(4), Moderate(3), Moderate-Severe(2), Severe(1)    PLAN    Duration/Frequency: Continue to follow patient 3x/week for duration of hospitalization and/or until goals met    Education:    Patient Education: SLP role; diet recommendations  Patient Education Response: Needs reinforcement, No evidence of learning    PRECAUTIONS/ALLERGIES: Ace inhibitors        Therapy Time  SLP Individual Minutes  Time In: 4588  Time Out: 1685  Minutes: HUY Jones  9/12/2023 11:08 AM

## 2023-09-12 NOTE — PROGRESS NOTES
Physical therapy - spoke with RN this am who recommended I try back this afternoon when pt. More responsive. Will try later if time allows.

## 2023-09-13 PROCEDURE — 6360000002 HC RX W HCPCS: Performed by: INTERNAL MEDICINE

## 2023-09-13 PROCEDURE — 6370000000 HC RX 637 (ALT 250 FOR IP): Performed by: FAMILY MEDICINE

## 2023-09-13 PROCEDURE — 6360000002 HC RX W HCPCS: Performed by: HOSPITALIST

## 2023-09-13 PROCEDURE — 2000000000 HC ICU R&B

## 2023-09-13 PROCEDURE — 6360000002 HC RX W HCPCS: Performed by: FAMILY MEDICINE

## 2023-09-13 PROCEDURE — 2580000003 HC RX 258: Performed by: INTERNAL MEDICINE

## 2023-09-13 PROCEDURE — 92526 ORAL FUNCTION THERAPY: CPT

## 2023-09-13 PROCEDURE — 6370000000 HC RX 637 (ALT 250 FOR IP): Performed by: INTERNAL MEDICINE

## 2023-09-13 PROCEDURE — 2580000003 HC RX 258: Performed by: FAMILY MEDICINE

## 2023-09-13 PROCEDURE — 97530 THERAPEUTIC ACTIVITIES: CPT

## 2023-09-13 RX ORDER — POTASSIUM CHLORIDE 7.45 MG/ML
10 INJECTION INTRAVENOUS
Status: COMPLETED | OUTPATIENT
Start: 2023-09-13 | End: 2023-09-13

## 2023-09-13 RX ADMIN — AMPICILLIN SODIUM AND SULBACTAM SODIUM 3000 MG: 2; 1 INJECTION, POWDER, FOR SOLUTION INTRAMUSCULAR; INTRAVENOUS at 16:28

## 2023-09-13 RX ADMIN — POTASSIUM CHLORIDE 10 MEQ: 7.46 INJECTION, SOLUTION INTRAVENOUS at 09:29

## 2023-09-13 RX ADMIN — MEMANTINE 10 MG: 5 TABLET ORAL at 20:38

## 2023-09-13 RX ADMIN — AMPICILLIN SODIUM AND SULBACTAM SODIUM 3000 MG: 2; 1 INJECTION, POWDER, FOR SOLUTION INTRAMUSCULAR; INTRAVENOUS at 20:28

## 2023-09-13 RX ADMIN — ATORVASTATIN CALCIUM 10 MG: 10 TABLET, FILM COATED ORAL at 08:19

## 2023-09-13 RX ADMIN — DIVALPROEX SODIUM 500 MG: 125 CAPSULE, COATED PELLETS ORAL at 16:28

## 2023-09-13 RX ADMIN — SERTRALINE 150 MG: 100 TABLET, FILM COATED ORAL at 08:52

## 2023-09-13 RX ADMIN — DIVALPROEX SODIUM 500 MG: 125 CAPSULE, COATED PELLETS ORAL at 05:34

## 2023-09-13 RX ADMIN — Medication 3 MG: at 20:39

## 2023-09-13 RX ADMIN — ASPIRIN 81 MG 81 MG: 81 TABLET ORAL at 08:19

## 2023-09-13 RX ADMIN — SODIUM CHLORIDE, PRESERVATIVE FREE 10 ML: 5 INJECTION INTRAVENOUS at 08:53

## 2023-09-13 RX ADMIN — LOSARTAN POTASSIUM 50 MG: 50 TABLET, FILM COATED ORAL at 08:54

## 2023-09-13 RX ADMIN — AMPICILLIN SODIUM AND SULBACTAM SODIUM 3000 MG: 2; 1 INJECTION, POWDER, FOR SOLUTION INTRAMUSCULAR; INTRAVENOUS at 02:23

## 2023-09-13 RX ADMIN — POTASSIUM CHLORIDE 10 MEQ: 7.46 INJECTION, SOLUTION INTRAVENOUS at 08:20

## 2023-09-13 RX ADMIN — HALOPERIDOL LACTATE 2 MG: 5 INJECTION, SOLUTION INTRAMUSCULAR at 22:25

## 2023-09-13 RX ADMIN — ENOXAPARIN SODIUM 40 MG: 100 INJECTION SUBCUTANEOUS at 08:20

## 2023-09-13 RX ADMIN — DIVALPROEX SODIUM 500 MG: 125 CAPSULE, COATED PELLETS ORAL at 22:25

## 2023-09-13 RX ADMIN — SODIUM CHLORIDE, PRESERVATIVE FREE 10 ML: 5 INJECTION INTRAVENOUS at 21:36

## 2023-09-13 RX ADMIN — TAMSULOSIN HYDROCHLORIDE 0.4 MG: 0.4 CAPSULE ORAL at 08:53

## 2023-09-13 RX ADMIN — RISPERIDONE 1 MG: 1 TABLET ORAL at 20:38

## 2023-09-13 RX ADMIN — AMPICILLIN SODIUM AND SULBACTAM SODIUM 3000 MG: 2; 1 INJECTION, POWDER, FOR SOLUTION INTRAMUSCULAR; INTRAVENOUS at 08:20

## 2023-09-13 RX ADMIN — POTASSIUM CHLORIDE 10 MEQ: 7.46 INJECTION, SOLUTION INTRAVENOUS at 10:26

## 2023-09-13 RX ADMIN — RISPERIDONE 1 MG: 1 TABLET ORAL at 08:53

## 2023-09-13 RX ADMIN — MEMANTINE 10 MG: 5 TABLET ORAL at 08:19

## 2023-09-13 RX ADMIN — DONEPEZIL HYDROCHLORIDE 10 MG: 5 TABLET, FILM COATED ORAL at 20:38

## 2023-09-13 ASSESSMENT — PAIN SCALES - GENERAL
PAINLEVEL_OUTOF10: 0
PAINLEVEL_OUTOF10: 0

## 2023-09-13 NOTE — PROGRESS NOTES
LTG: patient will tolerate safest, least restrictive oral diet without s/sx airway compromise  STG: Patient will tolerate  soft/bite sized diet and thin liquids without overt s/sx aspiration. Advanced   STG: Patient will tolerate ongoing po trials in efforts to advance diet. Progressing   STG: Patient will participate in instrumental swallowing assessment to objectively assess oropharyngeal swallow if clinically indicated       SPEECH LANGUAGE PATHOLOGY: DYSPHAGIA  Daily Note #2    NAME: Isra Rolle III  : 1955  MRN: [id number]    ADMISSION DATE: 9/3/2023  PRIMARY DIAGNOSIS: Severe dementia with psychotic disturbance ([address])  Delirium [R41.0]  Hospital admission due to social situation [Z60.9]  Severe dementia with psychotic disturbance, unspecified dementia type ([address]) [F03. C2]  Acute psychosis ([address]) [F23]    ICD-10: Treatment Diagnosis: R13.12 Dysphagia, Oropharyngeal Phase    RECOMMENDATIONS   Diet:  Diet Solids Recommendation: Soft & Bite Sized  Liquid Consistency Recommendation: Thin    Medications: Crushed in puree as able     Compensatory Swallowing Strategies: Alternate solids and liquids;Small bites/sips; Remain upright for 30-45 minutes after meals;Upright as possible for all oral intake;Eat/Feed slowly; Assist feed   Recommendations: Dysphagia treatment;Supervision with meals;Assistance with meals    Therapeutic Interventions: Diet tolerance monitoring;Patient/Family education   Patient continues to require skilled intervention:  Yes. Recommend ongoing speech therapy services during this hospitalization      ASSESSMENT       Patient more alert and interactive today; however, he remains confused with slurred speech. Minimal verbal cues required to hold conversation during bolus prep. Slowed mastication of solids, but adequate oral clearing achieved with increased time. No pharyngeal dysphagia symptoms identified.  Patient remains at increased risk for aspiration due altered mental status with fluctuating levels of alertness. Needs 1:1 assist with po intake to ensure use of safe swallowing precautions. Recommend advance to soft/bite sized diet. Continue thin liquids. Meds crushed in puree/applesauce. 1:1 assist with meals. Anticipate patient's diet can be further advanced pending improvement in mentation. GENERAL    History of Present Injury/Illness: Mr. Brent Sanders  has a past medical history of Anxiety, BPH (benign prostatic hyperplasia), BPH (benign prostatic hypertrophy), Chest pain, Chronic prostatitis, Depression, Deviated nasal septum, DJD (degenerative joint disease), lumbosacral, Dyslipidemia, Dyspnea, Family history of coronary artery bypass surgery, GERD (gastroesophageal reflux disease), History of kidney stones, HTN (hypertension), Hypercalcemia, Hyperhomocysteinemia ([address]), Hyperlipidemia, Hypertension, Hypertriglyceridemia, Pacemaker, Pre-diabetes, Sick sinus syndrome ([address]), Syncope and collapse, Tubular adenoma, Tubular adenoma of colon, and Vitamin D deficiency. Eileen Costello He also  has a past surgical history that includes Colonoscopy (2010); orthopedic surgery; orthopedic surgery (1990); pacemaker (6/1/2009); and lipoma resection. Subjective: Upright in chair in wrist restraints. Alert and conversant. Speech is slurred and nonsensical. Following some 1 step commands. Sister at bedside. Behavior/Cognition: Alert; Cooperative;Confused  Follows Directions: Simple       Prior Dysphagia History: per family, baseline throat clearing \"his whole life\"; nursing reports strong choking episode with medication prompted consult   Prior instrumental assessment: n/a    Current Diet : Minced and Moist  Current Liquid Diet : Thin       Respiratory Status: Room air    O2 Device: None (Room air)        Pain: Patient does not c/o pain, Patient does not appear in pain                  OBJECTIVE           Patient Positioning: Upright in chair      Dentition: Adequate        Baseline Vocal Quality:

## 2023-09-13 NOTE — PROGRESS NOTES
PT was awake in Chair with Sister and Sitter at bedside. [phone] [facility] introduced self. [address] [facility] let PT know [facility] had been praying for PT. Family and PT expressed gladness that PT was starting to feel better. PT expressed importance of and comfort in [place] and Prayer. PT is [facility] offered prayer. [id number] [facility] checked for unmet needs and offered support. Rev. Ted Rausch M.Div.

## 2023-09-13 NOTE — PLAN OF CARE
Problem: Discharge Planning  Goal: Discharge to home or other facility with appropriate resources  Outcome: Progressing  Flowsheets (Taken 9/13/2023 0800)  Discharge to home or other facility with appropriate resources: Identify barriers to discharge with patient and caregiver     Problem: Pain  Goal: Verbalizes/displays adequate comfort level or baseline comfort level  Outcome: Progressing     Problem: Safety - Adult  Goal: Free from fall injury  Outcome: Progressing  Flowsheets (Taken 9/13/2023 0800)  Free From Fall Injury: Instruct family/caregiver on patient safety     Problem: Safety - Medical Restraint  Goal: Remains free of injury from restraints (Restraint for Interference with Medical Device)  Description: INTERVENTIONS:  1. Determine that other, less restrictive measures have been tried or would not be effective before applying the restraint  2. Evaluate the patient's condition at the time of restraint application  3. Inform patient/family regarding the reason for restraint  4.  Q2H: Monitor safety, psychosocial status, comfort, nutrition and hydration  Outcome: Progressing  Flowsheets  Taken 9/13/2023 0800 by Ric Villalobos RN  Remains free of injury from restraints (restraint for interference with medical device): Determine that other, less restrictive measures have been tried or would not be effective before applying the restraint  Taken 9/13/2023 0400 by Brice Cadet RN  Remains free of injury from restraints (restraint for interference with medical device): Determine that other, less restrictive measures have been tried or would not be effective before applying the restraint  Taken 9/13/2023 0200 by Brice Cadet RN  Remains free of injury from restraints (restraint for interference with medical device): Determine that other, less restrictive measures have been tried or would not be effective before applying the restraint  Taken 9/13/2023 0000 by Brice Cadet RN  Remains free of injury from

## 2023-09-13 NOTE — PROGRESS NOTES
Hospitalist Progress Note   Admit Date:  9/3/2023  8:00 PM   Name:  Beatrice Del Cid III   Age:  70 y.o. Sex:  male  :  1955   MRN:  [id number]   Room:  [facility]/    Presenting/Chief Complaint: No chief complaint on file. Reason(s) for Admission: Delirium [R41.0]  Hospital admission due to social situation [Z60.9]  Severe dementia with psychotic disturbance, unspecified dementia type ([address] [facility]) [F03. C2]  Acute psychosis [facility]) [address] Course:   Beatrice Del Cid III is a 70 y.o. male with medical history of early onset dementia, hypertension, diet-controlled diabetes admitted with worsening confusion and hallucination likely in the setting of progressive dementia. Patient became increasingly agitated and required antipsychotic medication and four-point restraints. Transferred to ICU for Precedex drip. Lab workup and CT head no evidence of any abnormalities to explain behavior. Zyprexa was ineffective so this was stopped. Pt was started on seroquel and this also did not seem to work at 100mg doses. Depakote added. Seroquel switched to risperdal . Then switched to higher dose of zyprexa, then back to rispderal.  Numerous different medication trials and combinations attempted. Had aspiration event on 9-10.  nothing on CXR but findings lag behind clinical events. Started on unasyn and SLP consulted. Subjective & 24hr Events:   Patient seen at bedside, resting in bed. He is alert and awake but appears encephalopathic/confused and continues to mumble things that does not make sense. He continues to be in two-point restraints. No overnight events of agitation or restlessness. Sitter present at bedside. No reported fever, vomiting or diarrhea. ROS:  10 point review system is limited due to patient's clinical condition and dementia.       Assessment & Plan:         Acute psychosis with severe dementia [facility])  -psych following, appreciate  -also has to remain out of restraints 24 bisacodyl (DULCOLAX) EC tablet 5 mg  5 mg Oral Daily PRN    aluminum & magnesium hydroxide-simethicone (MAALOX) 200-200-20 MG/5ML suspension 30 mL  30 mL Oral Q6H PRN    acetaminophen (TYLENOL) tablet 650 mg  650 mg Oral Q6H PRN    Or    acetaminophen (TYLENOL) suppository 650 mg  650 mg Rectal Q6H PRN       Signed:  Richard Rodriguez MD    Part of this note may have been written by using a voice dictation software. The note has been proof read but may still contain some grammatical/other typographical errors.

## 2023-09-14 LAB — VALPROATE SERPL-MCNC: 82 UG/ML (ref 50–100)

## 2023-09-14 PROCEDURE — 6370000000 HC RX 637 (ALT 250 FOR IP): Performed by: FAMILY MEDICINE

## 2023-09-14 PROCEDURE — 36415 COLL VENOUS BLD VENIPUNCTURE: CPT

## 2023-09-14 PROCEDURE — 2000000000 HC ICU R&B

## 2023-09-14 PROCEDURE — 97535 SELF CARE MNGMENT TRAINING: CPT

## 2023-09-14 PROCEDURE — 6360000002 HC RX W HCPCS: Performed by: INTERNAL MEDICINE

## 2023-09-14 PROCEDURE — 2580000003 HC RX 258: Performed by: INTERNAL MEDICINE

## 2023-09-14 PROCEDURE — 80164 ASSAY DIPROPYLACETIC ACD TOT: CPT

## 2023-09-14 PROCEDURE — 6360000002 HC RX W HCPCS: Performed by: FAMILY MEDICINE

## 2023-09-14 PROCEDURE — 97530 THERAPEUTIC ACTIVITIES: CPT

## 2023-09-14 PROCEDURE — 6370000000 HC RX 637 (ALT 250 FOR IP): Performed by: INTERNAL MEDICINE

## 2023-09-14 PROCEDURE — 2580000003 HC RX 258: Performed by: FAMILY MEDICINE

## 2023-09-14 RX ADMIN — MEMANTINE 10 MG: 5 TABLET ORAL at 10:09

## 2023-09-14 RX ADMIN — LOSARTAN POTASSIUM 50 MG: 50 TABLET, FILM COATED ORAL at 10:08

## 2023-09-14 RX ADMIN — AMPICILLIN SODIUM AND SULBACTAM SODIUM 3000 MG: 2; 1 INJECTION, POWDER, FOR SOLUTION INTRAMUSCULAR; INTRAVENOUS at 15:41

## 2023-09-14 RX ADMIN — AMPICILLIN SODIUM AND SULBACTAM SODIUM 3000 MG: 2; 1 INJECTION, POWDER, FOR SOLUTION INTRAMUSCULAR; INTRAVENOUS at 09:28

## 2023-09-14 RX ADMIN — SERTRALINE 150 MG: 100 TABLET, FILM COATED ORAL at 10:08

## 2023-09-14 RX ADMIN — Medication 3 MG: at 20:17

## 2023-09-14 RX ADMIN — ATORVASTATIN CALCIUM 10 MG: 10 TABLET, FILM COATED ORAL at 10:08

## 2023-09-14 RX ADMIN — AMPICILLIN SODIUM AND SULBACTAM SODIUM 3000 MG: 2; 1 INJECTION, POWDER, FOR SOLUTION INTRAMUSCULAR; INTRAVENOUS at 20:19

## 2023-09-14 RX ADMIN — TAMSULOSIN HYDROCHLORIDE 0.4 MG: 0.4 CAPSULE ORAL at 10:09

## 2023-09-14 RX ADMIN — AMPICILLIN SODIUM AND SULBACTAM SODIUM 3000 MG: 2; 1 INJECTION, POWDER, FOR SOLUTION INTRAMUSCULAR; INTRAVENOUS at 02:13

## 2023-09-14 RX ADMIN — RISPERIDONE 1 MG: 1 TABLET ORAL at 10:09

## 2023-09-14 RX ADMIN — DIVALPROEX SODIUM 500 MG: 125 CAPSULE, COATED PELLETS ORAL at 10:23

## 2023-09-14 RX ADMIN — MEMANTINE 10 MG: 5 TABLET ORAL at 20:16

## 2023-09-14 RX ADMIN — SODIUM CHLORIDE: 9 INJECTION, SOLUTION INTRAVENOUS at 15:42

## 2023-09-14 RX ADMIN — DONEPEZIL HYDROCHLORIDE 10 MG: 5 TABLET, FILM COATED ORAL at 20:17

## 2023-09-14 RX ADMIN — DIVALPROEX SODIUM 500 MG: 125 CAPSULE, COATED PELLETS ORAL at 17:59

## 2023-09-14 RX ADMIN — SODIUM CHLORIDE, PRESERVATIVE FREE 10 ML: 5 INJECTION INTRAVENOUS at 20:18

## 2023-09-14 RX ADMIN — ENOXAPARIN SODIUM 40 MG: 100 INJECTION SUBCUTANEOUS at 10:07

## 2023-09-14 RX ADMIN — RISPERIDONE 1 MG: 1 TABLET ORAL at 20:17

## 2023-09-14 RX ADMIN — ASPIRIN 81 MG 81 MG: 81 TABLET ORAL at 10:09

## 2023-09-14 ASSESSMENT — PAIN SCALES - GENERAL
PAINLEVEL_OUTOF10: 0

## 2023-09-14 NOTE — PROGRESS NOTES
ACUTE OCCUPATIONAL THERAPY GOALS:   (Developed with and agreed upon by patient and/or caregiver.)  1. Patient will perform grooming with min assist.-GOAL MET 9/10/23    2. Patient will perform upper body dressing with min assist. -GOAL MET 9/10/23    3. Patient will perform lower body dressing with min assist.  4. Patient will perform bathing with min assist.  5. Patient will perform toileting and toilet transfer with min assist.  6. Patient will perform ADL functional mobility and tranfers in room with min assist.  PROGRESSING 9/14/23   7. Patient to follow simple directions with mod verbal and tactile cues. -GOAL MET 9/14/23      Goals to be achieved in 7 days. OCCUPATIONAL THERAPY Daily Note and AM       OT Visit Days: 5  Acknowledge Orders  Time  OT Charge Capture  Rehab Caseload Tracker      Kamran Mansfield is a 70 y.o. male   PRIMARY DIAGNOSIS: Severe dementia with psychotic disturbance ([address])  Delirium [R41.0]  Hospital admission due to social situation [Z60.9]  Severe dementia with psychotic disturbance, unspecified dementia type ([address]) [F03. C2]  Acute psychosis ([address]) [F23]       Reason for Referral: Other lack of cordination (R27.8)  Difficulty in walking, Not elsewhere classified (R26.2)  Other abnormalities of gait and mobility (R26.89)  Inpatient: Payor: MEDICARE / Plan: MEDICARE PART A AND B / Product Type: *No Product type* /     ASSESSMENT:     REHAB RECOMMENDATIONS:   Recommendation to date pending progress:  Setting:  Short-term Rehab    Equipment:    To Be Determined     ASSESSMENT:  Mr. Kathy Schulz presents supine in bed with sister and sitter at bedside. Restraints not attached. Pt easier to arouse today and was better able to participate in session. He performed supine to sit with SBA and was able to maintain sitting balance at EOB with SPV and occasional cues. At EOB, he was able to comb hair and wash face with verbal cues.  He performed sit<>stand with min-mod A and required mod A for Comments)   Orientation []  Oriented to self   Vision []     Hearing []     Cognition  []  Follows simple commands moderately with verbal, tactile and manual cues   Perception []       MOBILITY: I Mod I S SBA CGA Min Mod Max Total  NT x2 Comments:   Bed Mobility    Rolling [] [] [] [] [] [] [] [] [] [] []    Supine to Sit [] [] [] [x] [] [] [] [] [] [] []    Scooting [] [] [] [x] [] [] [] [] [] [] []    Sit to Supine [] [] [] [] [] [] [] [] [] [] []    Transfers    Sit to Stand [] [] [] [] [] [x] [x] [] [] [] [x]    Bed to Chair [] [] [] [] [] [x] [x] [] [] [] [x] RW   Stand to Sit [] [] [] [] [] [x] [x] [] [] [] [x]    Tub/Shower [] [] [] [] [] [] [] [] [] [] []     Toilet [] [] [] [] [] [] [] [] [] [] []      [] [] [] [] [] [] [] [] [] [] []    I=Independent, Mod I=Modified Independent, S=Supervision/Setup, SBA=Standby Assistance, CGA=Contact Guard Assistance, Min=Minimal Assistance, Mod=Moderate Assistance, Max=Maximal Assistance, Total=Total Assistance, NT=Not Tested    ACTIVITIES OF DAILY LIVING: I Mod I S SBA CGA Min Mod Max Total NT Comments   BASIC ADLs:              Upper Body Bathing  [] [] [] [] [] [] [] [] [x] []     Lower Body Bathing [] [] [] [] [] [] [] [] [x] []     Toileting [] [] [] [] [] [] [] [] [x] [] gaffney catheter   Upper Body Dressing [] [] [] [] [] [x] [] [] [] [] gown   Lower Body Dressing [] [] [] [] [] [x] [] [] [] [] Genevieve Born shoes   Feeding [] [] [] [] [] [] [] [] [] []    Grooming [] [] [x] [] [] [] [] [] [] [] Washed face and combed hair while seated edge of bed    Personal Device Care [] [] [] [] [] [] [] [] [] []    Functional Mobility [] [] [] [] [] [x] [] [] [] []  with rolling walker    I=Independent, Mod I=Modified Independent, S=Supervision/Setup, SBA=Standby Assistance, CGA=Contact Guard Assistance, Min=Minimal Assistance, Mod=Moderate Assistance, Max=Maximal Assistance, Total=Total Assistance, NT=Not Tested    PLAN:   FREQUENCY/DURATION     for duration of hospital stay or until

## 2023-09-14 NOTE — PROGRESS NOTES
[id number] [address] attempted to visit PT, but PT was asleep. [id number] [address] prayed silently for PT, PT's Family, and Staff. Rev. Tejas Love, M.DivHugh Yun

## 2023-09-14 NOTE — PROGRESS NOTES
ACUTE PHYSICAL THERAPY GOALS:   (Developed with and agreed upon by patient and/or caregiver.)  STG:  (1.)Mr. Colin Briceño will move from supine to sit and sit to supine  with MINIMAL ASSIST within 4-7 treatment day(s). -GOAL MET 9/8/23     (2.)Mr. Colin Briceño will transfer from bed to chair and chair to bed with MINIMAL ASSIST using the least restrictive device within 4-7 treatment day(s). Met 9/10  (3.)Mr. Colin Briceño will ambulate with MINIMAL ASSIST for 75 feet with the least restrictive device within 4-7 treatment day(s). Met 9/10    GOALS MODIFIED 9/10/23 :  1) Bed mobility with SBA, flat bed no rail. 2) transfers with walker & CGA. 3) pt ambulating 200-250ft with rolling walker & CGA    _______________________________________________________________________________________________     PHYSICAL THERAPY Daily Note and AM  (Link to Caseload Tracking: PT Visit Days : 3  Acknowledge Orders  Time In/Out  PT Charge Capture  Rehab Caseload Tracker    Anjana Hurt is a 70 y.o. male   PRIMARY DIAGNOSIS: Severe dementia with psychotic disturbance ([address])  Delirium [R41.0]  Hospital admission due to social situation [Z60.9]  Severe dementia with psychotic disturbance, unspecified dementia type ([address]) [F03. C2]  Acute psychosis ([address]) [F23]       Reason for Referral: Generalized Muscle Weakness (M62.81)  Difficulty in walking, Not elsewhere classified (R26.2)  Inpatient: Payor: Savanah Brizuela / Plan: MEDICARE PART A AND B / Product Type: *No Product type* /     ASSESSMENT:     REHAB RECOMMENDATIONS:   Recommendation to date pending progress:  Setting:  Short-term Rehab    Equipment:    To Be Determined     ASSESSMENT:  Mr. Colin Briceño presented as lethargic , constant cues needed to keep eyes open throughout session, speech was mostly not understandable, but pt seemed to follow simple 1 step directions well. Pt showed increased gait distance & required less assist for transfers & gait.  This pt will need additional in-pt rehab at a SNF on

## 2023-09-14 NOTE — CARE COORDINATION
1402: RN CM met with the patient's two sisters at the bedside and discussed discharge planning. The patient was sleeping. They requested for case management to send additional referrals to facilities in surrounding counties. They expressed an interest in [facility]. Additional referrals were sent and are pending review. The patient's sisters stated they have been speaking with [facility] for possible assisted living placement. 1530: Message received that the patient's sister Starr Small is requesting to speak with case management. RN CM returned her phone call. She inquired about assisted living and short term rehabilitation. RN CM discussed the patient's bed offer at [address] [place] [address]. She stated she is going to speak with [facility] regarding a possible admission into assisted living. RN CM discussed home health services at an assisted living facility and informed her that the patient will need to meet certain criteria before discharging to an assisted living facility. She is still considering short term rehabilitation. The patient will need to be restraint free prior to discharging to a skilled nursing facility.

## 2023-09-14 NOTE — PROGRESS NOTES
Hospitalist Progress Note   Admit Date:  9/3/2023  8:00 PM   Name:  Amy Olsen III   Age:  70 y.o. Sex:  male  :  1955   MRN:  [id number]   Room:  [facility]/01    Presenting/Chief Complaint: No chief complaint on file. Reason(s) for Admission: Delirium [R41.0]  Hospital admission due to social situation [Z60.9]  Severe dementia with psychotic disturbance, unspecified dementia type ([address] [facility]) [F03. C2]  Acute psychosis [facility]) [address] Course:   Amy Olsen III is a 70 y.o. male with medical history of early onset dementia, hypertension, diet-controlled diabetes admitted with worsening confusion and hallucination likely in the setting of progressive dementia. Patient became increasingly agitated and required antipsychotic medication and four-point restraints. Transferred to ICU for Precedex drip. Lab workup and CT head no evidence of any abnormalities to explain behavior. Zyprexa was ineffective so this was stopped. Pt was started on seroquel and this also did not seem to work at 100mg doses. Depakote added. Seroquel switched to risperdal . Then switched to higher dose of zyprexa, then back to rispderal.  Numerous different medication trials and combinations attempted. Had aspiration event on 9-10.  nothing on CXR but findings lag behind clinical events. Started on unasyn and SLP consulted. Subjective & 24hr Events:   Patient seen at bedside, resting in bed. He is currently sleeping, difficult to arouse. On room air. As per nursing staff, patient received one-time dose of IV Haldol last night. Continues to be in two-point restraints. No other overnight events, no reported fever, vomiting, diarrhea, agitation or combative behavior. ROS:  10 point review system is limited due to patient's clinical condition and dementia.       Assessment & Plan:         Acute psychosis with severe dementia [facility])  -psych following, appreciate  -also has to remain out of restraints Oral Nightly    memantine (NAMENDA) tablet 10 mg  10 mg Oral BID    losartan (COZAAR) tablet 50 mg  50 mg Oral Daily    sertraline (ZOLOFT) tablet 150 mg  150 mg Oral Daily    atorvastatin (LIPITOR) tablet 10 mg  10 mg Oral Daily    glucose chewable tablet 16 g  4 tablet Oral PRN    dextrose bolus 10% 125 mL  125 mL IntraVENous PRN    Or    dextrose bolus 10% 250 mL  250 mL IntraVENous PRN    Glucagon Emergency KIT 1 mg  1 mg SubCUTAneous PRN    dextrose 10 % infusion   IntraVENous Continuous PRN    0.9 % sodium chloride infusion   IntraVENous Continuous    sodium chloride flush 0.9 % injection 5-40 mL  5-40 mL IntraVENous 2 times per day    sodium chloride flush 0.9 % injection 5-40 mL  5-40 mL IntraVENous PRN    0.9 % sodium chloride infusion   IntraVENous PRN    potassium chloride (KLOR-CON M) extended release tablet 40 mEq  40 mEq Oral PRN    Or    potassium bicarb-citric acid (EFFER-K) effervescent tablet 40 mEq  40 mEq Oral PRN    Or    potassium chloride 10 mEq/100 mL IVPB (Peripheral Line)  10 mEq IntraVENous PRN    magnesium sulfate 2000 mg in 50 mL IVPB premix  2,000 mg IntraVENous PRN    enoxaparin (LOVENOX) injection 40 mg  40 mg SubCUTAneous Daily    promethazine (PHENERGAN) tablet 12.5 mg  12.5 mg Oral Q6H PRN    Or    ondansetron (ZOFRAN) injection 4 mg  4 mg IntraVENous Q6H PRN    bisacodyl (DULCOLAX) EC tablet 5 mg  5 mg Oral Daily PRN    aluminum & magnesium hydroxide-simethicone (MAALOX) 200-200-20 MG/5ML suspension 30 mL  30 mL Oral Q6H PRN    acetaminophen (TYLENOL) tablet 650 mg  650 mg Oral Q6H PRN    Or    acetaminophen (TYLENOL) suppository 650 mg  650 mg Rectal Q6H PRN       Signed:  Rhonda Ariza MD    Part of this note may have been written by using a voice dictation software. The note has been proof read but may still contain some grammatical/other typographical errors.

## 2023-09-15 PROCEDURE — 6370000000 HC RX 637 (ALT 250 FOR IP): Performed by: FAMILY MEDICINE

## 2023-09-15 PROCEDURE — 6360000002 HC RX W HCPCS: Performed by: FAMILY MEDICINE

## 2023-09-15 PROCEDURE — 2700000000 HC OXYGEN THERAPY PER DAY

## 2023-09-15 PROCEDURE — 2580000003 HC RX 258: Performed by: FAMILY MEDICINE

## 2023-09-15 PROCEDURE — 6360000002 HC RX W HCPCS: Performed by: INTERNAL MEDICINE

## 2023-09-15 PROCEDURE — 2000000000 HC ICU R&B

## 2023-09-15 PROCEDURE — 92526 ORAL FUNCTION THERAPY: CPT

## 2023-09-15 PROCEDURE — 6370000000 HC RX 637 (ALT 250 FOR IP): Performed by: INTERNAL MEDICINE

## 2023-09-15 PROCEDURE — 2580000003 HC RX 258: Performed by: INTERNAL MEDICINE

## 2023-09-15 PROCEDURE — 51702 INSERT TEMP BLADDER CATH: CPT

## 2023-09-15 PROCEDURE — 97530 THERAPEUTIC ACTIVITIES: CPT

## 2023-09-15 PROCEDURE — 94761 N-INVAS EAR/PLS OXIMETRY MLT: CPT

## 2023-09-15 RX ADMIN — DIVALPROEX SODIUM 500 MG: 125 CAPSULE, COATED PELLETS ORAL at 22:36

## 2023-09-15 RX ADMIN — DIVALPROEX SODIUM 500 MG: 125 CAPSULE, COATED PELLETS ORAL at 15:44

## 2023-09-15 RX ADMIN — AMPICILLIN SODIUM AND SULBACTAM SODIUM 3000 MG: 2; 1 INJECTION, POWDER, FOR SOLUTION INTRAMUSCULAR; INTRAVENOUS at 02:26

## 2023-09-15 RX ADMIN — SERTRALINE 150 MG: 100 TABLET, FILM COATED ORAL at 08:34

## 2023-09-15 RX ADMIN — RISPERIDONE 1 MG: 1 TABLET ORAL at 08:34

## 2023-09-15 RX ADMIN — SODIUM CHLORIDE: 9 INJECTION, SOLUTION INTRAVENOUS at 15:51

## 2023-09-15 RX ADMIN — ASPIRIN 81 MG 81 MG: 81 TABLET ORAL at 08:35

## 2023-09-15 RX ADMIN — RISPERIDONE 1 MG: 1 TABLET ORAL at 20:24

## 2023-09-15 RX ADMIN — AMPICILLIN SODIUM AND SULBACTAM SODIUM 3000 MG: 2; 1 INJECTION, POWDER, FOR SOLUTION INTRAMUSCULAR; INTRAVENOUS at 08:32

## 2023-09-15 RX ADMIN — AMPICILLIN SODIUM AND SULBACTAM SODIUM 3000 MG: 2; 1 INJECTION, POWDER, FOR SOLUTION INTRAMUSCULAR; INTRAVENOUS at 15:44

## 2023-09-15 RX ADMIN — MEMANTINE 10 MG: 5 TABLET ORAL at 08:35

## 2023-09-15 RX ADMIN — DIVALPROEX SODIUM 500 MG: 125 CAPSULE, COATED PELLETS ORAL at 05:43

## 2023-09-15 RX ADMIN — Medication 3 MG: at 20:24

## 2023-09-15 RX ADMIN — ENOXAPARIN SODIUM 40 MG: 100 INJECTION SUBCUTANEOUS at 08:35

## 2023-09-15 RX ADMIN — DONEPEZIL HYDROCHLORIDE 10 MG: 5 TABLET, FILM COATED ORAL at 20:24

## 2023-09-15 RX ADMIN — ATORVASTATIN CALCIUM 10 MG: 10 TABLET, FILM COATED ORAL at 08:35

## 2023-09-15 RX ADMIN — TAMSULOSIN HYDROCHLORIDE 0.4 MG: 0.4 CAPSULE ORAL at 08:34

## 2023-09-15 RX ADMIN — MEMANTINE 10 MG: 5 TABLET ORAL at 20:24

## 2023-09-15 RX ADMIN — LOSARTAN POTASSIUM 50 MG: 50 TABLET, FILM COATED ORAL at 08:34

## 2023-09-15 RX ADMIN — SODIUM CHLORIDE, PRESERVATIVE FREE 10 ML: 5 INJECTION INTRAVENOUS at 20:39

## 2023-09-15 RX ADMIN — AMPICILLIN SODIUM AND SULBACTAM SODIUM 3000 MG: 2; 1 INJECTION, POWDER, FOR SOLUTION INTRAMUSCULAR; INTRAVENOUS at 20:24

## 2023-09-15 ASSESSMENT — PAIN SCALES - GENERAL
PAINLEVEL_OUTOF10: 0

## 2023-09-15 NOTE — PROGRESS NOTES
ACUTE PHYSICAL THERAPY GOALS:   (Developed with and agreed upon by patient and/or caregiver.)  STG:  (1.)Mr. Ness Abrams will move from supine to sit and sit to supine  with MINIMAL ASSIST within 4-7 treatment day(s). -GOAL MET 9/8/23     (2.)Mr. Ness Abrams will transfer from bed to chair and chair to bed with MINIMAL ASSIST using the least restrictive device within 4-7 treatment day(s). Met 9/10  (3.)Mr. Ness Abrams will ambulate with MINIMAL ASSIST for 75 feet with the least restrictive device within 4-7 treatment day(s). Met 9/10    GOALS MODIFIED 9/10/23 :  1) Bed mobility with SBA, flat bed no rail. 2) transfers with walker & CGA. 3) pt ambulating 200-250ft with rolling walker & CGA    _______________________________________________________________________________________________     PHYSICAL THERAPY Daily Note and PM  (Link to Caseload Tracking: PT Visit Days : 4  Acknowledge Orders  Time In/Out  PT Charge Capture  Rehab Caseload Tracker    Ezequiel Alvares is a 70 y.o. male   PRIMARY DIAGNOSIS: Severe dementia with psychotic disturbance ([address])  Delirium [R41.0]  Hospital admission due to social situation [Z60.9]  Severe dementia with psychotic disturbance, unspecified dementia type ([address]) [F03. C2]  Acute psychosis ([address]) [F23]       Reason for Referral: Generalized Muscle Weakness (M62.81)  Difficulty in walking, Not elsewhere classified (R26.2)  Inpatient: Payor: Kaylee Bernard / Plan: MEDICARE PART A AND B / Product Type: *No Product type* /     ASSESSMENT:     REHAB RECOMMENDATIONS:   Recommendation to date pending progress:  Setting:  Short-term Rehab    Equipment:    To Be Determined     ASSESSMENT:  Mr. Ness Abrams presented as lethargic , constant cues needed to keep eyes open throughout session, speech was mostly not understandable, but pt seemed to follow simple 1 step directions well. Pt showed increased gait distance & required less assist for transfers & gait.  This pt will need additional in-pt rehab at a SNF on NT=Not Tested    PLAN:   FREQUENCY AND DURATION: Daily for duration of hospital stay or until stated goals are met, whichever comes first.    THERAPY PROGNOSIS: Good    PROBLEM LIST:   (Skilled intervention is medically necessary to address:)  Decreased ADL/Functional Activities  Decreased Activity Tolerance  Decreased AROM/PROM  Decreased Balance  Decreased Cognition  Decreased Coordination  Decreased Gait Ability  Decreased Safety Awareness  Decreased Strength  Decreased Transfer Abilities INTERVENTIONS PLANNED:   (Benefits and precautions of physical therapy have been discussed with the patient.)  Therapeutic Activity  Therapeutic Exercise/HEP  Gait Training  Education       TREATMENT:       TREATMENT:   Therapeutic Activity (38 Minutes): Therapeutic activity included Rolling, Supine to Sit, Scooting, Ambulation on level ground, Sitting balance , and Standing balance to improve functional Activity tolerance, Balance, Coordination, Mobility, Strength, and ROM.     TREATMENT GRID:  B UE/LE AAROM Date:  9/13 Date:  9/14   Date:  9/15     Activity/Exercise Parameters Parameters Parameters   Ankle pumps 12 12 12   Quad sets 12 12 12   Heel slides 12 12 12   Hip abd/add 12 12 12   Shoulder flex/ext 12     Shoulder ab/ad 12     Elbow flex/ext 12     SAQ  12    Marching in place  12    LAQ  12            AFTER TREATMENT PRECAUTIONS: Alarm Activated, Bed/Chair Locked, Call light within reach, Chair, Needs within reach, and RN notified    INTERDISCIPLINARY COLLABORATION:  RN/ PCT and PT/ PTA    EDUCATION:      TIME IN/OUT:  Time In: 1445  Time Out: [facility]  Minutes: 45    CELSO MORENO PTA

## 2023-09-15 NOTE — CARE COORDINATION
1436: RN CM left a HIPPA complaint voicemail with [facility] requesting a return phone call. 1447: RN CM met with the patient's sister Angel Conrad. She is undecided if she wants to accept the bed offer at [facility]. She is still speaking with assisted living facilities. RN CM discussed the recommendation for short term rehabilitation and explained why it was recommended. The patient has not received any new bed offers. Updated clinicals were sent to the skilled nursing facilities that have not yet responded to the referral. Referrals have been sent to approximately forty facilities. A referral was sent to [facility] yesterday. RN CM called [facility] and left a HIPPA compliant voicemail requesting a return phone call. JERICHO CRAIG called [facility] and left a HIPPA complaint voicemail requesting a return phone call.

## 2023-09-15 NOTE — PROGRESS NOTES
LTG: patient will tolerate safest, least restrictive oral diet without s/sx airway compromise  STG: Patient will tolerate  soft/bite sized diet and thin liquids without overt s/sx aspiration. Advanced   STG: Patient will tolerate ongoing po trials in efforts to advance diet. Progressing   STG: Patient will participate in instrumental swallowing assessment to objectively assess oropharyngeal swallow if clinically indicated    SPEECH LANGUAGE PATHOLOGY: DYSPHAGIA  Daily Note #3    NAME: Aaron Kenny III  : 1955  MRN: [id number]    ADMISSION DATE: 9/3/2023  PRIMARY DIAGNOSIS: Severe dementia with psychotic disturbance ([address])  Delirium [R41.0]  Hospital admission due to social situation [Z60.9]  Severe dementia with psychotic disturbance, unspecified dementia type ([address]) [F03. C2]  Acute psychosis ([address]) [F23]    ICD-10: Treatment Diagnosis: R13.11 Dysphagia, Oral Phase    RECOMMENDATIONS   Diet:  Diet Solids Recommendation: Soft & Bite Sized  Liquid Consistency Recommendation: Thin    Speech therapy OK with family bringing outside food for the patient if OK with patient's medical provider. Medications: OK for meds whole provided individually with water. Compensatory Swallowing Strategies: Alternate solids and liquids;Small bites/sips; Remain upright for 30-45 minutes after meals;Upright as possible for all oral intake;Eat/Feed slowly; Assist feed   Recommendations: Dysphagia treatment;Supervision with meals;Assistance with meals    Therapeutic Interventions: Diet tolerance monitoring;Patient/Family education   Patient continues to require skilled intervention:  Yes. Recommend ongoing speech therapy services during this hospitalization      ASSESSMENT    Dysphagia Diagnosis: Mild to moderate oral stage dysphagia  Dysphagia Impression : Patient presents with mentation as greatest barrier to safe PO intake.  Staff reports patient's alertness does fluctuate and family states current diet is well

## 2023-09-15 NOTE — PLAN OF CARE
Problem: Safety - Medical Restraint  Goal: Remains free of injury from restraints (Restraint for Interference with Medical Device)  Description: INTERVENTIONS:  1. Determine that other, less restrictive measures have been tried or would not be effective before applying the restraint  2. Evaluate the patient's condition at the time of restraint application  3. Inform patient/family regarding the reason for restraint  4.  Q2H: Monitor safety, psychosocial status, comfort, nutrition and hydration  Outcome: Progressing     Problem: ABCDS Injury Assessment  Goal: Absence of physical injury  Outcome: Progressing     Problem: Skin/Tissue Integrity - Adult  Goal: Skin integrity remains intact  Outcome: Progressing  Flowsheets (Taken 9/14/2023 1900)  Skin Integrity Remains Intact: Monitor for areas of redness and/or skin breakdown     Problem: Musculoskeletal - Adult  Goal: Return mobility to safest level of function  Outcome: Progressing  Flowsheets (Taken 9/14/2023 1900)  Return Mobility to Safest Level of Function:   Assess patient stability and activity tolerance for standing, transferring and ambulating with or without assistive devices   Assist with transfers and ambulation using safe patient handling equipment as needed   Ensure adequate protection for wounds/incisions during mobilization     Problem: Gastrointestinal - Adult  Goal: Maintains adequate nutritional intake  Outcome: Progressing  Flowsheets (Taken 9/14/2023 1900)  Maintains adequate nutritional intake: Monitor percentage of each meal consumed

## 2023-09-15 NOTE — CARE COORDINATION
Discharge planning was discussed with the Critical Care Interdisciplinary Team. The patient is pending possible short to long term placement.

## 2023-09-16 LAB
ANION GAP SERPL CALC-SCNC: 6 MMOL/L (ref 2–11)
ARTERIAL PATENCY WRIST A: POSITIVE
BASE EXCESS BLD CALC-SCNC: 5.1 MMOL/L
BDY SITE: ABNORMAL
BUN SERPL-MCNC: 18 MG/DL (ref 8–23)
CALCIUM SERPL-MCNC: 9.3 MG/DL (ref 8.3–10.4)
CHLORIDE SERPL-SCNC: 104 MMOL/L (ref 101–110)
CO2 SERPL-SCNC: 30 MMOL/L (ref 21–32)
CREAT SERPL-MCNC: 0.92 MG/DL (ref 0.8–1.5)
GAS FLOW.O2 O2 DELIVERY SYS: ABNORMAL
GLUCOSE SERPL-MCNC: 134 MG/DL (ref 65–100)
HCO3 BLD-SCNC: 30 MMOL/L (ref 22–26)
PCO2 BLD: 44.5 MMHG (ref 35–45)
PH BLD: 7.44 (ref 7.35–7.45)
PO2 BLD: 68 MMHG (ref 75–100)
POTASSIUM SERPL-SCNC: 3.9 MMOL/L (ref 3.5–5.1)
SAO2 % BLD: 93.8 % (ref 95–98)
SERVICE CMNT-IMP: ABNORMAL
SODIUM SERPL-SCNC: 140 MMOL/L (ref 133–143)
SPECIMEN TYPE: ABNORMAL
VALPROATE SERPL-MCNC: 80 UG/ML (ref 50–100)

## 2023-09-16 PROCEDURE — 6370000000 HC RX 637 (ALT 250 FOR IP): Performed by: INTERNAL MEDICINE

## 2023-09-16 PROCEDURE — 6370000000 HC RX 637 (ALT 250 FOR IP): Performed by: HOSPITALIST

## 2023-09-16 PROCEDURE — 80164 ASSAY DIPROPYLACETIC ACD TOT: CPT

## 2023-09-16 PROCEDURE — 36600 WITHDRAWAL OF ARTERIAL BLOOD: CPT

## 2023-09-16 PROCEDURE — 82803 BLOOD GASES ANY COMBINATION: CPT

## 2023-09-16 PROCEDURE — 2580000003 HC RX 258: Performed by: FAMILY MEDICINE

## 2023-09-16 PROCEDURE — 80048 BASIC METABOLIC PNL TOTAL CA: CPT

## 2023-09-16 PROCEDURE — 2580000003 HC RX 258: Performed by: INTERNAL MEDICINE

## 2023-09-16 PROCEDURE — 94761 N-INVAS EAR/PLS OXIMETRY MLT: CPT

## 2023-09-16 PROCEDURE — 2500000003 HC RX 250 WO HCPCS: Performed by: HOSPITALIST

## 2023-09-16 PROCEDURE — 6370000000 HC RX 637 (ALT 250 FOR IP): Performed by: FAMILY MEDICINE

## 2023-09-16 PROCEDURE — 1100000000 HC RM PRIVATE

## 2023-09-16 PROCEDURE — 6360000002 HC RX W HCPCS: Performed by: INTERNAL MEDICINE

## 2023-09-16 PROCEDURE — 36415 COLL VENOUS BLD VENIPUNCTURE: CPT

## 2023-09-16 PROCEDURE — 51702 INSERT TEMP BLADDER CATH: CPT

## 2023-09-16 PROCEDURE — 6360000002 HC RX W HCPCS: Performed by: FAMILY MEDICINE

## 2023-09-16 RX ORDER — DIVALPROEX SODIUM 125 MG/1
500 CAPSULE, COATED PELLETS ORAL
Status: DISCONTINUED | OUTPATIENT
Start: 2023-09-16 | End: 2023-09-20 | Stop reason: HOSPADM

## 2023-09-16 RX ORDER — DIVALPROEX SODIUM 125 MG/1
250 CAPSULE, COATED PELLETS ORAL EVERY MORNING
Status: DISCONTINUED | OUTPATIENT
Start: 2023-09-17 | End: 2023-09-20 | Stop reason: HOSPADM

## 2023-09-16 RX ORDER — RISPERIDONE 1 MG/1
1 TABLET ORAL NIGHTLY
Status: DISCONTINUED | OUTPATIENT
Start: 2023-09-16 | End: 2023-09-20 | Stop reason: HOSPADM

## 2023-09-16 RX ORDER — RISPERIDONE 0.5 MG/1
0.5 TABLET ORAL EVERY MORNING
Status: DISCONTINUED | OUTPATIENT
Start: 2023-09-17 | End: 2023-09-20 | Stop reason: HOSPADM

## 2023-09-16 RX ORDER — NYSTATIN 100000 U/G
CREAM TOPICAL DAILY PRN
Status: DISCONTINUED | OUTPATIENT
Start: 2023-09-16 | End: 2023-09-16

## 2023-09-16 RX ADMIN — SODIUM CHLORIDE: 9 INJECTION, SOLUTION INTRAVENOUS at 14:41

## 2023-09-16 RX ADMIN — SODIUM CHLORIDE, PRESERVATIVE FREE 10 ML: 5 INJECTION INTRAVENOUS at 21:20

## 2023-09-16 RX ADMIN — TAMSULOSIN HYDROCHLORIDE 0.4 MG: 0.4 CAPSULE ORAL at 09:54

## 2023-09-16 RX ADMIN — LOSARTAN POTASSIUM 50 MG: 50 TABLET, FILM COATED ORAL at 09:53

## 2023-09-16 RX ADMIN — AMPICILLIN SODIUM AND SULBACTAM SODIUM 3000 MG: 2; 1 INJECTION, POWDER, FOR SOLUTION INTRAMUSCULAR; INTRAVENOUS at 04:01

## 2023-09-16 RX ADMIN — SODIUM CHLORIDE, PRESERVATIVE FREE 10 ML: 5 INJECTION INTRAVENOUS at 09:54

## 2023-09-16 RX ADMIN — MEMANTINE 10 MG: 5 TABLET ORAL at 19:36

## 2023-09-16 RX ADMIN — SERTRALINE 150 MG: 100 TABLET, FILM COATED ORAL at 09:53

## 2023-09-16 RX ADMIN — DONEPEZIL HYDROCHLORIDE 10 MG: 5 TABLET, FILM COATED ORAL at 19:36

## 2023-09-16 RX ADMIN — ATORVASTATIN CALCIUM 10 MG: 10 TABLET, FILM COATED ORAL at 09:54

## 2023-09-16 RX ADMIN — RISPERIDONE 1 MG: 1 TABLET ORAL at 19:36

## 2023-09-16 RX ADMIN — DIVALPROEX SODIUM 500 MG: 125 CAPSULE, COATED PELLETS ORAL at 19:35

## 2023-09-16 RX ADMIN — ENOXAPARIN SODIUM 40 MG: 100 INJECTION SUBCUTANEOUS at 09:54

## 2023-09-16 RX ADMIN — ANTI-FUNGAL POWDER MICONAZOLE NITRATE TALC FREE: 1.42 POWDER TOPICAL at 12:00

## 2023-09-16 RX ADMIN — Medication 3 MG: at 21:20

## 2023-09-16 RX ADMIN — ASPIRIN 81 MG 81 MG: 81 TABLET ORAL at 09:53

## 2023-09-16 RX ADMIN — MEMANTINE 10 MG: 5 TABLET ORAL at 09:54

## 2023-09-16 RX ADMIN — ANTI-FUNGAL POWDER MICONAZOLE NITRATE TALC FREE: 1.42 POWDER TOPICAL at 21:48

## 2023-09-16 RX ADMIN — DIVALPROEX SODIUM 500 MG: 125 CAPSULE, COATED PELLETS ORAL at 13:50

## 2023-09-16 ASSESSMENT — PAIN SCALES - GENERAL
PAINLEVEL_OUTOF10: 0

## 2023-09-16 NOTE — PLAN OF CARE
Problem: Discharge Planning  Goal: Discharge to home or other facility with appropriate resources  Outcome: Progressing  Flowsheets (Taken 9/16/2023 0800)  Discharge to home or other facility with appropriate resources: Identify barriers to discharge with patient and caregiver     Problem: Pain  Goal: Verbalizes/displays adequate comfort level or baseline comfort level  Outcome: Progressing     Problem: Safety - Adult  Goal: Free from fall injury  Outcome: Progressing  Flowsheets (Taken 9/16/2023 1340)  Free From Fall Injury: Instruct family/caregiver on patient safety     Problem: Safety - Medical Restraint  Goal: Remains free of injury from restraints (Restraint for Interference with Medical Device)  Description: INTERVENTIONS:  1. Determine that other, less restrictive measures have been tried or would not be effective before applying the restraint  2. Evaluate the patient's condition at the time of restraint application  3. Inform patient/family regarding the reason for restraint  4. Q2H: Monitor safety, psychosocial status, comfort, nutrition and hydration  Outcome: Progressing     Problem: ABCDS Injury Assessment  Goal: Absence of physical injury  Outcome: Progressing  Flowsheets (Taken 9/16/2023 1340)  Absence of Physical Injury: Implement safety measures based on patient assessment     Problem: Confusion  Goal: Confusion, delirium, dementia, or psychosis is improved or at baseline  Description: INTERVENTIONS:  1. Assess for possible contributors to thought disturbance, including medications, impaired vision or hearing, underlying metabolic abnormalities, dehydration, psychiatric diagnoses, and notify attending LIP  2. [place] high risk fall precautions, as indicated  3. Provide frequent short contacts to provide reality reorientation, refocusing and direction  4. Decrease environmental stimuli, including noise as appropriate  5.  Monitor and intervene to maintain adequate nutrition, hydration, elimination, sleep and activity  6. If unable to ensure safety without constant attention obtain sitter and review sitter guidelines with assigned personnel  7.  Initiate Psychosocial CNS and Spiritual Care consult, as indicated  Outcome: Progressing  Flowsheets (Taken 9/16/2023 0800)  Effect of thought disturbance (confusion, delirium, dementia, or psychosis) are managed with adequate functional status: Assess for contributors to thought disturbance, including medications, impaired vision or hearing, underlying metabolic abnormalities, dehydration, psychiatric diagnoses, notify LIP     Problem: Neurosensory - Adult  Goal: Achieves stable or improved neurological status  Outcome: Progressing  Flowsheets (Taken 9/16/2023 0800)  Achieves stable or improved neurological status: Assess for and report changes in neurological status  Goal: Achieves maximal functionality and self care  Outcome: Progressing  Flowsheets (Taken 9/16/2023 0800)  Achieves maximal functionality and self care: Encourage and assist patient to increase activity and self care with guidance from physical therapy/occupational therapy     Problem: Skin/Tissue Integrity - Adult  Goal: Skin integrity remains intact  Outcome: Progressing  Flowsheets  Taken 9/16/2023 1340  Skin Integrity Remains Intact: Monitor for areas of redness and/or skin breakdown  Taken 9/16/2023 0800  Skin Integrity Remains Intact: Monitor for areas of redness and/or skin breakdown     Problem: Musculoskeletal - Adult  Goal: Return mobility to safest level of function  Outcome: Progressing  Flowsheets (Taken 9/16/2023 0800)  Return Mobility to Safest Level of Function: Assess patient stability and activity tolerance for standing, transferring and ambulating with or without assistive devices     Problem: Gastrointestinal - Adult  Goal: Maintains adequate nutritional intake  Outcome: Progressing  Flowsheets (Taken 9/16/2023 0800)  Maintains adequate nutritional intake: Monitor Progressing  Flowsheets (Taken 9/16/2023 0800)  Care Plan - Patient's Chronic Conditions and Co-Morbidity Symptoms are Monitored and Maintained or Improved: Monitor and assess patient's chronic conditions and comorbid symptoms for stability, deterioration, or improvement

## 2023-09-16 NOTE — PROGRESS NOTES
PT attempted to see pt this pm but nursing requested to defer therapy this pm due to pt recently getting up with nursing staff then was returned to bed & is calm, staff preferring pt not be disturbed at this time. PT will follow up as indicated. Logan Cutler, PT, LORRAINE  .

## 2023-09-17 PROCEDURE — 6370000000 HC RX 637 (ALT 250 FOR IP): Performed by: FAMILY MEDICINE

## 2023-09-17 PROCEDURE — 94761 N-INVAS EAR/PLS OXIMETRY MLT: CPT

## 2023-09-17 PROCEDURE — 2580000003 HC RX 258: Performed by: FAMILY MEDICINE

## 2023-09-17 PROCEDURE — 1100000000 HC RM PRIVATE

## 2023-09-17 PROCEDURE — 6360000002 HC RX W HCPCS: Performed by: FAMILY MEDICINE

## 2023-09-17 PROCEDURE — 6370000000 HC RX 637 (ALT 250 FOR IP): Performed by: HOSPITALIST

## 2023-09-17 PROCEDURE — 97530 THERAPEUTIC ACTIVITIES: CPT

## 2023-09-17 PROCEDURE — 2580000003 HC RX 258: Performed by: INTERNAL MEDICINE

## 2023-09-17 PROCEDURE — 82962 GLUCOSE BLOOD TEST: CPT

## 2023-09-17 PROCEDURE — 94760 N-INVAS EAR/PLS OXIMETRY 1: CPT

## 2023-09-17 PROCEDURE — 6370000000 HC RX 637 (ALT 250 FOR IP): Performed by: INTERNAL MEDICINE

## 2023-09-17 PROCEDURE — 2700000000 HC OXYGEN THERAPY PER DAY

## 2023-09-17 PROCEDURE — 51702 INSERT TEMP BLADDER CATH: CPT

## 2023-09-17 PROCEDURE — 6360000002 HC RX W HCPCS: Performed by: INTERNAL MEDICINE

## 2023-09-17 RX ADMIN — LOSARTAN POTASSIUM 50 MG: 50 TABLET, FILM COATED ORAL at 09:18

## 2023-09-17 RX ADMIN — SODIUM CHLORIDE: 9 INJECTION, SOLUTION INTRAVENOUS at 02:51

## 2023-09-17 RX ADMIN — SERTRALINE 150 MG: 100 TABLET, FILM COATED ORAL at 09:12

## 2023-09-17 RX ADMIN — ANTI-FUNGAL POWDER MICONAZOLE NITRATE TALC FREE: 1.42 POWDER TOPICAL at 20:35

## 2023-09-17 RX ADMIN — MEMANTINE 10 MG: 5 TABLET ORAL at 20:34

## 2023-09-17 RX ADMIN — DIVALPROEX SODIUM 500 MG: 125 CAPSULE, COATED PELLETS ORAL at 20:35

## 2023-09-17 RX ADMIN — HALOPERIDOL LACTATE 2 MG: 5 INJECTION, SOLUTION INTRAMUSCULAR at 22:09

## 2023-09-17 RX ADMIN — ATORVASTATIN CALCIUM 10 MG: 10 TABLET, FILM COATED ORAL at 09:12

## 2023-09-17 RX ADMIN — TAMSULOSIN HYDROCHLORIDE 0.4 MG: 0.4 CAPSULE ORAL at 09:13

## 2023-09-17 RX ADMIN — SODIUM CHLORIDE, PRESERVATIVE FREE 10 ML: 5 INJECTION INTRAVENOUS at 20:36

## 2023-09-17 RX ADMIN — RISPERIDONE 0.5 MG: 0.5 TABLET ORAL at 09:13

## 2023-09-17 RX ADMIN — ASPIRIN 81 MG 81 MG: 81 TABLET ORAL at 09:13

## 2023-09-17 RX ADMIN — DONEPEZIL HYDROCHLORIDE 10 MG: 5 TABLET, FILM COATED ORAL at 20:34

## 2023-09-17 RX ADMIN — Medication 3 MG: at 20:31

## 2023-09-17 RX ADMIN — DIVALPROEX SODIUM 500 MG: 125 CAPSULE, COATED PELLETS ORAL at 14:11

## 2023-09-17 RX ADMIN — RISPERIDONE 1 MG: 1 TABLET ORAL at 20:34

## 2023-09-17 RX ADMIN — MEMANTINE 10 MG: 5 TABLET ORAL at 09:12

## 2023-09-17 RX ADMIN — DIVALPROEX SODIUM 250 MG: 125 CAPSULE, COATED PELLETS ORAL at 09:12

## 2023-09-17 RX ADMIN — ENOXAPARIN SODIUM 40 MG: 100 INJECTION SUBCUTANEOUS at 09:57

## 2023-09-17 RX ADMIN — SODIUM CHLORIDE, PRESERVATIVE FREE 10 ML: 5 INJECTION INTRAVENOUS at 09:18

## 2023-09-17 ASSESSMENT — PAIN SCALES - GENERAL
PAINLEVEL_OUTOF10: 0
PAINLEVEL_OUTOF10: 0

## 2023-09-17 NOTE — PROGRESS NOTES
Hospitalist Progress Note   Admit Date:  9/3/2023  8:00 PM   Name:  Beatrice Dle Cid III   Age:  70 y.o. Sex:  male  :  1955   MRN:  [id number]   Room:  [facility]/    Presenting/Chief Complaint: No chief complaint on file. Reason(s) for Admission: Delirium [R41.0]  Hospital admission due to social situation [Z60.9]  Severe dementia with psychotic disturbance, unspecified dementia type ([address]) [F03. C2]  Acute psychosis [facility]) [address] Course:   Beatrice Del Cid III is a 70 y.o. male with medical history of early onset dementia, hypertension, diet-controlled diabetes admitted with worsening confusion and hallucination likely in the setting of progressive dementia. Patient became increasingly agitated and required antipsychotic medication and four-point restraints. Transferred to ICU for Precedex drip. Lab workup and CT head no evidence of any abnormalities to explain behavior. Zyprexa was ineffective so this was stopped. Pt was started on seroquel and this also did not seem to work at 100mg doses. Depakote added. Seroquel switched to risperdal . Then switched to higher dose of zyprexa, then back to rispderal.  Numerous different medication trials and combinations attempted. Had aspiration event on 9-10.  nothing on CXR but findings lag behind clinical events. Started on unasyn and SLP consulted. Subjective & 24hr Events:   Patient seen at bedside, resting in bed comfortably. He is alert and awake, AOx3, on 1 L nasal cannula. As per nursing staff patient has been doing well, did not have any episodes of agitation, restlessness or combative behavior in the past 3 days. Mood is stable with risperidone and Depakote. ROS:  10 point ROS is negative except for what mentioned above      Assessment & Plan:         Acute psychosis with severe dementia ([address])  -  Decreased Risperdal from 1 mg p.o. twice daily to 0.5 mg in morning and 1 mg at bedtime.   Also decreased Depakote from 500 mg p.o. 3 times daily to 250 mg p.o. in a.m. and 500 mg in afternoon and bedtime. Monitor patient's mentation following this adjustment in doses. -psych following, appreciate  -also has to remain out of restraints 24 hours to be discharged  -depakote level was 77 (on BID dosing). Psych recommended increasing to TID dosing. Depakote level on 9/14 is 82    -try to keep patient awake during day  -haldol IV PRN   -cardiac monitoring while getting antipsychotic agents    -Non-pharmacological intervention  Remove unnecessary lines and tubes   Reorient the patient throughout the day  Window open and lights on during the day. Lights off, television off, noises down during the night. If able, decrease nursing checks at night  Avoid restraints to the best of your ability   -Pharmacological intervention  Correct metabolic abnormalities  Limit benzodiazepines, antihistamines, narcotics, anticholinergics. Preference towards Precedex for sedation over fentanyl and benzodiazepines/GABAa agonists. For persistent insomnia can use melatonin four hours prior to bedtime or Seroquel at bedtime         Fever, Suspected aspiration PNA  -cont unasyn; can switch to augmentin if consistently taking PO  -on modified diet per SLP. Aspiration precautions  9/16-  Patient completed 7 days of IV Unasyn 9/16/2023. Benign prostatic hyperplasia with urinary retention  -attempted to remove gaffney but was retaining on 9-10 so replaced  -cont flomax        HTN (hypertension)  -cont losartan  -IV PRN hydralazine       PT/OT evals and PPD needed/ordered? Yes  Diet:  ADULT ORAL NUTRITION SUPPLEMENT; Breakfast, Lunch, Dinner; Standard High Calorie/High Protein Oral Supplement  ADULT DIET;  Dysphagia - Soft and Bite Sized  VTE prophylaxis: Lovenox  Code status: Full Code      Non-peripheral Lines and Tubes (if present):      Urinary Catheter 09/11/23 Gaffney (Active)          Telemetry (if present):  Cardiac/Telemetry Monitor On: mEq  40 mEq Oral PRN    Or    potassium bicarb-citric acid (EFFER-K) effervescent tablet 40 mEq  40 mEq Oral PRN    Or    potassium chloride 10 mEq/100 mL IVPB (Peripheral Line)  10 mEq IntraVENous PRN    magnesium sulfate 2000 mg in 50 mL IVPB premix  2,000 mg IntraVENous PRN    enoxaparin (LOVENOX) injection 40 mg  40 mg SubCUTAneous Daily    promethazine (PHENERGAN) tablet 12.5 mg  12.5 mg Oral Q6H PRN    Or    ondansetron (ZOFRAN) injection 4 mg  4 mg IntraVENous Q6H PRN    bisacodyl (DULCOLAX) EC tablet 5 mg  5 mg Oral Daily PRN    aluminum & magnesium hydroxide-simethicone (MAALOX) 200-200-20 MG/5ML suspension 30 mL  30 mL Oral Q6H PRN    acetaminophen (TYLENOL) tablet 650 mg  650 mg Oral Q6H PRN    Or    acetaminophen (TYLENOL) suppository 650 mg  650 mg Rectal Q6H PRN       Signed:  Bethanie Lucas MD    Part of this note may have been written by using a voice dictation software. The note has been proof read but may still contain some grammatical/other typographical errors.

## 2023-09-17 NOTE — PROGRESS NOTES
ACUTE PHYSICAL THERAPY GOALS:   (Developed with and agreed upon by patient and/or caregiver.)  GOALS MODIFIED 9/10/23 :  1) Bed mobility with SBA, flat bed no rail. 2) transfers with walker & CGA. 3) pt ambulating 200-250ft with rolling walker & CGA    PHYSICAL THERAPY: Daily Note AM   (Link to Caseload Tracking: PT Visit Days : 5  Time In/Out PT Charge Capture  Rehab Caseload Tracker  Orders    Kalyn Beverly is a 70 y.o. male   PRIMARY DIAGNOSIS: Severe dementia with psychotic disturbance ([address])  Delirium [R41.0]  Hospital admission due to social situation [Z60.9]  Severe dementia with psychotic disturbance, unspecified dementia type ([address]) [F03. C2]  Acute psychosis ([address]) [F23]       Inpatient: Payor: Abdoulaye Arias / Plan: MEDICARE PART A AND B / Product Type: *No Product type* /     ASSESSMENT:     REHAB RECOMMENDATIONS:   Recommendation to date pending progress:  Setting:  Short-term Rehab    Equipment:    To Be Determined     ASSESSMENT:  Mr. Hossein Gill was pleasantly confused but cooperative this am.  Session limited by meal arrival but patient able to get himself to the edge of the bed without assist and then stand and transfer to the recliner with CGA-min A. Patient set up with his meal but did not initiate any self-feeding. Patient required hand over hand assist.  He was left up in the chair with sitter present to help him continue with his meal.  Will continue therapy during admission. If family plans to transfer him to an [facility], he would need a memory care facility or a higher level of care at the [facility]. SUBJECTIVE:   Mr. Hossein Gill agreeable.     Social/Functional Lives With: Alone  Type of Home: House  Bathroom Equipment: None  Home Equipment: None  ADL Assistance: Independent  OBJECTIVE:     PAIN: VITALS / O2: PRECAUTION / Marden Duhamel / DRAINS:   Pre Treatment:  no complaints         Post Treatment: no complaints Vitals        Oxygen  O2 Device: Nasal cannula Navarro Catheter and

## 2023-09-17 NOTE — PROGRESS NOTES
TRANSFER - IN REPORT:    Verbal report received from [facility], RN on Merwyn Arm III  being received from [facility] ICU for routine progression of patient care      Report consisted of patient's Situation, Background, Assessment and   Recommendations(SBAR). Information from the following report(s) Nurse Handoff Report was reviewed with the receiving nurse. Opportunity for questions and clarification was provided. Assessment completed upon patient's arrival to unit and care assumed.

## 2023-09-17 NOTE — PLAN OF CARE
Problem: Discharge Planning  Goal: Discharge to home or other facility with appropriate resources  Outcome: Progressing  Flowsheets (Taken 9/16/2023 1901 by Stephy Pacheco RN)  Discharge to home or other facility with appropriate resources: Identify barriers to discharge with patient and caregiver     Problem: Pain  Goal: Verbalizes/displays adequate comfort level or baseline comfort level  Outcome: Progressing  Flowsheets (Taken 9/16/2023 1901 by Stephy Pacheco RN)  Verbalizes/displays adequate comfort level or baseline comfort level: Encourage patient to monitor pain and request assistance     Problem: Safety - Adult  Goal: Free from fall injury  Outcome: Progressing     Problem: ABCDS Injury Assessment  Goal: Absence of physical injury  Outcome: Progressing     Problem: Confusion  Goal: Confusion, delirium, dementia, or psychosis is improved or at baseline  Description: INTERVENTIONS:  1. Assess for possible contributors to thought disturbance, including medications, impaired vision or hearing, underlying metabolic abnormalities, dehydration, psychiatric diagnoses, and notify attending LIP  2. [place] high risk fall precautions, as indicated  3. Provide frequent short contacts to provide reality reorientation, refocusing and direction  4. Decrease environmental stimuli, including noise as appropriate  5. Monitor and intervene to maintain adequate nutrition, hydration, elimination, sleep and activity  6. If unable to ensure safety without constant attention obtain sitter and review sitter guidelines with assigned personnel  7.  Initiate Psychosocial CNS and Spiritual Care consult, as indicated  Outcome: Progressing  Flowsheets (Taken 9/16/2023 1901 by Stephy Pacheco RN)  Effect of thought disturbance (confusion, delirium, dementia, or psychosis) are managed with adequate functional status: Assess for contributors to thought disturbance, including medications, impaired vision or hearing, underlying metabolic abnormalities, dehydration, psychiatric diagnoses, notify LIP     Problem: Neurosensory - Adult  Goal: Achieves stable or improved neurological status  Outcome: Progressing  Flowsheets (Taken 9/16/2023 1901 by Janet Sexton RN)  Achieves stable or improved neurological status: Assess for and report changes in neurological status  Goal: Achieves maximal functionality and self care  Outcome: Progressing  Flowsheets (Taken 9/16/2023 1901 by Janet Sexton RN)  Achieves maximal functionality and self care: Encourage and assist patient to increase activity and self care with guidance from physical therapy/occupational therapy

## 2023-09-17 NOTE — ICUWATCH
TRANSFER - OUT REPORT:    Verbal report given to [facility], RN on Abida Abrams III  being transferred to [phone] for routine progression of patient care       Report consisted of patient's Situation, Background, Assessment and   Recommendations(SBAR). Information from the following report(s) Nurse Handoff Report, Adult Overview, Intake/Output, MAR, Recent Results, and Alarm Parameters was reviewed with the receiving nurse. Lines:   Peripheral IV 09/13/23 Distal;Posterior;Right Forearm (Active)   Site Assessment Clean, dry & intact 09/16/23 1901   Line Status Infusing 09/16/23 [facility] Connections checked and tightened 09/16/23 1901   Phlebitis Assessment No symptoms 09/16/23 1901   Infiltration Assessment 0 09/16/23 1901   Alcohol Cap Used Yes 09/16/23 1901   Dressing Status Clean, dry & intact 09/16/23 1901   Dressing Type Transparent 09/16/23 1901        Opportunity for questions and clarification was provided. Patient transported with:  Juliette Vides ( Patients sister ) notified of transfer to [address] [place] [address] unit room [facility]. Message left on answering machine.

## 2023-09-18 PROCEDURE — 6360000002 HC RX W HCPCS: Performed by: FAMILY MEDICINE

## 2023-09-18 PROCEDURE — 1100000000 HC RM PRIVATE

## 2023-09-18 PROCEDURE — 6370000000 HC RX 637 (ALT 250 FOR IP): Performed by: FAMILY MEDICINE

## 2023-09-18 PROCEDURE — 92526 ORAL FUNCTION THERAPY: CPT

## 2023-09-18 PROCEDURE — 2580000003 HC RX 258: Performed by: FAMILY MEDICINE

## 2023-09-18 PROCEDURE — 6370000000 HC RX 637 (ALT 250 FOR IP): Performed by: HOSPITALIST

## 2023-09-18 PROCEDURE — 6370000000 HC RX 637 (ALT 250 FOR IP): Performed by: INTERNAL MEDICINE

## 2023-09-18 RX ADMIN — DONEPEZIL HYDROCHLORIDE 10 MG: 5 TABLET, FILM COATED ORAL at 20:53

## 2023-09-18 RX ADMIN — SODIUM CHLORIDE, PRESERVATIVE FREE 10 ML: 5 INJECTION INTRAVENOUS at 09:06

## 2023-09-18 RX ADMIN — LOSARTAN POTASSIUM 50 MG: 50 TABLET, FILM COATED ORAL at 09:06

## 2023-09-18 RX ADMIN — TAMSULOSIN HYDROCHLORIDE 0.4 MG: 0.4 CAPSULE ORAL at 09:06

## 2023-09-18 RX ADMIN — ENOXAPARIN SODIUM 40 MG: 100 INJECTION SUBCUTANEOUS at 09:06

## 2023-09-18 RX ADMIN — MEMANTINE 10 MG: 5 TABLET ORAL at 20:53

## 2023-09-18 RX ADMIN — SERTRALINE 150 MG: 100 TABLET, FILM COATED ORAL at 09:06

## 2023-09-18 RX ADMIN — DIVALPROEX SODIUM 500 MG: 125 CAPSULE, COATED PELLETS ORAL at 20:52

## 2023-09-18 RX ADMIN — SODIUM CHLORIDE, PRESERVATIVE FREE 10 ML: 5 INJECTION INTRAVENOUS at 21:02

## 2023-09-18 RX ADMIN — ASPIRIN 81 MG 81 MG: 81 TABLET ORAL at 09:06

## 2023-09-18 RX ADMIN — DIVALPROEX SODIUM 250 MG: 125 CAPSULE, COATED PELLETS ORAL at 09:06

## 2023-09-18 RX ADMIN — RISPERIDONE 0.5 MG: 0.5 TABLET ORAL at 09:06

## 2023-09-18 RX ADMIN — ATORVASTATIN CALCIUM 10 MG: 10 TABLET, FILM COATED ORAL at 09:06

## 2023-09-18 RX ADMIN — MEMANTINE 10 MG: 5 TABLET ORAL at 09:06

## 2023-09-18 RX ADMIN — DIVALPROEX SODIUM 500 MG: 125 CAPSULE, COATED PELLETS ORAL at 14:41

## 2023-09-18 RX ADMIN — ANTI-FUNGAL POWDER MICONAZOLE NITRATE TALC FREE: 1.42 POWDER TOPICAL at 09:07

## 2023-09-18 RX ADMIN — RISPERIDONE 1 MG: 1 TABLET ORAL at 20:54

## 2023-09-18 ASSESSMENT — PAIN SCALES - GENERAL: PAINLEVEL_OUTOF10: 0

## 2023-09-18 NOTE — PROGRESS NOTES
Comprehensive Nutrition Assessment    Type and Reason for Visit: Reassess    Nutrition Recommendations/Plan:   Meals and Snacks:  Diet: Continue current order  Nutrition Supplement Therapy:  Medical food supplement therapy:  Continue Ensure Enlive three times per day (this provides 350 kcal and 20 grams protein per bottle). Prefers chocolate       Malnutrition Assessment:  Malnutrition Status: At risk for malnutrition (Comment) (very poor intake intially after admission, widely vraible po intake, mild temporal muscle loss)  NFPE:  mild temporal muscle loss  9/18    Nutrition Assessment:  Nutrition History: 9/5: Pt unable to provide nutrition hx 2/2 dementia. JERICHO Keenan provided nutrition hx to RD. Pt previously lives alone at home. States that family was brining patient meals daily 2/2 worsening dementia/confusion as he was forgetting to eat at times. Eats well when has food provided. Weight hx shows overall stable/increasing weight: 9/27/22 189 lbs, 12/14/22: 189 lbs, CBW of 196 lbs (bed weight- 9/5/23). 9/12: Per sister, Bakari, pt tries to eat healthy/more Mediterranean. B-oatmeal and blueberry, L-turkey wrap, D-chicken or salmon, vegetables. She does not that he has a preference for sweets  and saw him eating cocoa puffs but he really likes anything sweet. Nutrition Background:       H/O: early onset dementia, anxiety, depression , GERD, HLH, HTN, BPH, SSS/pacemaker, CPH, diet controlled DM. Presented with 2 weeks of hallucinations. Admitted with worsening confusion and increased agitation. Admitted d/t increasing agitation and combativeness and family unable to care for him in current condition. Likely progressive dementia with psychotic disturbances. .   Nutrition Interval:  9/9: Per RN note-not following commands sufficiently to swallow safely at this time. 9/10: Per MD-Had aspiration event on 9-10.  nothing on CXR but findings lag behind clinical events. Started on unasyn and SLP consulted.   9/10: SLP eval-\"Patient presents with mentation as greatest barrier to safe PO intake. Easily distracted and talking with PO in oral cavity at times. Increased mastication time with solids, but adequate oral clearance. No overt s/sx with any/all consistencies. \"MM5 recommneded  9/12: SLP [id number] MM5 recommended  9/13: SLP [id number]   9/18: ALESSANDRO eval-not a canddate for diet advancement d/t mentation    Pt seen sitting in bed,  Sister, Bakari at bedside. Bakari  reports he is eating up a edward tonight. He ate all of a fish sandwich and drank all of shake from [employer] before his supper tray arrived. He also drank a bottle of Ensure  before supper and then another bottle with supper. Lunch and supper trays in room. Lunch tray with 1-25% consumed. Supper tray with 51-76% consumed. Bakari reports she thinks he was too groggy at lunch to eat. Pt says he does not eat much red meat and lunch had pot roast. Sister reports he was eating all day on Friday and he some more outside food of chicken nuggets and a shake. Pt reports he has been drinking 3 bottles of Ensure each day. Lab Results   Component Value Date/Time    POCGLU 127 09/05/2023 07:44 AM    POCGLU 118 09/04/2023 08:58 PM    POCGLU 135 09/04/2023 05:33 PM    POCGLU 130 09/04/2023 11:37 AM    POCGLU 116 09/04/2023 07:30 AM    POCGLU 118 09/03/2023 11:13 PM      Mildly elevated glucoses with h/o diet controlled DM. Would continue to not restrict CHO in diet or ONS until po intake is consistently meeting estimated needs. Current Nutrition Therapies:  ADULT ORAL NUTRITION SUPPLEMENT; Breakfast, Lunch, Dinner; Standard High Calorie/High Protein Oral Supplement  ADULT DIET;  Dysphagia - Soft and Bite Sized    Current Intake:   Average Meal Intake: 51-75% (for 5 recorded meals in past 6 days) Average Supplements Intake: % (stated)      Anthropometric Measures:  Height: 6' 3\" (190.5 cm)  Current Body Wt: 186 lb 1.1 oz (84.4 kg) (9/18), Weight source: Bed

## 2023-09-18 NOTE — CARE COORDINATION
6793: RN CM sent updated clinicals to [place], [address] Acute, [address] Maria E Pedraza at [place], [facility], [address] [facility] Acute, [address] [facility], [address] [facility], [address] [address] [facility] [address] at [place] and [employer], [place], [address], [address] [facility], [facility], [address], [address] [facility] Acute, and [place]. 1409: RN CM left a HIPPA complaint voicemail with [employer] requesting a return phone call. JERICHO CRAIG called [facility] at [phone] and left a HIPPA complaint voicemail requesting a return phone call. RN CM called [address] [place], [place] [address] but was unable to reach the admission coordinator by telephone. JERICHO CRAIG spoke with the Admissions Coordinator at [address] [place] [address] and [facility]. The facility will review the referral again today. Updated clinicals were sent to [address] [place] [address] and [facility]. RN CM spoke with Marvel Singer, the Admission Coordinator at [address] [place] [address] and requested for her to review the updated clinicals and referral. She stated she will review them this afternoon and will contact case management back at a later time. 1526: Phone [facility]. RN CM requested for her to review the referral again for possible admission. She stated she will review the referral and will call case management back at a later time.

## 2023-09-18 NOTE — PROGRESS NOTES
LTG: patient will tolerate safest, least restrictive oral diet without s/sx airway compromise  STG: Patient will tolerate  soft/bite sized diet and thin liquids without overt s/sx aspiration. Advanced   STG: Patient will tolerate ongoing po trials in efforts to advance diet. Progressing   STG: Patient will participate in instrumental swallowing assessment to objectively assess oropharyngeal swallow if clinically indicated       SPEECH LANGUAGE PATHOLOGY: DYSPHAGIA  Daily Note #4    NAME: Kathye Saint III  : 1955  MRN: [id number]    ADMISSION DATE: 9/3/2023  PRIMARY DIAGNOSIS: Severe dementia with psychotic disturbance [facility])  Delirium [R41.0]  Hospital admission due to social situation [Z60.9]  Severe dementia with psychotic disturbance, unspecified dementia type ([address]) [F03. C2]  Acute psychosis ([address]) [F23]    ICD-10: Treatment Diagnosis: R13.12 Dysphagia, Oropharyngeal Phase    RECOMMENDATIONS   Diet:  Diet Solids Recommendation: Soft & Bite Sized  Liquid Consistency Recommendation: Thin    Medications: Crushed in puree as able     Compensatory Swallowing Strategies: Small bites/sips; Remain upright for 30-45 minutes after meals;Upright as possible for all oral intake;Eat/Feed slowly; Assist feed   Recommendations: Dysphagia treatment;Assistance with meals    Therapeutic Interventions: Diet tolerance monitoring;Patient/Family education   Patient continues to require skilled intervention:  Yes. Recommend ongoing speech therapy services during this hospitalization      ASSESSMENT       Patient requiring min verbal cues to sustain alertness during breakfast meal. Remains confused. Consuming soft/bite sized textures without overt s/sx airway compromise. Not a candidate for diet advancement at this time d/t mentation. Recommend continue soft/bite sized diet and thin li liquids. Meds crushed in puree/applesauce. 1:1 assist with meals.    GENERAL    History of Present Injury/Illness: Mr. Augusto May  has a past medical history of Anxiety, BPH (benign prostatic hyperplasia), BPH (benign prostatic hypertrophy), Chest pain, Chronic prostatitis, Depression, Deviated nasal septum, DJD (degenerative joint disease), lumbosacral, Dyslipidemia, Dyspnea, Family history of coronary artery bypass surgery, GERD (gastroesophageal reflux disease), History of kidney stones, HTN (hypertension), Hypercalcemia, Hyperhomocysteinemia ([address]), Hyperlipidemia, Hypertension, Hypertriglyceridemia, Pacemaker, Pre-diabetes, Sick sinus syndrome ([address]), Syncope and collapse, Tubular adenoma, Tubular adenoma of colon, and Vitamin D deficiency. Cozemariolae Alberto He also  has a past surgical history that includes Colonoscopy (2010); orthopedic surgery; orthopedic surgery (1990); pacemaker (6/1/2009); and lipoma resection. Subjective: Seen with breakfast meal. Patient is awake, but keeping eyes closed requiring min verbal cues to sustain alertness. Reports he's at a hotel. Sister is at bedside. Sitter also present. Behavior/Cognition: Alert; Cooperative;Confused  Follows Directions: Simple       Prior Dysphagia History: per family, baseline throat clearing \"his whole life\"; nursing reports strong choking episode with medication prompted consult   Prior instrumental assessment: n/a    Current Diet : Soft and Bite-Sized  Current Liquid Diet : Thin       Respiratory Status: Room air    O2 Device: None (Room air)        Pain: Patient does not appear in pain                  OBJECTIVE           Patient Positioning: Upright in chair      Dentition: Adequate        Baseline Vocal Quality: Dysphonic    Oropharyngeal Phase:  Patient consumed ~ 6 oz thin by straw and soft eggs across >10 trials. No attempts to self feed despite encouragement. Adequate oral prep and clearing with soft chewables. No overt s/sx airway compromise with solids or liquids.                     Dysphagia Outcome and Severity Scale (PHU)  Dysphagia Outcome Severity Scale: Level 5: Mild dysphagia-

## 2023-09-18 NOTE — PROGRESS NOTES
Hospitalist Progress Note   Admit Date:  9/3/2023  8:00 PM   Name:  Abida Abrams III   Age:  70 y.o. Sex:  male  :  1955   MRN:  [id number]   Room:  [facility]/    Presenting/Chief Complaint: No chief complaint on file. Reason(s) for Admission: Delirium [R41.0]  Hospital admission due to social situation [Z60.9]  Severe dementia with psychotic disturbance, unspecified dementia type ([facility]) [F03. C2]  Acute psychosis [facility]) [address] [facility] [facility] [place] [address] Course:   Abida Abrams III is a 70 y.o. male with medical history of early onset dementia, hypertension, diet-controlled diabetes admitted with worsening confusion and hallucination likely in the setting of progressive dementia. Patient became increasingly agitated and required antipsychotic medication and four-point restraints. Transferred to ICU for Precedex drip. Lab workup and CT head no evidence of any abnormalities to explain behavior. Zyprexa was ineffective so this was stopped. Pt was started on seroquel and this also did not seem to work at 100mg doses. Depakote added. Seroquel switched to risperdal . Then switched to higher dose of zyprexa, then back to rispderal.  Numerous different medication trials and combinations attempted. Had aspiration event on 9-10.  nothing on CXR but findings lag behind clinical events. Started on unasyn and SLP consulted. Subjective & 24hr Events:   Pt seen at bedside, sleeping comfortably. He is easy to wake up. Sitter present in the room. No reported chest pain, vomiting, diarrhea, overnight events. ROS:  10 point ROS negative except what mentioned above. Assessment & Plan:         Acute psychosis with severe dementia ([address] [facility])  -  Decreased Risperdal from 1 mg p.o. twice daily to 0.5 mg in morning and 1 mg at bedtime. Also decreased Depakote from 500 mg p.o. 3 times daily to 250 mg p.o. in a.m. and 500 mg in afternoon and bedtime.   Monitor patient's mentation following this adjustment bilaterally  Abdomen:   Flat, nontender, nondistended, bowel sounds are normoactive  Extremities: No edema  Skin:     No rashes and normal coloration. Warm and dry.     Neuro:  GCS 15, cranial nerves intact, speech is normal, moving all 4 extremities spontaneously  Psych:             AOx3, flat affect      I have personally reviewed labs and tests:  Recent Labs:  Recent Results (from the past 48 hour(s))   Valproic Acid Level, Total    Collection Time: 09/16/23  8:50 AM   Result Value Ref Range    Valproic Acid 80 50 - 100 ug/ml         Current Meds:  Current Facility-Administered Medications   Medication Dose Route Frequency    risperiDONE (RISPERDAL) tablet 0.5 mg  0.5 mg Oral QAM    risperiDONE (RISPERDAL) tablet 1 mg  1 mg Oral Nightly    divalproex (DEPAKOTE SPRINKLE) DR capsule 250 mg  250 mg Oral QAM    divalproex (DEPAKOTE SPRINKLE) DR capsule 500 mg  500 mg Oral 2 times per day    miconazole (MICOTIN) 2 % powder   Topical BID    medicated lip ointment (BLISTEX)   Topical PRN    LORazepam (ATIVAN) injection 1 mg  1 mg IntraVENous Q4H PRN    haloperidol lactate (HALDOL) injection 2 mg  2 mg IntraVENous Q4H PRN    tamsulosin (FLOMAX) capsule 0.4 mg  0.4 mg Oral Daily    phenol 1.4 % mouth spray 1 spray  1 spray Mouth/Throat Q2H PRN    hydrALAZINE (APRESOLINE) injection 10 mg  10 mg IntraVENous Q4H PRN    Or    hydrALAZINE (APRESOLINE) injection 20 mg  20 mg IntraVENous Q4H PRN    melatonin tablet 3 mg  3 mg Oral Nightly    aspirin chewable tablet 81 mg  81 mg Oral Daily    donepezil (ARICEPT) tablet 10 mg  10 mg Oral Nightly    memantine (NAMENDA) tablet 10 mg  10 mg Oral BID    losartan (COZAAR) tablet 50 mg  50 mg Oral Daily    sertraline (ZOLOFT) tablet 150 mg  150 mg Oral Daily    atorvastatin (LIPITOR) tablet 10 mg  10 mg Oral Daily    glucose chewable tablet 16 g  4 tablet Oral PRN    dextrose bolus 10% 125 mL  125 mL IntraVENous PRN    Or    dextrose bolus 10% 250 mL  250 mL IntraVENous PRN    Glucagon

## 2023-09-19 PROCEDURE — 6370000000 HC RX 637 (ALT 250 FOR IP): Performed by: FAMILY MEDICINE

## 2023-09-19 PROCEDURE — 1100000000 HC RM PRIVATE

## 2023-09-19 PROCEDURE — 2580000003 HC RX 258: Performed by: FAMILY MEDICINE

## 2023-09-19 PROCEDURE — 6360000002 HC RX W HCPCS: Performed by: FAMILY MEDICINE

## 2023-09-19 PROCEDURE — 92526 ORAL FUNCTION THERAPY: CPT

## 2023-09-19 PROCEDURE — 6370000000 HC RX 637 (ALT 250 FOR IP): Performed by: HOSPITALIST

## 2023-09-19 PROCEDURE — 97530 THERAPEUTIC ACTIVITIES: CPT

## 2023-09-19 PROCEDURE — 6370000000 HC RX 637 (ALT 250 FOR IP): Performed by: INTERNAL MEDICINE

## 2023-09-19 PROCEDURE — 2500000003 HC RX 250 WO HCPCS: Performed by: HOSPITALIST

## 2023-09-19 RX ADMIN — ATORVASTATIN CALCIUM 10 MG: 10 TABLET, FILM COATED ORAL at 09:42

## 2023-09-19 RX ADMIN — ANTI-FUNGAL POWDER MICONAZOLE NITRATE TALC FREE: 1.42 POWDER TOPICAL at 09:57

## 2023-09-19 RX ADMIN — ENOXAPARIN SODIUM 40 MG: 100 INJECTION SUBCUTANEOUS at 09:50

## 2023-09-19 RX ADMIN — LOSARTAN POTASSIUM 50 MG: 50 TABLET, FILM COATED ORAL at 09:46

## 2023-09-19 RX ADMIN — SODIUM CHLORIDE, PRESERVATIVE FREE 5 ML: 5 INJECTION INTRAVENOUS at 09:54

## 2023-09-19 RX ADMIN — ASPIRIN 81 MG 81 MG: 81 TABLET ORAL at 09:42

## 2023-09-19 RX ADMIN — TAMSULOSIN HYDROCHLORIDE 0.4 MG: 0.4 CAPSULE ORAL at 09:45

## 2023-09-19 RX ADMIN — SODIUM CHLORIDE, PRESERVATIVE FREE 10 ML: 5 INJECTION INTRAVENOUS at 19:34

## 2023-09-19 RX ADMIN — Medication 3 MG: at 19:20

## 2023-09-19 RX ADMIN — RISPERIDONE 0.5 MG: 0.5 TABLET ORAL at 09:47

## 2023-09-19 RX ADMIN — DONEPEZIL HYDROCHLORIDE 10 MG: 5 TABLET, FILM COATED ORAL at 19:20

## 2023-09-19 RX ADMIN — SERTRALINE 150 MG: 100 TABLET, FILM COATED ORAL at 09:45

## 2023-09-19 RX ADMIN — DIVALPROEX SODIUM 250 MG: 125 CAPSULE, COATED PELLETS ORAL at 09:47

## 2023-09-19 RX ADMIN — RISPERIDONE 1 MG: 1 TABLET ORAL at 19:20

## 2023-09-19 RX ADMIN — DIVALPROEX SODIUM 500 MG: 125 CAPSULE, COATED PELLETS ORAL at 19:19

## 2023-09-19 RX ADMIN — MEMANTINE 10 MG: 5 TABLET ORAL at 19:20

## 2023-09-19 RX ADMIN — DIVALPROEX SODIUM 500 MG: 125 CAPSULE, COATED PELLETS ORAL at 14:49

## 2023-09-19 RX ADMIN — MEMANTINE 10 MG: 5 TABLET ORAL at 09:43

## 2023-09-19 ASSESSMENT — PAIN SCALES - GENERAL
PAINLEVEL_OUTOF10: 0

## 2023-09-19 NOTE — PROGRESS NOTES
LTG: patient will tolerate safest, least restrictive oral diet without s/sx airway compromise. MET   STG: Patient will tolerate  easy to chew diet and thin liquids without overt s/sx aspiration. Advanced . Advanced/MET   STG: Patient will tolerate ongoing po trials in efforts to advance diet. Progressing . MET   STG: Patient will participate in instrumental swallowing assessment to objectively assess oropharyngeal swallow if clinically indicated. Discontinued/not indicated        SPEECH LANGUAGE PATHOLOGY: DYSPHAGIA  Daily Note #5 and Discharge    NAME: Marci Riley III  : 1955  MRN: [id number]    ADMISSION DATE: 9/3/2023  PRIMARY DIAGNOSIS: Severe dementia with psychotic disturbance [facility])  Delirium [R41.0]  Hospital admission due to social situation [Z60.9]  Severe dementia with psychotic disturbance, unspecified dementia type ([address]) [F03. C2]  Acute psychosis ([address]) [F23]    ICD-10: Treatment Diagnosis: R13.12 Dysphagia, Oropharyngeal Phase    RECOMMENDATIONS   Diet:  Diet Solids Recommendation: Easy to Chew  Liquid Consistency Recommendation: Thin    Medications: PO     Compensatory Swallowing Strategies: Small bites/sips; Remain upright for 30-45 minutes after meals;Upright as possible for all oral intake;Eat/Feed slowly; Assist feed   Recommendations: Dysphagia treatment;Assistance with meals    Therapeutic Interventions: Diet tolerance monitoring;Patient/Family education   Patient continues to require skilled intervention:  Yes. Recommend ongoing speech therapy services during this hospitalization      ASSESSMENT       Patient with functional oral prep and clearing with chewables. Continues to require assistance with feeding, but is consuming solids and liquids without overt s/sx airway compromise. Recommend advance to easy to chew diet. Continue thin liquids. Meds as tolerated. Needs 1:1 assist with meals. No further speech therapy needs at this time.    GENERAL    History of

## 2023-09-19 NOTE — CARE COORDINATION
5482: Message received yesterday after normal business hours from [address] [place] [address] stating they did not receive a referral. RN CM called the facility today at 3238 and left a HIPPA compliant voicemail requesting a return phone call. 2484: Return phone call received from Sarah Gutiérrez Admission Coordinator at [address] [place] [address]. She requested for case management to fax the referral to [phone]. The fax number was confirmed with read back and the referral was faxed in [address] [place] [address]. 1015: Discharge planning was discussed with the Interdisciplinary Team. The patient is a potential candidate for acute inpatient rehabilitation and is pending a repeat physical therapy evaluation today. 1058: RN CM met with the patient's sisters and visitor and discussed discharge planning. They are interested in [address] [place] [address] if the patient is appropriate for acute inpatient rehabilitation. They were provided with a list of acute inpatient rehabilitation facilities and their quality metrics. If the patient is not an acute inpatient rehabilitation candidate, they are interested in [facility] and [facility] in [place]. The patient's sister Maninder Gastelum is making arrangements for the patient to reside in an assisted living facility after he completes short term rehabilitation. RN CM called [facility] and confirmed the fax number with read back, [phone]. The referral was faxed in [address] [place] [address] and is pending review. RN CM called [facility] and confirmed their fax number with read back, [phone]. The referral was faxed in [address] [place] [address] and is pending review. The family/visitors requested a cognitive evaluation and to speak with the attending MD. The physician was notified of their requests. 1120: RN CM met with the patient and family at the bedside and informed them of the referrals that were sent to [facility] and [facility].  The patient's sister Maninder Gastelum requested for case management to send referrals to all facilities in [facility]. A referral was sent to [facility] and is pending review. 1218: A referral was resent to [facility] requesting for the facility to review the referral again. New referrals were sent to [facility], [address] [facility]. RN CM called [facility] and confirmed their fax number with read back [phone]. The referral was faxed in [address] [place] [address]. RN CM called [facility] and left a HIPPA compliant voicemail with the admission coordinator requesting a return phone call. RN CM called [facility] and confirmed their fax number with read back, [phone]. The referral was faxed in [address] [place] [address] and is pending review. RN CM called [facility] and confirmed their fax number with read back, [phone]. The referral was faxed in [address] [place] [address]. RN CM called [facility] and confirmed their fax number with read back, [phone], and faxed the referral in [address] [place] [address]. RN CM called [facility] and confirmed their fax number with read back, [phone], and faxed the referral in [address] [place] [address]. RN CM called [facility] and confirmed their fax number with read back, [phone], and faxed the referral in [address] [place] [address]. RN CM called [facility] and left a HIPPA compliant voicemail requesting a return phone call. RN CM called [facility] and confirmed their fax number with read back, [phone], and faxed the referral in [address] [place] [address]. RN CM called [address] [place] [address] and confirmed their fax number with read back, [phone], and faxed the referral in [address] [place] [address]. RN CM called [facility] and left a HIPPA compliant voicemail requesting a return phone call. Updated clinicals were sent to [employer].  RN CM spoke with the liaison at [employer] and requested for the liaison

## 2023-09-19 NOTE — PROGRESS NOTES
ACUTE PHYSICAL THERAPY GOALS:   (Developed with and agreed upon by patient and/or caregiver.)  GOALS MODIFIED 9/10/23 :  1) Bed mobility with SBA, flat bed no rail. 2) transfers with walker & CGA. 3) pt ambulating 200-250ft with rolling walker & CGA    PHYSICAL THERAPY: Daily Note AM   (Link to Caseload Tracking: PT Visit Days : 6  Time In/Out PT Charge Capture  Rehab Caseload Tracker  Orders    Claire Manzano is a 70 y.o. male   PRIMARY DIAGNOSIS: Severe dementia with psychotic disturbance ([address])  Delirium [R41.0]  Hospital admission due to social situation [Z60.9]  Severe dementia with psychotic disturbance, unspecified dementia type ([address]) [F03. C2]  Acute psychosis ([address]) [F23]       Inpatient: Payor: [address] [place] [address] / Plan: MEDICARE PART A AND B / Product Type: *No Product type* /     ASSESSMENT:     REHAB RECOMMENDATIONS:   Recommendation to date pending progress:  Setting:  Short-term Rehab    Equipment:    To Be Determined     ASSESSMENT:  Mr. Jasmina Garcia was pleasantly confused but cooperative this am.  Session limited by meal arrival but patient able to get himself to the edge of the bed without assist and then stand and transfer to the recliner with CGA-min A. Patient set up with his meal but did not initiate any self-feeding. Patient required hand over hand assist.  He was left up in the chair with sitter present to help him continue with his meal.  Will continue therapy during admission. If family plans to transfer him to an [facility], he would need a memory care facility or a higher level of care at the [facility]. 9/19 supine upon arrival.  Work on bed mobility as follows:supine>scooting>eob with SBA and verbal cues. Sat on eob and work on balance without support. Sit>stand with CGA @ walker. Ambulated 76 ft with gait belt, Min A  and RW with Nursing student push chair. Pt needs a lot of verbal cues for gait sequence, stand tall and stay focused. Rested few min in the chair.   Ambulated another 75 ft back to the Assistance, CGA=Contact [employer],   Min=Minimal Assistance, Mod=Moderate Assistance, Max=Maximal Assistance, Total=Total Assistance, NT=Not Tested    PLAN:   FREQUENCY AND DURATION: Daily for duration of hospital stay or until stated goals are met, whichever comes first.    TREATMENT:   TREATMENT:   Therapeutic Activity (43 Minutes): Therapeutic activity included Supine to Sit, Scooting, Transfer Training, Ambulation on level ground, Sitting balance , and Standing balance to improve functional Activity tolerance, Balance, Coordination, Mobility, and Strength.     TREATMENT GRID:  N/A    AFTER TREATMENT PRECAUTIONS: Bed/Chair Locked, Call light within reach, Chair, Needs within reach, and Virtual     INTERDISCIPLINARY COLLABORATION:      EDUCATION: Education Given To: Patient  Education Provided: Role of Therapy  Education Method: Demonstration;Verbal  Education Outcome: Verbalized understanding;Demonstrated understanding;Continued education needed    TIME IN/OUT:  Time In: 1120  Time Out: 1430 [facility]  Minutes: [address], PTA

## 2023-09-20 VITALS
WEIGHT: 186.6 LBS | HEART RATE: 74 BPM | OXYGEN SATURATION: 92 % | HEIGHT: 75 IN | DIASTOLIC BLOOD PRESSURE: 85 MMHG | SYSTOLIC BLOOD PRESSURE: 142 MMHG | BODY MASS INDEX: 23.2 KG/M2 | RESPIRATION RATE: 15 BRPM | TEMPERATURE: 98.4 F

## 2023-09-20 PROBLEM — R41.0 DELIRIUM: Status: RESOLVED | Noted: 2023-09-07 | Resolved: 2023-09-20

## 2023-09-20 PROBLEM — R41.89 COGNITIVE AND BEHAVIORAL CHANGES: Status: RESOLVED | Noted: 2023-09-06 | Resolved: 2023-09-20

## 2023-09-20 PROBLEM — R46.89 COGNITIVE AND BEHAVIORAL CHANGES: Status: RESOLVED | Noted: 2023-09-06 | Resolved: 2023-09-20

## 2023-09-20 PROBLEM — F23 ACUTE PSYCHOSIS (HCC): Status: RESOLVED | Noted: 2023-09-04 | Resolved: 2023-09-20

## 2023-09-20 LAB
SARS-COV-2 RDRP RESP QL NAA+PROBE: NOT DETECTED
SOURCE: NORMAL

## 2023-09-20 PROCEDURE — 6360000002 HC RX W HCPCS: Performed by: FAMILY MEDICINE

## 2023-09-20 PROCEDURE — 6370000000 HC RX 637 (ALT 250 FOR IP): Performed by: HOSPITALIST

## 2023-09-20 PROCEDURE — 2580000003 HC RX 258: Performed by: FAMILY MEDICINE

## 2023-09-20 PROCEDURE — 97535 SELF CARE MNGMENT TRAINING: CPT

## 2023-09-20 PROCEDURE — 6370000000 HC RX 637 (ALT 250 FOR IP): Performed by: INTERNAL MEDICINE

## 2023-09-20 PROCEDURE — 6370000000 HC RX 637 (ALT 250 FOR IP): Performed by: FAMILY MEDICINE

## 2023-09-20 PROCEDURE — 97530 THERAPEUTIC ACTIVITIES: CPT

## 2023-09-20 PROCEDURE — 87635 SARS-COV-2 COVID-19 AMP PRB: CPT

## 2023-09-20 RX ORDER — RISPERIDONE 1 MG/1
1 TABLET ORAL NIGHTLY
Qty: 30 TABLET | Refills: 0 | Status: SHIPPED | OUTPATIENT
Start: 2023-09-20 | End: 2023-10-16 | Stop reason: SDUPTHER

## 2023-09-20 RX ORDER — DIVALPROEX SODIUM 125 MG/1
CAPSULE, COATED PELLETS ORAL
Qty: 300 CAPSULE | Refills: 0 | Status: SHIPPED | OUTPATIENT
Start: 2023-09-20 | End: 2023-10-16 | Stop reason: SDUPTHER

## 2023-09-20 RX ORDER — LANOLIN ALCOHOL/MO/W.PET/CERES
3 CREAM (GRAM) TOPICAL NIGHTLY
Qty: 30 TABLET | Refills: 0 | Status: SHIPPED | OUTPATIENT
Start: 2023-09-20

## 2023-09-20 RX ORDER — TAMSULOSIN HYDROCHLORIDE 0.4 MG/1
0.4 CAPSULE ORAL DAILY
Qty: 30 CAPSULE | Refills: 0 | Status: SHIPPED | OUTPATIENT
Start: 2023-09-21 | End: 2023-10-16 | Stop reason: SDUPTHER

## 2023-09-20 RX ORDER — DONEPEZIL HYDROCHLORIDE 10 MG/1
10 TABLET, FILM COATED ORAL NIGHTLY
Qty: 30 TABLET | Refills: 0 | Status: SHIPPED | OUTPATIENT
Start: 2023-09-20 | End: 2023-10-16 | Stop reason: SDUPTHER

## 2023-09-20 RX ORDER — RISPERIDONE 0.5 MG/1
0.5 TABLET ORAL EVERY MORNING
Qty: 30 TABLET | Refills: 0 | Status: SHIPPED | OUTPATIENT
Start: 2023-09-21 | End: 2023-10-16 | Stop reason: SDUPTHER

## 2023-09-20 RX ADMIN — LOSARTAN POTASSIUM 50 MG: 50 TABLET, FILM COATED ORAL at 09:00

## 2023-09-20 RX ADMIN — TAMSULOSIN HYDROCHLORIDE 0.4 MG: 0.4 CAPSULE ORAL at 09:00

## 2023-09-20 RX ADMIN — DIVALPROEX SODIUM 250 MG: 125 CAPSULE, COATED PELLETS ORAL at 08:59

## 2023-09-20 RX ADMIN — MEMANTINE 10 MG: 5 TABLET ORAL at 08:59

## 2023-09-20 RX ADMIN — ENOXAPARIN SODIUM 40 MG: 100 INJECTION SUBCUTANEOUS at 09:00

## 2023-09-20 RX ADMIN — ATORVASTATIN CALCIUM 10 MG: 10 TABLET, FILM COATED ORAL at 08:59

## 2023-09-20 RX ADMIN — ASPIRIN 81 MG 81 MG: 81 TABLET ORAL at 09:00

## 2023-09-20 RX ADMIN — SODIUM CHLORIDE, PRESERVATIVE FREE 10 ML: 5 INJECTION INTRAVENOUS at 09:00

## 2023-09-20 RX ADMIN — SERTRALINE 150 MG: 100 TABLET, FILM COATED ORAL at 08:59

## 2023-09-20 RX ADMIN — RISPERIDONE 0.5 MG: 0.5 TABLET ORAL at 09:00

## 2023-09-20 NOTE — CARE COORDINATION
1000: Discharge planning was discussed with the Interdisciplinary Team. Per the attending MD, the patient is medically ready for discharge. 1005: RN CM informed the Admission Coordinator at [facility] that the patient has had a virtual sitter at night. She confirmed they can still admit the patient today. 1148: RN CM met with the patient and his family at the bedside and discussed discharge planning. They were provided with a list of facilities and quality metrics from [email or web address] go[employer] for all of the facilities that received referrals. The patient's sister called Nyasia Rodriguez and placed her on speaker phone. RN CM informed them of the bed offer at [facility]. [facility] accepted the bed offer. The patient is a potential discharge today. RN CM encouraged the patient and family to ask questions. They verbalized understanding and agreement with the discharge plan. The facility was notified of their acceptance. Discharge planning was discussed with the attending MD.    Medical transport was arranged for today at 1430. The primary nurse can call report to [phone], room 520A. The report information was given to the nursing staff. The facility and the patient's sister Lisbet Warren were notified of the anticipated transport time. Clinicals were faxed to [facility]. 1413: Discharge planning was discussed with the psychiatry NP. Per the NP, the patient would benefit from a referral to [facility]. RN CM called [facility] and confirmed their fax number with read back, [phone], and faxed the referral in Epic. [facility] confirmed they have psychiatry services at their facility. The AVS and Discharge Summary were faxed to the facility in [address] [place] [address].  Discharge planning was discussed with the attending MD.

## 2023-09-20 NOTE — PROGRESS NOTES
ACUTE OCCUPATIONAL THERAPY GOALS:   (Developed with and agreed upon by patient and/or caregiver.)  1. Patient will perform grooming with min assist.-GOAL MET 9/10/23    2. Patient will perform upper body dressing with min assist. -GOAL MET 9/10/23    3. Patient will perform lower body dressing with min assist.  4. Patient will perform bathing with min assist.  5. Patient will perform toileting and toilet transfer with min assist.  6. Patient will perform ADL functional mobility and tranfers in room with min assist.  PROGRESSING 9/14/23   7. Patient to follow simple directions with mod verbal and tactile cues. -GOAL MET 9/14/23      Goals to be achieved in 7 days. OCCUPATIONAL THERAPY Daily Note and AM       OT Visit Days: 6  Acknowledge Orders  Time  OT Charge Capture  Rehab Caseload Tracker      Temitope Muro is a 70 y.o. male   PRIMARY DIAGNOSIS: Severe dementia with psychotic disturbance ([address])  Delirium [R41.0]  Hospital admission due to social situation [Z60.9]  Severe dementia with psychotic disturbance, unspecified dementia type ([address]) [F03. C2]  Acute psychosis ([address]) [F23]       Reason for Referral: Other lack of cordination (R27.8)  Difficulty in walking, Not elsewhere classified (R26.2)  Other abnormalities of gait and mobility (R26.89)  Inpatient: Payor: MEDICARE / Plan: MEDICARE PART A AND B / Product Type: *No Product type* /     ASSESSMENT:     REHAB RECOMMENDATIONS:   Recommendation to date pending progress:  Setting:  Short-term Rehab    Equipment:    To Be Determined     ASSESSMENT:  Mr. Nancy Erwin presents supine in bed with sister and sitter at bedside. Restraints not attached. Pt easier to arouse today and was better able to participate in session. He performed supine to sit with SBA and was able to maintain sitting balance at EOB with SPV and occasional cues. At EOB, he was able to comb hair and wash face with verbal cues.  He performed sit<>stand with min-mod A and required mod A for IMPAIRED   (See Comments)   UE AROM [x] []   UE PROM [] []   Strength []  Decreased      Posture / Balance []  Fair/poor in standing   Sensation []     Coordination []  Impaired, but today able to manage comb and wash cloth; B hands look somewhat swollen     Tone []       Edema []    Activity Tolerance []  Fair with mobility     Hand Dominance R [x] L []      COGNITION/  PERCEPTION: INTACT IMPAIRED   (See Comments)   Orientation []  Oriented to self   Vision []     Hearing []     Cognition  []  Follows simple commands moderately with verbal, tactile and manual cues   Perception []       MOBILITY: I Mod I S SBA CGA Min Mod Max Total  NT x2 Comments:   Bed Mobility    Rolling [] [] [] [] [] [] [] [] [] [] []    Supine to Sit [] [] [] [x] [] [] [] [] [] [] []    Scooting [] [] [] [x] [] [] [] [] [] [] []    Sit to Supine [] [] [] [] [] [] [] [] [] [] []    Transfers    Sit to Stand [] [] [] [] [] [x] [] [] [] [] []    Bed to Chair [] [] [] [] [] [x] [] [] [] [] [] RW   Stand to Sit [] [] [] [] [] [x] [] [] [] [] []    Tub/Shower [] [] [] [] [] [] [] [] [] [] []     Toilet [] [] [] [] [] [] [] [] [] [] []      [] [] [] [] [] [] [] [] [] [] []    I=Independent, Mod I=Modified Independent, S=Supervision/Setup, SBA=Standby Assistance, CGA=Contact Guard Assistance, Min=Minimal Assistance, Mod=Moderate Assistance, Max=Maximal Assistance, Total=Total Assistance, NT=Not Tested    ACTIVITIES OF DAILY LIVING: I Mod I S SBA CGA Min Mod Max Total NT Comments   BASIC ADLs:              Upper Body Bathing  [] [] [] [] [] [] [] [] [x] []     Lower Body Bathing [] [] [] [] [] [] [] [] [x] []     Toileting [] [] [] [] [] [] [] [] [x] [] gaffney catheter   Upper Body Dressing [] [] [x] [] [] [] [] [] [] [] gown   Lower Body Dressing [] [] [] [] [] [] [] [x] [] [] [place] shoes   Feeding [] [] [] [] [] [] [] [] [] []    Grooming [] [] [x] [] [] [] [] [] [] [] Washed face and combed hair while seated edge of bed    Personal Device Care [] [] [] [] [] [] [] [] [] []    Functional Mobility [] [] [] [] [] [x] [] [] [] []  with rolling walker    I=Independent, Mod I=Modified Independent, S=Supervision/Setup, SBA=Standby Assistance, CGA=Contact Guard Assistance, Min=Minimal Assistance, Mod=Moderate Assistance, Max=Maximal Assistance, Total=Total Assistance, NT=Not Tested    PLAN:   FREQUENCY/DURATION     for duration of hospital stay or until stated goals are met, whichever comes first.    PROBLEM LIST:   (Skilled intervention is medically necessary to address:)  Decreased ADL/Functional Activities  Decreased Activity Tolerance  Decreased Balance  Decreased Cognition  Decreased Coordination  Decreased Gait Ability  Decreased Safety Awareness  Decreased Strength  Decreased Transfer Abilities   INTERVENTIONS PLANNED:  (Benefits and precautions of occupational therapy have been discussed with the patient.)  Self Care Training  Therapeutic Activity  Therapeutic Exercise/HEP  Neuromuscular Re-education  Education         TREATMENT:       TREATMENT:   Co-Treatment PT/OT necessary due to patient's decreased overall endurance/tolerance levels, as well as need for high level skilled assistance to complete functional transfers/mobility and functional tasks  Self Care (25 minutes): Patient participated in upper body dressing and lower body dressing ADLs in unsupported sitting and standing with minimal verbal, manual, and tactile cueing to decrease assistance required, increase activity tolerance, and increase safety awareness. Patient also participated in functional mobility, functional transfer, and adaptive equipment training to decrease assistance required, increase activity tolerance, and increase safety awareness.       TREATMENT GRID:  N/A    AFTER TREATMENT PRECAUTIONS: Alarm Activated, Bed/Chair Locked, Call light within reach, Chair, Needs within reach, RN notified, and sitter at bedside    INTERDISCIPLINARY COLLABORATION:  RN/ PCT and PT/

## 2023-09-20 NOTE — CARE COORDINATION
09/20/23 1409 [address] [place] [address] Family Members   PCP Verified by CM Yes   Prior Functional Level Independent in ADLs/IADLs   Current Functional Level Assistance with the following:   Financial Resources [employer] None   Social/Functional History   Lives With Alone   Type of [address] None   ADL Assistance Independent   Discharge Planning   Type of [address] Alone   Current Services Prior To Admission None   Potential [address]   DME Ordered? No   Potential Assistance Purchasing Medications No   Type of Home Care Services None   Patient expects to be discharged to: [facility] Discharge   Transition of Care Consult (CM Consult) Discharge Planning   Services At/After Discharge [facility] (SNF)   Mode of Transport at Discharge [address] [facility] [address] Time of Discharge 1430   Condition of Participation: Discharge Planning   The Patient and/or Patient Representative was provided with a Choice of Provider? Patient;Patient Representative   Name of the Patient Representative who was provided with the Choice of Provider and agrees with the Discharge Plan? Helyn Sink   The Patient and/Or Patient Representative agree with the Discharge Plan? Yes   Freedom of Choice list was provided with basic dialogue that supports the patient's individualized plan of care/goals, treatment preferences, and shares the quality data associated with the providers? Yes     The patient is discharging to [facility]. Orders and clinicals were placed in the patient's discharge packet and were also faxed to the facility in [address] [place] [address]. The report information was given to the primary nurse. The patient and family verbalized understanding and agreement with the discharge plan. She confirmed she is in agreement for him to discharge today.     The Important Message from [facility] letter was delivered and explained to the patient's sister Ty Schroeder. She verbalized understanding of the information and signed the letter. The patient/family were given a copy of the letter and the original was placed in the chart.

## 2023-09-20 NOTE — PROGRESS NOTES
ACUTE PHYSICAL THERAPY GOALS:   (Developed with and agreed upon by patient and/or caregiver.)  GOALS MODIFIED 9/10/23 :  1) Bed mobility with SBA, flat bed no rail. 2) transfers with walker & CGA. 3) pt ambulating 200-250ft with rolling walker & CGA    Continue with goals 9/20/23    PHYSICAL THERAPY: Daily Note AM   (Link to Caseload Tracking: PT Visit Days : 1  Time In/Out PT Charge Capture  Rehab Caseload Tracker  Orders    Kris Tsang is a 70 y.o. male   PRIMARY DIAGNOSIS: Severe dementia with psychotic disturbance ([address])  Delirium [R41.0]  Hospital admission due to social situation [Z60.9]  Severe dementia with psychotic disturbance, unspecified dementia type ([address]) [F03. C2]  Acute psychosis ([address]) [F23]       Inpatient: Payor: Josephine Lo / Plan: MEDICARE PART A AND B / Product Type: *No Product type* /     ASSESSMENT:     REHAB RECOMMENDATIONS:   Recommendation to date pending progress:  Setting:  Short-term Rehab    Equipment:    To Be Determined     ASSESSMENT:  Mr. Ester Khalil was pleasantly confused but cooperative this am.  He was making some conversation when questions were asked but quiet most of the time. He was agreeable to get up. He sat on the side of bed SBA with head of bed up. He needed min assistance to stand and min assistance to ambulate. He increased his gait distance with RW but needed constant verbal and manual cues to guide the RW and to prevent falling. He tends to have a very narrow HARINDER and even scissor his LE's at times with gait. He returned to bedside recliner but he did not like the chair. He returned to bed. He is making progress. He is certainly not safe on his feet without assistance. He would benefit from STR. Continue with above goals. SUBJECTIVE:   Mr. Ester Khalil agreeable.     Social/Functional Lives With: Alone  Type of Home: House  Bathroom Equipment: None  Home Equipment: None  ADL Assistance: Independent  OBJECTIVE:     PAIN: Naheed Bake / O2: Iliana Megan / Kamari Elder /

## 2023-09-20 NOTE — CARE COORDINATION
1510: Message received September 19, 2023 after normal business hours from [facility] stating they can accept the patient for services. RN RAFA returned the Admission Coordinator's call today at [phone] and left a HIPPA compliant voicemail requesting a return phone call. 0940: Return phone call received from Navin May at [facility]. She confirmed she received clinicals on the patient yesterday and can admit him today if he is medically ready for discharge and the patient/family accept the bed offer. She requested for case management to fax over updated therapy notes and a demographics sheet to [phone]. The fax number was confirmed with read back and the clinicals were sent as requested. He will need to arrive at the facility by 1800 if at all possible.

## 2023-09-20 NOTE — PROGRESS NOTES
IV removed and cath tip intact. Scripts sent with pt. Report given to [facility] at [facility].

## 2023-09-20 NOTE — DISCHARGE INSTRUCTIONS
Take medications as prescribed. Follow up with urology and psychiatry. Notify a provider immediately for any worsening symptoms.

## 2023-09-21 ENCOUNTER — CARE COORDINATION (OUTPATIENT)
Dept: CARE COORDINATION | Facility: CLINIC | Age: 68
End: 2023-09-21

## 2023-09-21 NOTE — CARE COORDINATION
Transition of care outreach postponed for 14 days due to patient's discharge to [facility] for short term rehab

## 2023-09-28 ENCOUNTER — TELEPHONE (OUTPATIENT)
Dept: NEUROLOGY | Age: 68
End: 2023-09-28

## 2023-09-28 NOTE — TELEPHONE ENCOUNTER
Pt's power of  called and said that he is taking 10 pills of Depakote a day and is acting like a zombie he just got out of the hospital

## 2023-10-02 ENCOUNTER — OFFICE VISIT (OUTPATIENT)
Dept: NEUROLOGY | Age: 68
End: 2023-10-02
Payer: MEDICARE

## 2023-10-02 VITALS
BODY MASS INDEX: 24.92 KG/M2 | WEIGHT: 200.4 LBS | HEIGHT: 75 IN | DIASTOLIC BLOOD PRESSURE: 70 MMHG | OXYGEN SATURATION: 95 % | HEART RATE: 70 BPM | SYSTOLIC BLOOD PRESSURE: 110 MMHG

## 2023-10-02 DIAGNOSIS — F03.C0 SEVERE DEMENTIA WITHOUT BEHAVIORAL DISTURBANCE, PSYCHOTIC DISTURBANCE, MOOD DISTURBANCE, OR ANXIETY, UNSPECIFIED DEMENTIA TYPE (HCC): ICD-10-CM

## 2023-10-02 DIAGNOSIS — R41.3 MEMORY LOSS: Primary | ICD-10-CM

## 2023-10-02 PROBLEM — G30.9 ALZHEIMER'S DISEASE, UNSPECIFIED (CODE) (HCC): Status: ACTIVE | Noted: 2023-10-02

## 2023-10-02 PROCEDURE — 3017F COLORECTAL CA SCREEN DOC REV: CPT | Performed by: PSYCHIATRY & NEUROLOGY

## 2023-10-02 PROCEDURE — 99215 OFFICE O/P EST HI 40 MIN: CPT | Performed by: PSYCHIATRY & NEUROLOGY

## 2023-10-02 PROCEDURE — 1111F DSCHRG MED/CURRENT MED MERGE: CPT | Performed by: PSYCHIATRY & NEUROLOGY

## 2023-10-02 PROCEDURE — 3074F SYST BP LT 130 MM HG: CPT | Performed by: PSYCHIATRY & NEUROLOGY

## 2023-10-02 PROCEDURE — G8484 FLU IMMUNIZE NO ADMIN: HCPCS | Performed by: PSYCHIATRY & NEUROLOGY

## 2023-10-02 PROCEDURE — 3078F DIAST BP <80 MM HG: CPT | Performed by: PSYCHIATRY & NEUROLOGY

## 2023-10-02 PROCEDURE — G8427 DOCREV CUR MEDS BY ELIG CLIN: HCPCS | Performed by: PSYCHIATRY & NEUROLOGY

## 2023-10-02 PROCEDURE — 1036F TOBACCO NON-USER: CPT | Performed by: PSYCHIATRY & NEUROLOGY

## 2023-10-02 PROCEDURE — G8419 CALC BMI OUT NRM PARAM NOF/U: HCPCS | Performed by: PSYCHIATRY & NEUROLOGY

## 2023-10-02 PROCEDURE — 1123F ACP DISCUSS/DSCN MKR DOCD: CPT | Performed by: PSYCHIATRY & NEUROLOGY

## 2023-10-02 ASSESSMENT — ENCOUNTER SYMPTOMS
GASTROINTESTINAL NEGATIVE: 1
EYES NEGATIVE: 1
RESPIRATORY NEGATIVE: 1

## 2023-10-02 NOTE — PROGRESS NOTES
Drug use: No    Sexual activity: Not on file   Other Topics Concern    Not on file   Social History Narrative    Not on file     Social Determinants of Health     Financial Resource Strain: Low Risk  (2/21/2023)    Overall Financial Resource Strain (CARDIA)     Difficulty of Paying Living Expenses: Not hard at all   Food Insecurity: No Food Insecurity (2/21/2023)    Hunger Vital Sign     Worried About Running Out of Food in the Last Year: Never true     Ran Out of Food in the Last Year: Never true   Transportation Needs: Unknown (2/21/2023)    PRAPARE - Transportation     Lack of Transportation (Medical): Not on file     Lack of Transportation (Non-Medical): No   Physical Activity: Sufficiently Active (7/24/2023)    Exercise Vital Sign     Days of Exercise per Week: 5 days     Minutes of Exercise per Session: 90 min   Stress: Not on file   Social Connections: Not on file   Intimate Partner Violence: Not on file   Housing Stability: Unknown (2/21/2023)    Housing Stability Vital Sign     Unable to Pay for Housing in the Last Year: Not on file     Number of Places Lived in the Last Year: Not on file     Unstable Housing in the Last Year: No       Family History:   Family History   Problem Relation Age of Onset    Heart Disease Mother     Diabetes Mother        Current Outpatient Medications on File Prior to Visit   Medication Sig Dispense Refill    divalproex (DEPAKOTE SPRINKLE) 125 MG DR capsule Take 2 capsules by mouth every morning AND 4 capsules Daily with lunch AND 4 capsules Daily with supper.  300 capsule 0    sertraline (ZOLOFT) 50 MG tablet Take 3 tablets by mouth daily 30 tablet 0    risperiDONE (RISPERDAL) 0.5 MG tablet Take 1 tablet by mouth every morning 30 tablet 0    risperiDONE (RISPERDAL) 1 MG tablet Take 1 tablet by mouth nightly 30 tablet 0    melatonin 3 MG TABS tablet Take 1 tablet by mouth at bedtime 30 tablet 0    tamsulosin (FLOMAX) 0.4 MG capsule Take 1 capsule by mouth daily 30 capsule 0

## 2023-10-03 ENCOUNTER — TELEPHONE (OUTPATIENT)
Dept: FAMILY MEDICINE CLINIC | Facility: CLINIC | Age: 68
End: 2023-10-03

## 2023-10-03 NOTE — TELEPHONE ENCOUNTER
Pt sister called in looking for a fax from living assistance.  Please check to see if we this has come through

## 2023-10-05 ENCOUNTER — CARE COORDINATION (OUTPATIENT)
Dept: CARE COORDINATION | Facility: CLINIC | Age: 68
End: 2023-10-05

## 2023-10-05 ENCOUNTER — TELEPHONE (OUTPATIENT)
Dept: FAMILY MEDICINE CLINIC | Facility: CLINIC | Age: 68
End: 2023-10-05

## 2023-10-05 NOTE — TELEPHONE ENCOUNTER
----- Message from Vinny Kumar sent at 10/2/2023  3:26 PM EDT -----  Subject: Message to Provider    QUESTIONS  Information for Provider? Pt needs to have a TB test done. Please call pts   sister, Marcell Nava to schedule. She is on VANESSA.   ---------------------------------------------------------------------------  --------------  CALL BACK INFO  [phone]; OK to leave message on voicemail  ---------------------------------------------------------------------------  --------------  SCRIPT ANSWERS  Relationship to Patient? Other/Third Party  Representative Name? Larisa Meier  Is the representative on the Communication Release of Information (VANESSA)   form in Epic?  Yes

## 2023-10-05 NOTE — TELEPHONE ENCOUNTER
Patient had TB tests while he was in the hospital last month. Can you look at those results and fill out a TB results form based off those? If not, Gaurav Pérez is checking to see if the facility will accept Quantifer[facility]. Patient also needs forms completed for assisted living by Dr Michelle Jacome.

## 2023-10-10 LAB
GLUCOSE BLD STRIP.AUTO-MCNC: 128 MG/DL (ref 65–100)
GLUCOSE BLD STRIP.AUTO-MCNC: 164 MG/DL (ref 65–100)

## 2023-10-11 ENCOUNTER — CARE COORDINATION (OUTPATIENT)
Dept: CARE COORDINATION | Facility: CLINIC | Age: 68
End: 2023-10-11

## 2023-10-11 NOTE — CARE COORDINATION
CTN made follow up request for return call to patient family for referral for Geriatrics. Patient had not been contacted after d/c. CTN contacted referral specialists and now patient has been contacted and scheduled for Geriatrics. CTN to remain available for PRN until end of OTC program episode of 30 days post d/c.

## 2023-10-16 DIAGNOSIS — J34.89 NASAL DRAINAGE: ICD-10-CM

## 2023-10-16 DIAGNOSIS — I10 PRIMARY HYPERTENSION: ICD-10-CM

## 2023-10-16 DIAGNOSIS — E78.5 DYSLIPIDEMIA: ICD-10-CM

## 2023-10-16 DIAGNOSIS — Z82.49 FAMILY HISTORY OF CORONARY ARTERY BYPASS SURGERY: ICD-10-CM

## 2023-10-16 RX ORDER — OLMESARTAN MEDOXOMIL 20 MG/1
20 TABLET ORAL DAILY
Qty: 90 TABLET | Refills: 1 | Status: SHIPPED | OUTPATIENT
Start: 2023-10-16

## 2023-10-16 RX ORDER — RISPERIDONE 0.5 MG/1
0.5 TABLET ORAL EVERY MORNING
Qty: 30 TABLET | Refills: 0 | Status: SHIPPED | OUTPATIENT
Start: 2023-10-16

## 2023-10-16 RX ORDER — SIMVASTATIN 10 MG
10 TABLET ORAL NIGHTLY
Qty: 90 TABLET | Refills: 1 | Status: SHIPPED | OUTPATIENT
Start: 2023-10-16

## 2023-10-16 RX ORDER — MEMANTINE HYDROCHLORIDE 10 MG/1
10 TABLET ORAL 2 TIMES DAILY
Qty: 180 TABLET | Refills: 3 | Status: SHIPPED | OUTPATIENT
Start: 2023-10-16

## 2023-10-16 RX ORDER — IPRATROPIUM BROMIDE 21 UG/1
2 SPRAY, METERED NASAL EVERY 12 HOURS
Qty: 30 ML | Refills: 5 | Status: SHIPPED | OUTPATIENT
Start: 2023-10-16

## 2023-10-16 RX ORDER — DIVALPROEX SODIUM 125 MG/1
CAPSULE, COATED PELLETS ORAL
Qty: 300 CAPSULE | Refills: 0 | Status: SHIPPED | OUTPATIENT
Start: 2023-10-16 | End: 2023-11-15

## 2023-10-16 RX ORDER — DONEPEZIL HYDROCHLORIDE 10 MG/1
10 TABLET, FILM COATED ORAL NIGHTLY
Qty: 30 TABLET | Refills: 0 | Status: SHIPPED | OUTPATIENT
Start: 2023-10-16

## 2023-10-16 RX ORDER — RISPERIDONE 1 MG/1
1 TABLET ORAL NIGHTLY
Qty: 30 TABLET | Refills: 0 | Status: SHIPPED | OUTPATIENT
Start: 2023-10-16

## 2023-10-16 RX ORDER — TAMSULOSIN HYDROCHLORIDE 0.4 MG/1
0.4 CAPSULE ORAL DAILY
Qty: 30 CAPSULE | Refills: 0 | Status: SHIPPED | OUTPATIENT
Start: 2023-10-16

## 2023-10-16 NOTE — TELEPHONE ENCOUNTER
Pt requested all medications prescribed to be filled--pt's caregiver left several voicemails saying he urgently needs these filled

## 2023-10-18 ENCOUNTER — TELEPHONE (OUTPATIENT)
Dept: NEUROLOGY | Age: 68
End: 2023-10-18

## 2023-10-18 RX ORDER — SERTRALINE HYDROCHLORIDE 100 MG/1
TABLET, FILM COATED ORAL
Qty: 135 TABLET | Refills: 3 | Status: SHIPPED | OUTPATIENT
Start: 2023-10-18

## 2023-10-20 ENCOUNTER — CARE COORDINATION (OUTPATIENT)
Dept: CARE COORDINATION | Facility: CLINIC | Age: 68
End: 2023-10-20

## 2023-10-20 NOTE — CARE COORDINATION
Patient has graduated from the Care Transitions program on 10/20. Patient/family has the ability to self-manage at this time. Patient has no further care management needs, no referral to the [facility] team for further management. No readmission within 30 days, CTN PRN chart review after initial JANN call. Patient has Care Transition Nurse's contact information for any further questions, concerns, or needs.   Patients upcoming visits:    Future Appointments   Date Time Provider [address] [place] [address] Ct   12/1/2023 10:00 AM NANDINI Schafer [id number] AMB   2/2/2024  1:30 PM Gil JACKSON DO [facility] AMB

## 2023-10-26 ENCOUNTER — CLINICAL DOCUMENTATION (OUTPATIENT)
Dept: NEUROLOGY | Age: 68
End: 2023-10-26

## 2023-10-26 NOTE — PROGRESS NOTES
The patient's blood studies from [facility] show the amyloid 40:40 ratio was 0.204 making it a lower risk and the ApoE it was at the higher risk.

## 2023-11-03 DIAGNOSIS — J34.89 NASAL DRAINAGE: ICD-10-CM

## 2023-11-05 RX ORDER — IPRATROPIUM BROMIDE 21 UG/1
2 SPRAY, METERED NASAL EVERY 12 HOURS
Qty: 30 ML | Refills: 5 | Status: SHIPPED | OUTPATIENT
Start: 2023-11-05

## 2023-11-08 NOTE — PROGRESS NOTES
Physician Progress Note      Pebbles Keenan  CSN #:                  [id number]  :                       1955  ADMIT DATE:       9/3/2023 8:00 PM  [facility] DATE:        2023 3:21 PM  RESPONDING  PROVIDER #:        Jaziel Salcido DO          QUERY TEXT:    Pt admitted with Acute psychosis. Pt noted to have documentation of vascular   dementia. If possible, can the cause of the vascular dementia be further   specified: The medical record reflects the following:  Risk Factors: hx HTN, DM, vascular dementia  Clinical Indicators: Per history and physical, worsening confusion and   hallucination. CTH No acute abnormality. Treatment: ICU admission, Precedex drip, restraints, medication changes, psych   consult  Options provided:  -- Vascular dementia due to, please specify. -- Other - I will add my own diagnosis  -- Disagree - Not applicable / Not valid  -- Disagree - Clinically unable to determine / Unknown  -- Refer to Clinical Documentation Reviewer    PROVIDER RESPONSE TEXT:    This patient has vascular dementia due to HTN, HLD    Query created by: Zachary Hernandez on 2023 9:46 AM      Electronically signed by:   Jaziel Salcido DO 2023 8:00 PM

## 2023-11-09 ENCOUNTER — OFFICE VISIT (OUTPATIENT)
Dept: UROLOGY | Age: 68
End: 2023-11-09
Payer: MEDICARE

## 2023-11-09 VITALS
HEART RATE: 80 BPM | DIASTOLIC BLOOD PRESSURE: 94 MMHG | SYSTOLIC BLOOD PRESSURE: 144 MMHG | HEIGHT: 75 IN | BODY MASS INDEX: 24.87 KG/M2 | OXYGEN SATURATION: 98 % | WEIGHT: 200 LBS

## 2023-11-09 DIAGNOSIS — N40.0 BENIGN PROSTATIC HYPERPLASIA, UNSPECIFIED WHETHER LOWER URINARY TRACT SYMPTOMS PRESENT: Primary | ICD-10-CM

## 2023-11-09 LAB
BILIRUBIN, URINE, POC: NEGATIVE
BLOOD URINE, POC: NORMAL
GLUCOSE URINE, POC: NEGATIVE
KETONES, URINE, POC: NEGATIVE
LEUKOCYTE ESTERASE, URINE, POC: NEGATIVE
NITRITE, URINE, POC: NEGATIVE
PH, URINE, POC: 5.5 (ref 4.6–8)
PROTEIN,URINE, POC: NEGATIVE
SPECIFIC GRAVITY, URINE, POC: 1.03 (ref 1–1.03)
URINALYSIS CLARITY, POC: NORMAL
URINALYSIS COLOR, POC: NORMAL
UROBILINOGEN, POC: NORMAL

## 2023-11-09 PROCEDURE — G8427 DOCREV CUR MEDS BY ELIG CLIN: HCPCS | Performed by: UROLOGY

## 2023-11-09 PROCEDURE — 1036F TOBACCO NON-USER: CPT | Performed by: UROLOGY

## 2023-11-09 PROCEDURE — G8484 FLU IMMUNIZE NO ADMIN: HCPCS | Performed by: UROLOGY

## 2023-11-09 PROCEDURE — 3017F COLORECTAL CA SCREEN DOC REV: CPT | Performed by: UROLOGY

## 2023-11-09 PROCEDURE — G8419 CALC BMI OUT NRM PARAM NOF/U: HCPCS | Performed by: UROLOGY

## 2023-11-09 PROCEDURE — 3080F DIAST BP >= 90 MM HG: CPT | Performed by: UROLOGY

## 2023-11-09 PROCEDURE — 1123F ACP DISCUSS/DSCN MKR DOCD: CPT | Performed by: UROLOGY

## 2023-11-09 PROCEDURE — 99204 OFFICE O/P NEW MOD 45 MIN: CPT | Performed by: UROLOGY

## 2023-11-09 PROCEDURE — 3077F SYST BP >= 140 MM HG: CPT | Performed by: UROLOGY

## 2023-11-09 PROCEDURE — 81003 URINALYSIS AUTO W/O SCOPE: CPT | Performed by: UROLOGY

## 2023-11-09 ASSESSMENT — ENCOUNTER SYMPTOMS
EYE PAIN: 0
VOMITING: 0
CONSTIPATION: 1
BACK PAIN: 1
SKIN LESIONS: 1

## 2023-11-09 NOTE — PROGRESS NOTES
anicteric  Abdomen - soft, nontender, nondistended, no masses or organomegaly  Neurological -  normal speech, no focal findings or movement disorder noted  Skin - normal coloration and turgor      Urinalysis  UA - Dipstick  Results for orders placed or performed in visit on 11/09/23   AMB POC URINALYSIS DIP STICK AUTO W/O MICRO   Result Value Ref Range    Color, Urine, POC      Clarity, Urine, POC      Glucose, Urine, POC Negative Negative    Bilirubin, Urine, POC Negative Negative    Ketones, Urine, POC Negative Negative    Specific Gravity, Urine, POC 1.030 1.001 - 1.035    Blood, Urine, POC Trace-intact Negative    pH, Urine, POC 5.5 4.6 - 8.0    Protein, Urine, POC Negative Negative    Urobilinogen, POC 0.2 mg/dL     Nitrite, Urine, POC Negative Negative    Leukocyte Esterase, Urine, POC Negative Negative         UA - Micro  WBC - 0  RBC - 0  Bacteria - 0  Epith - 0    Assessment/Plan    ICD-10-CM    1. Benign prostatic hyperplasia, unspecified whether lower urinary tract symptoms present  N40.0 AMB POC URINALYSIS DIP STICK AUTO W/O MICRO     AMB POC PVR, KENYON,POST-VOID RES,US,NON-IMAGING        All medical and surgical tx options for BPH reviewed. He elected to cont Flomax. I reviewed option for cysto. He elected to call if wishes to proceed. RAJWINDER AJ,     Total time for today's encounter including chart review, result review, documentation and face to face encounter was 46 minutes.

## 2023-11-12 DIAGNOSIS — J34.89 NASAL DRAINAGE: ICD-10-CM

## 2023-11-13 RX ORDER — RISPERIDONE 1 MG/1
1 TABLET ORAL NIGHTLY
Qty: 30 TABLET | Refills: 0 | Status: SHIPPED | OUTPATIENT
Start: 2023-11-13

## 2023-11-13 RX ORDER — TAMSULOSIN HYDROCHLORIDE 0.4 MG/1
0.4 CAPSULE ORAL DAILY
Qty: 30 CAPSULE | Refills: 0 | Status: SHIPPED | OUTPATIENT
Start: 2023-11-13

## 2023-11-13 RX ORDER — DONEPEZIL HYDROCHLORIDE 10 MG/1
10 TABLET, FILM COATED ORAL NIGHTLY
Qty: 30 TABLET | Refills: 0 | Status: SHIPPED | OUTPATIENT
Start: 2023-11-13

## 2023-11-13 RX ORDER — IPRATROPIUM BROMIDE 21 UG/1
2 SPRAY, METERED NASAL EVERY 12 HOURS
Qty: 30 ML | Refills: 5 | OUTPATIENT
Start: 2023-11-13

## 2023-11-13 RX ORDER — DIVALPROEX SODIUM 125 MG/1
CAPSULE, COATED PELLETS ORAL
Qty: 300 CAPSULE | Refills: 0 | Status: SHIPPED | OUTPATIENT
Start: 2023-11-13 | End: 2023-12-13

## 2023-11-13 RX ORDER — RISPERIDONE 0.5 MG/1
0.5 TABLET ORAL EVERY MORNING
Qty: 30 TABLET | Refills: 0 | Status: SHIPPED | OUTPATIENT
Start: 2023-11-13

## 2023-11-13 NOTE — TELEPHONE ENCOUNTER
Refills have been requested for the following medications:        1.  divalproex (DEPAKOTE SPRINKLE) 125 MG DR capsule       2.   donepezil (ARICEPT) 10 MG tablet         3. risperiDONE (RISPERDAL) 0.5 MG tablet       4.   risperiDONE (RISPERDAL) 1 MG tablet         5.  tamsulosin (FLOMAX) 0.4 MG capsule      Preferred pharmacy: [address] [place] [address] [phone]

## 2023-11-21 ENCOUNTER — CLINICAL DOCUMENTATION (OUTPATIENT)
Dept: NEUROLOGY | Age: 68
End: 2023-11-21

## 2023-11-21 ENCOUNTER — TELEPHONE (OUTPATIENT)
Dept: NEUROLOGY | Age: 68
End: 2023-11-21

## 2023-11-21 NOTE — PROGRESS NOTES
The patient apparently for the last 3 to 4 days has been a little bit worse with his behavior. We increase Risperdal up to 1 mg twice a day. I was under the impression this gentleman was on 0.5 twice a day but he is not he has been point 5 in the morning and 1 mg at nights or just can increase it to 1 mg twice a day and see how he does if he is oversedated we can lower. He was at a urologist about 5 days ago and they checked his urine which was normal but that does not mean he does not have an underlying infection so I did tell the family they should still consider taking him to the urgent care to be checked out.

## 2023-11-21 NOTE — TELEPHONE ENCOUNTER
Pt's caregiver called and stated that she has some concern because pt has had a big change in his behavior and is extremely agitated--she is wondering if medication could be adjusted or if there is anything that may help with this

## 2023-11-27 ENCOUNTER — OFFICE VISIT (OUTPATIENT)
Dept: FAMILY MEDICINE CLINIC | Facility: CLINIC | Age: 68
End: 2023-11-27
Payer: MEDICARE

## 2023-11-27 VITALS
TEMPERATURE: 97.5 F | SYSTOLIC BLOOD PRESSURE: 129 MMHG | OXYGEN SATURATION: 95 % | RESPIRATION RATE: 16 BRPM | HEART RATE: 80 BPM | DIASTOLIC BLOOD PRESSURE: 83 MMHG | WEIGHT: 213 LBS | HEIGHT: 75 IN | BODY MASS INDEX: 26.49 KG/M2

## 2023-11-27 DIAGNOSIS — R30.0 DYSURIA: Primary | ICD-10-CM

## 2023-11-27 LAB
APPEARANCE FLUID: CLEAR
BILIRUBIN, POC: NEGATIVE
BLOOD URINE, POC: NEGATIVE
CLARITY, POC: CLEAR
COLOR, POC: NORMAL
GLUCOSE URINE, POC: NEGATIVE
KETONES, POC: NEGATIVE
LEUKOCYTE EST, POC: NEGATIVE
NITRITE, POC: NEGATIVE
PH, POC: 5.5
PROTEIN, POC: NEGATIVE
SPECIFIC GRAVITY, POC: 1.03
UROBILINOGEN, POC: 1

## 2023-11-27 PROCEDURE — 1036F TOBACCO NON-USER: CPT | Performed by: FAMILY MEDICINE

## 2023-11-27 PROCEDURE — G8419 CALC BMI OUT NRM PARAM NOF/U: HCPCS | Performed by: FAMILY MEDICINE

## 2023-11-27 PROCEDURE — 1123F ACP DISCUSS/DSCN MKR DOCD: CPT | Performed by: FAMILY MEDICINE

## 2023-11-27 PROCEDURE — 81002 URINALYSIS NONAUTO W/O SCOPE: CPT | Performed by: FAMILY MEDICINE

## 2023-11-27 PROCEDURE — G8484 FLU IMMUNIZE NO ADMIN: HCPCS | Performed by: FAMILY MEDICINE

## 2023-11-27 PROCEDURE — 3017F COLORECTAL CA SCREEN DOC REV: CPT | Performed by: FAMILY MEDICINE

## 2023-11-27 PROCEDURE — 3079F DIAST BP 80-89 MM HG: CPT | Performed by: FAMILY MEDICINE

## 2023-11-27 PROCEDURE — 99213 OFFICE O/P EST LOW 20 MIN: CPT | Performed by: FAMILY MEDICINE

## 2023-11-27 PROCEDURE — 3074F SYST BP LT 130 MM HG: CPT | Performed by: FAMILY MEDICINE

## 2023-11-27 PROCEDURE — G8427 DOCREV CUR MEDS BY ELIG CLIN: HCPCS | Performed by: FAMILY MEDICINE

## 2023-11-27 RX ORDER — ASPIRIN 81 MG
TABLET,CHEWABLE ORAL
COMMUNITY
Start: 2023-09-20

## 2023-11-27 ASSESSMENT — PATIENT HEALTH QUESTIONNAIRE - PHQ9
SUM OF ALL RESPONSES TO PHQ9 QUESTIONS 1 & 2: 0
SUM OF ALL RESPONSES TO PHQ QUESTIONS 1-9: 0
1. LITTLE INTEREST OR PLEASURE IN DOING THINGS: 0
2. FEELING DOWN, DEPRESSED OR HOPELESS: 0

## 2023-12-19 PROBLEM — E11.9 TYPE 2 DIABETES MELLITUS (HCC): Status: ACTIVE | Noted: 2023-12-19

## 2023-12-19 PROBLEM — F33.0 MAJOR DEPRESSIVE DISORDER, RECURRENT, MILD (HCC): Status: ACTIVE | Noted: 2023-12-19

## 2023-12-19 PROBLEM — F33.1 MAJOR DEPRESSIVE DISORDER, RECURRENT, MODERATE (HCC): Status: ACTIVE | Noted: 2023-12-19

## 2023-12-19 PROBLEM — R56.9 UNSPECIFIED CONVULSIONS (HCC): Status: ACTIVE | Noted: 2023-12-19

## 2023-12-19 PROBLEM — F33.9 MAJOR DEPRESSIVE DISORDER, RECURRENT, UNSPECIFIED (HCC): Status: ACTIVE | Noted: 2023-12-19

## 2023-12-19 PROBLEM — G40.89 OTHER SEIZURES (HCC): Status: ACTIVE | Noted: 2023-12-19

## 2023-12-26 DIAGNOSIS — Z82.49 FAMILY HISTORY OF CORONARY ARTERY BYPASS SURGERY: ICD-10-CM

## 2023-12-26 DIAGNOSIS — E78.5 DYSLIPIDEMIA: ICD-10-CM

## 2023-12-26 DIAGNOSIS — I10 PRIMARY HYPERTENSION: ICD-10-CM

## 2023-12-26 RX ORDER — RISPERIDONE 1 MG/1
1 TABLET ORAL NIGHTLY
Qty: 30 TABLET | Refills: 5 | Status: SHIPPED | OUTPATIENT
Start: 2023-12-26

## 2023-12-26 RX ORDER — TAMSULOSIN HYDROCHLORIDE 0.4 MG/1
0.4 CAPSULE ORAL DAILY
Qty: 30 CAPSULE | Refills: 0 | Status: CANCELLED | OUTPATIENT
Start: 2023-12-26

## 2023-12-26 RX ORDER — DONEPEZIL HYDROCHLORIDE 10 MG/1
10 TABLET, FILM COATED ORAL NIGHTLY
Qty: 30 TABLET | Refills: 0 | Status: SHIPPED | OUTPATIENT
Start: 2023-12-26 | End: 2023-12-26 | Stop reason: SDUPTHER

## 2023-12-26 RX ORDER — SIMVASTATIN 10 MG
10 TABLET ORAL NIGHTLY
Qty: 90 TABLET | Refills: 1 | Status: SHIPPED | OUTPATIENT
Start: 2023-12-26

## 2023-12-26 RX ORDER — SIMVASTATIN 10 MG
10 TABLET ORAL NIGHTLY
Qty: 90 TABLET | Refills: 1 | Status: SHIPPED | OUTPATIENT
Start: 2023-12-26 | End: 2023-12-26 | Stop reason: SDUPTHER

## 2023-12-26 RX ORDER — RISPERIDONE 1 MG/1
1 TABLET ORAL NIGHTLY
Qty: 30 TABLET | Refills: 0 | Status: CANCELLED | OUTPATIENT
Start: 2023-12-26

## 2023-12-26 RX ORDER — DONEPEZIL HYDROCHLORIDE 10 MG/1
10 TABLET, FILM COATED ORAL NIGHTLY
Qty: 90 TABLET | Refills: 3 | Status: SHIPPED | OUTPATIENT
Start: 2023-12-26

## 2023-12-26 RX ORDER — DONEPEZIL HYDROCHLORIDE 10 MG/1
10 TABLET, FILM COATED ORAL NIGHTLY
Qty: 30 TABLET | Refills: 0 | Status: CANCELLED | OUTPATIENT
Start: 2023-12-26

## 2023-12-26 RX ORDER — RISPERIDONE 0.5 MG/1
0.5 TABLET ORAL EVERY MORNING
Qty: 30 TABLET | Refills: 5 | Status: SHIPPED | OUTPATIENT
Start: 2023-12-26

## 2023-12-26 RX ORDER — RISPERIDONE 1 MG/1
1 TABLET ORAL NIGHTLY
Qty: 30 TABLET | Refills: 0 | Status: SHIPPED | OUTPATIENT
Start: 2023-12-26 | End: 2023-12-26 | Stop reason: SDUPTHER

## 2023-12-26 RX ORDER — MEMANTINE HYDROCHLORIDE 10 MG/1
10 TABLET ORAL 2 TIMES DAILY
Qty: 180 TABLET | Refills: 3 | Status: SHIPPED | OUTPATIENT
Start: 2023-12-26

## 2023-12-26 RX ORDER — OLMESARTAN MEDOXOMIL 20 MG/1
20 TABLET ORAL DAILY
Qty: 90 TABLET | Refills: 1 | Status: SHIPPED | OUTPATIENT
Start: 2023-12-26

## 2023-12-26 RX ORDER — TAMSULOSIN HYDROCHLORIDE 0.4 MG/1
0.4 CAPSULE ORAL DAILY
Qty: 30 CAPSULE | Refills: 0 | Status: SHIPPED | OUTPATIENT
Start: 2023-12-26

## 2023-12-26 RX ORDER — RISPERIDONE 0.5 MG/1
0.5 TABLET ORAL EVERY MORNING
Qty: 30 TABLET | Refills: 0 | Status: CANCELLED | OUTPATIENT
Start: 2023-12-26

## 2023-12-26 RX ORDER — RISPERIDONE 0.5 MG/1
0.5 TABLET ORAL EVERY MORNING
Qty: 30 TABLET | Refills: 0 | Status: SHIPPED | OUTPATIENT
Start: 2023-12-26 | End: 2023-12-26 | Stop reason: SDUPTHER

## 2023-12-26 NOTE — TELEPHONE ENCOUNTER
Pt requests refill on the following medications, states prescribing doctor out of office, unable to fill:  donepezil (ARICEPT) 10 MG tablet     risperiDONE (RISPERDAL) 0.5 MG tablet     risperiDONE (RISPERDAL) 1 MG tablet      tamsulosin (FLOMAX) 0.4 MG capsule      Pharmacy: [id number] [address] [place] [address] [address], [address]  [address] [place] [address] [phone] Tiffany Perera     LOV: 11/27/2023    Next OV: None at time of encounter

## 2023-12-28 RX ORDER — DIVALPROEX SODIUM 125 MG/1
CAPSULE, COATED PELLETS ORAL
Qty: 240 CAPSULE | Refills: 5 | Status: SHIPPED | OUTPATIENT
Start: 2023-12-28

## 2023-12-28 NOTE — TELEPHONE ENCOUNTER
A refill has been requested for:  divalproex (DEPAKOTE SPRINKLE) 125 MG DR capsule    Sig: TAKE TWO CAPSULES BY MOUTH EVERY MORNING & 4 BY MOUTH WITH LUNCH AND 4 CAPSULES BY MOUTH WITH SUPPER    [id number] - [address] [place] - P [phone] - F [phone]

## 2024-01-05 ENCOUNTER — OFFICE VISIT (OUTPATIENT)
Dept: NEUROLOGY | Age: 69
End: 2024-01-05
Payer: MEDICARE

## 2024-01-05 ENCOUNTER — HOSPITAL ENCOUNTER (OUTPATIENT)
Dept: NUTRITION | Age: 69
Setting detail: RECURRING SERIES
Discharge: HOME | End: 2024-01-08

## 2024-01-05 VITALS — BODY MASS INDEX: 26.11 KG/M2 | WEIGHT: 210 LBS | HEIGHT: 75 IN

## 2024-01-05 DIAGNOSIS — F03.C3 SEVERE DEMENTIA WITH MOOD DISTURBANCE, UNSPECIFIED DEMENTIA TYPE (HCC): ICD-10-CM

## 2024-01-05 DIAGNOSIS — F03.C3 SEVERE DEMENTIA WITH MOOD DISTURBANCE, UNSPECIFIED DEMENTIA TYPE (HCC): Primary | ICD-10-CM

## 2024-01-05 PROCEDURE — 97802 MEDICAL NUTRITION INDIV IN: CPT

## 2024-01-05 PROCEDURE — G8417 CALC BMI ABV UP PARAM F/U: HCPCS | Performed by: PSYCHIATRY & NEUROLOGY

## 2024-01-05 PROCEDURE — 1036F TOBACCO NON-USER: CPT | Performed by: PSYCHIATRY & NEUROLOGY

## 2024-01-05 PROCEDURE — 99214 OFFICE O/P EST MOD 30 MIN: CPT | Performed by: PSYCHIATRY & NEUROLOGY

## 2024-01-05 PROCEDURE — 3017F COLORECTAL CA SCREEN DOC REV: CPT | Performed by: PSYCHIATRY & NEUROLOGY

## 2024-01-05 PROCEDURE — G8484 FLU IMMUNIZE NO ADMIN: HCPCS | Performed by: PSYCHIATRY & NEUROLOGY

## 2024-01-05 PROCEDURE — 1123F ACP DISCUSS/DSCN MKR DOCD: CPT | Performed by: PSYCHIATRY & NEUROLOGY

## 2024-01-05 PROCEDURE — G8427 DOCREV CUR MEDS BY ELIG CLIN: HCPCS | Performed by: PSYCHIATRY & NEUROLOGY

## 2024-01-05 RX ORDER — RISPERIDONE 1 MG/1
1 TABLET ORAL 2 TIMES DAILY
Qty: 60 TABLET | Refills: 5 | Status: SHIPPED | OUTPATIENT
Start: 2024-01-05

## 2024-01-05 RX ORDER — MEMANTINE HYDROCHLORIDE 10 MG/1
10 TABLET ORAL 2 TIMES DAILY
Qty: 180 TABLET | Refills: 3 | Status: SHIPPED | OUTPATIENT
Start: 2024-01-05

## 2024-01-05 RX ORDER — DONEPEZIL HYDROCHLORIDE 10 MG/1
10 TABLET, FILM COATED ORAL NIGHTLY
Qty: 90 TABLET | Refills: 3 | Status: SHIPPED | OUTPATIENT
Start: 2024-01-05

## 2024-01-05 RX ORDER — DIVALPROEX SODIUM 125 MG/1
CAPSULE, COATED PELLETS ORAL
Qty: 240 CAPSULE | Refills: 5 | Status: SHIPPED | OUTPATIENT
Start: 2024-01-05

## 2024-01-05 ASSESSMENT — ENCOUNTER SYMPTOMS
ALLERGIC/IMMUNOLOGIC NEGATIVE: 1
RESPIRATORY NEGATIVE: 1
EYES NEGATIVE: 1
GASTROINTESTINAL NEGATIVE: 1

## 2024-01-05 NOTE — PROGRESS NOTES
[facility] NEUROLOGY  [address], [address]  [place] [address]  Phone: [phone] Fax [phone]  Dr. Douglas Flores      1/5/2024  Rickjana Cortez III     Patient is referred by the following provider for consultation regarding as below:       I reviewed the available records and notes and have examined patient with the following findings:     Chief Complaint:  Chief Complaint   Patient presents with    Follow-up    Memory Loss          HPI: This is a right handed 68 y.o. Single male here with 2 of his 3 sisters and he lives with 2 sisters 1 for the first part of the week and another 1 for the second part of the week and it does not seem to affect him in a negative way.  Last time I saw him he was still delusional posthospital but now he is cleared up a little bit.  He still has advanced dementia and still has behavioral issues.  His medicines include Depakote 125 DR 2 in the morning 3 midday 3 at night Aricept 10 mg once a day Namenda 10 twice a day Risperdal 1 mg tablets half in the morning 1 at night Zoloft and Flomax and a few others.  He is sleeping quite a bit throughout the day so they want to lower some of his medications.    This gentleman does have short-term memory loss its progressively getting getting worse over the years with a partial expressive component to his speech.  He uses a stutter to slow down his speech she repeats questions rapidly.  No longer is driving.  His bills are autografted.  And his sisters, take care of him and medications and food.  Needs encouragement for showering.  September 2023 he had a psychotic break/delirium was admitted to the hospital they tried high-dose Zyprexa high-dose Seroquel and Risperdal and Depakote they ended up with just Depakote and Risperdal.  And he had an accelerated memory loss from that point.  They did antibiotics as well at that time.  Last time I met with his gentleman his MoCA test was less than 5 out of 30.  Currently is

## 2024-01-08 ENCOUNTER — TELEPHONE (OUTPATIENT)
Dept: FAMILY MEDICINE CLINIC | Facility: CLINIC | Age: 69
End: 2024-01-08

## 2024-01-09 DIAGNOSIS — F01.A0 MILD VASCULAR DEMENTIA WITHOUT BEHAVIORAL DISTURBANCE, PSYCHOTIC DISTURBANCE, MOOD DISTURBANCE, OR ANXIETY (HCC): Primary | ICD-10-CM

## 2024-01-09 LAB

## 2024-01-13 LAB
ARSENIC BLD-MCNC: 1 UG/L (ref 0–9)
LEAD BLD-MCNC: <1 UG/DL (ref 0–3.4)
MERCURY BLD-MCNC: <1 UG/L (ref 0–14.9)
RACE: NORMAL

## 2024-01-16 RX ORDER — TAMSULOSIN HYDROCHLORIDE 0.4 MG/1
0.4 CAPSULE ORAL DAILY
Qty: 30 CAPSULE | Refills: 0 | OUTPATIENT
Start: 2024-01-16

## 2024-01-17 RX ORDER — TAMSULOSIN HYDROCHLORIDE 0.4 MG/1
0.4 CAPSULE ORAL DAILY
Qty: 30 CAPSULE | Refills: 0 | Status: SHIPPED | OUTPATIENT
Start: 2024-01-17

## 2024-01-19 ENCOUNTER — OFFICE VISIT (OUTPATIENT)
Dept: NEUROLOGY | Age: 69
End: 2024-01-19

## 2024-01-19 DIAGNOSIS — F03.C3 SEVERE DEMENTIA WITH MOOD DISTURBANCE, UNSPECIFIED DEMENTIA TYPE (HCC): Primary | ICD-10-CM

## 2024-01-19 NOTE — PROGRESS NOTES
[facility] NEUROLOGY  [address] [place] [address], [address]  [place] [address]  Phone: [phone] Fax [phone]  Dr. Douglas Flores      Date of service:  01/19/2024      EEG Number:     Indication: Seizure    Meds:   Current Outpatient Medications   Medication Sig Dispense Refill    tamsulosin (FLOMAX) 0.4 MG capsule Take 1 capsule by mouth daily 30 capsule 0    divalproex (DEPAKOTE SPRINKLE) 125 MG DR capsule Take 2 in AM and 3 at noon and 3 at hs 240 capsule 5    memantine (NAMENDA) 10 MG tablet Take 1 tablet by mouth 2 times daily 180 tablet 3    risperiDONE (RISPERDAL) 1 MG tablet Take 1 tablet by mouth 2 times daily 60 tablet 5    donepezil (ARICEPT) 10 MG tablet Take 1 tablet by mouth nightly 90 tablet 3    olmesartan (BENICAR) 20 MG tablet Take 1 tablet by mouth daily 90 tablet 1    risperiDONE (RISPERDAL) 0.5 MG tablet Take 1 tablet by mouth every morning 30 tablet 5    simvastatin (ZOCOR) 10 MG tablet Take 1 tablet by mouth nightly TAKE ONE TABLET BY MOUTH AT BEDTIME 90 tablet 1    ASPIRIN LOW DOSE 81 MG chewable tablet       ipratropium (ATROVENT) 0.03 % nasal spray 2 sprays by Each Nostril route in the morning and 2 sprays in the evening. 30 mL 5    sertraline (ZOLOFT) 100 MG tablet Take 1.5 q day 135 tablet 3     No current facility-administered medications for this visit.       Technique: This EEG was performed using the [employer] 10/20 System.  An EKG was monitored. The length of the recording was 30 minutes.    State of Consciousness: awake       Description:21 channel eeg  Background:  Normal.   Rhythms:  Normal.   Epileptiform:  None.   Symmetry :  Normal.     Alpha:  8 hz = normal amount.  Normal attenuation with eye opening.     Beta:   13 + Hz and good amplitudes :  Normal amount.  Normal symmetry.    Theta:   5-7 Hz:  Normal amounts.     Delta:   2 - 3 hz:   Normal amounts.    No bizarre or unusual rhythms.                   EKG::   sinus       Activation

## 2024-01-30 ENCOUNTER — TELEPHONE (OUTPATIENT)
Dept: FAMILY MEDICINE CLINIC | Facility: CLINIC | Age: 69
End: 2024-01-30

## 2024-01-30 ENCOUNTER — APPOINTMENT (OUTPATIENT)
Dept: GENERAL RADIOLOGY | Age: 69
DRG: 177 | End: 2024-01-30
Payer: MEDICARE

## 2024-01-30 ENCOUNTER — HOSPITAL ENCOUNTER (INPATIENT)
Age: 69
LOS: 2 days | Discharge: HOME | DRG: 177 | End: 2024-02-01
Attending: EMERGENCY MEDICINE | Admitting: STUDENT IN AN ORGANIZED HEALTH CARE EDUCATION/TRAINING PROGRAM
Payer: MEDICARE

## 2024-01-30 ENCOUNTER — NURSE ONLY (OUTPATIENT)
Dept: FAMILY MEDICINE CLINIC | Facility: CLINIC | Age: 69
End: 2024-01-30
Payer: MEDICARE

## 2024-01-30 DIAGNOSIS — U07.1 COVID-19: Primary | ICD-10-CM

## 2024-01-30 DIAGNOSIS — F01.C11 SEVERE VASCULAR DEMENTIA WITH AGITATION (HCC): Chronic | ICD-10-CM

## 2024-01-30 DIAGNOSIS — R09.02 HYPOXEMIA: ICD-10-CM

## 2024-01-30 DIAGNOSIS — R50.9 FEVER, UNSPECIFIED FEVER CAUSE: Primary | ICD-10-CM

## 2024-01-30 DIAGNOSIS — R41.82 ALTERED MENTAL STATUS, UNSPECIFIED ALTERED MENTAL STATUS TYPE: ICD-10-CM

## 2024-01-30 DIAGNOSIS — R30.0 DYSURIA: Primary | ICD-10-CM

## 2024-01-30 PROBLEM — J96.01 ACUTE HYPOXIC RESPIRATORY FAILURE (HCC): Status: ACTIVE | Noted: 2024-01-30

## 2024-01-30 PROBLEM — F39 MOOD DISORDER (HCC): Status: ACTIVE | Noted: 2024-01-30

## 2024-01-30 PROBLEM — G93.41 ACUTE METABOLIC ENCEPHALOPATHY: Status: ACTIVE | Noted: 2024-01-30

## 2024-01-30 PROBLEM — J96.01 ACUTE RESPIRATORY FAILURE WITH HYPOXIA (HCC): Status: ACTIVE | Noted: 2024-01-30

## 2024-01-30 LAB
ALBUMIN SERPL-MCNC: 3.3 G/DL (ref 3.2–4.6)
ALBUMIN/GLOB SERPL: 0.9 (ref 0.4–1.6)
ALP SERPL-CCNC: 59 U/L (ref 50–136)
ALT SERPL-CCNC: 21 U/L (ref 12–65)
ANION GAP SERPL CALC-SCNC: 2 MMOL/L (ref 2–11)
APPEARANCE FLUID: ABNORMAL
APPEARANCE UR: NORMAL
AST SERPL-CCNC: 16 U/L (ref 15–37)
BASOPHILS # BLD: 0 K/UL (ref 0–0.2)
BASOPHILS NFR BLD: 1 % (ref 0–2)
BILIRUB SERPL-MCNC: 0.5 MG/DL (ref 0.2–1.1)
BILIRUB UR QL: NEGATIVE
BILIRUBIN, POC: NEGATIVE
BLOOD URINE, POC: NEGATIVE
BUN SERPL-MCNC: 15 MG/DL (ref 8–23)
CALCIUM SERPL-MCNC: 9.8 MG/DL (ref 8.3–10.4)
CHLORIDE SERPL-SCNC: 104 MMOL/L (ref 103–113)
CLARITY, POC: ABNORMAL
CO2 SERPL-SCNC: 30 MMOL/L (ref 21–32)
COLOR UR: NORMAL
COLOR, POC: YELLOW
CREAT SERPL-MCNC: 1.28 MG/DL (ref 0.8–1.5)
CRP SERPL-MCNC: 2.2 MG/DL (ref 0–0.9)
DIFFERENTIAL METHOD BLD: ABNORMAL
EKG ATRIAL RATE: 69 BPM
EKG DIAGNOSIS: NORMAL
EKG P AXIS: 50 DEGREES
EKG P-R INTERVAL: 54 MS
EKG Q-T INTERVAL: 385 MS
EKG QRS DURATION: 104 MS
EKG QTC CALCULATION (BAZETT): 407 MS
EKG R AXIS: 40 DEGREES
EKG T AXIS: 39 DEGREES
EKG VENTRICULAR RATE: 67 BPM
EOSINOPHIL # BLD: 0.1 K/UL (ref 0–0.8)
EOSINOPHIL NFR BLD: 2 % (ref 0.5–7.8)
ERYTHROCYTE [DISTWIDTH] IN BLOOD BY AUTOMATED COUNT: 13.2 % (ref 11.9–14.6)
FLUAV RNA SPEC QL NAA+PROBE: NOT DETECTED
FLUBV RNA SPEC QL NAA+PROBE: NOT DETECTED
GLOBULIN SER CALC-MCNC: 3.5 G/DL (ref 2.8–4.5)
GLUCOSE SERPL-MCNC: 106 MG/DL (ref 65–100)
GLUCOSE UR STRIP.AUTO-MCNC: NEGATIVE MG/DL
GLUCOSE URINE, POC: NEGATIVE
HCT VFR BLD AUTO: 35.9 % (ref 41.1–50.3)
HGB BLD-MCNC: 11.7 G/DL (ref 13.6–17.2)
HGB UR QL STRIP: NEGATIVE
IMM GRANULOCYTES # BLD AUTO: 0 K/UL (ref 0–0.5)
IMM GRANULOCYTES NFR BLD AUTO: 1 % (ref 0–5)
KETONES UR QL STRIP.AUTO: NEGATIVE MG/DL
KETONES, POC: ABNORMAL
LACTATE SERPL-SCNC: 1.2 MMOL/L (ref 0.4–2)
LEUKOCYTE EST, POC: NEGATIVE
LEUKOCYTE ESTERASE UR QL STRIP.AUTO: NEGATIVE
LYMPHOCYTES # BLD: 0.4 K/UL (ref 0.5–4.6)
LYMPHOCYTES NFR BLD: 8 % (ref 13–44)
MCH RBC QN AUTO: 29 PG (ref 26.1–32.9)
MCHC RBC AUTO-ENTMCNC: 32.6 G/DL (ref 31.4–35)
MCV RBC AUTO: 88.9 FL (ref 82–102)
MONOCYTES # BLD: 0.3 K/UL (ref 0.1–1.3)
MONOCYTES NFR BLD: 7 % (ref 4–12)
NEUTS SEG # BLD: 3.9 K/UL (ref 1.7–8.2)
NEUTS SEG NFR BLD: 83 % (ref 43–78)
NITRITE UR QL STRIP.AUTO: NEGATIVE
NITRITE, POC: NEGATIVE
NRBC # BLD: 0 K/UL (ref 0–0.2)
PH UR STRIP: 7.5 (ref 5–9)
PH, POC: 7.5
PLATELET # BLD AUTO: 125 K/UL (ref 150–450)
PMV BLD AUTO: 8.9 FL (ref 9.4–12.3)
POTASSIUM SERPL-SCNC: 4 MMOL/L (ref 3.5–5.1)
PROCALCITONIN SERPL-MCNC: <0.05 NG/ML (ref 0–0.49)
PROT SERPL-MCNC: 6.8 G/DL (ref 6.3–8.2)
PROT UR STRIP-MCNC: NEGATIVE MG/DL
PROTEIN, POC: NEGATIVE
RBC # BLD AUTO: 4.04 M/UL (ref 4.23–5.6)
SARS-COV-2 RDRP RESP QL NAA+PROBE: DETECTED
SODIUM SERPL-SCNC: 136 MMOL/L (ref 136–146)
SOURCE: ABNORMAL
SP GR UR REFRACTOMETRY: 1.02 (ref 1–1.02)
SPECIFIC GRAVITY, POC: 1.02
UROBILINOGEN UR QL STRIP.AUTO: 1 EU/DL (ref 0.2–1)
UROBILINOGEN, POC: 1
WBC # BLD AUTO: 4.7 K/UL (ref 4.3–11.1)

## 2024-01-30 PROCEDURE — 6360000002 HC RX W HCPCS: Performed by: STUDENT IN AN ORGANIZED HEALTH CARE EDUCATION/TRAINING PROGRAM

## 2024-01-30 PROCEDURE — 83605 ASSAY OF LACTIC ACID: CPT

## 2024-01-30 PROCEDURE — 87040 BLOOD CULTURE FOR BACTERIA: CPT

## 2024-01-30 PROCEDURE — 6370000000 HC RX 637 (ALT 250 FOR IP): Performed by: STUDENT IN AN ORGANIZED HEALTH CARE EDUCATION/TRAINING PROGRAM

## 2024-01-30 PROCEDURE — 87502 INFLUENZA DNA AMP PROBE: CPT

## 2024-01-30 PROCEDURE — 87635 SARS-COV-2 COVID-19 AMP PRB: CPT

## 2024-01-30 PROCEDURE — 93010 ELECTROCARDIOGRAM REPORT: CPT | Performed by: INTERNAL MEDICINE

## 2024-01-30 PROCEDURE — 81002 URINALYSIS NONAUTO W/O SCOPE: CPT | Performed by: FAMILY MEDICINE

## 2024-01-30 PROCEDURE — 85025 COMPLETE CBC W/AUTO DIFF WBC: CPT

## 2024-01-30 PROCEDURE — 71045 X-RAY EXAM CHEST 1 VIEW: CPT

## 2024-01-30 PROCEDURE — 2700000000 HC OXYGEN THERAPY PER DAY

## 2024-01-30 PROCEDURE — 1100000000 HC RM PRIVATE

## 2024-01-30 PROCEDURE — 86140 C-REACTIVE PROTEIN: CPT

## 2024-01-30 PROCEDURE — 94761 N-INVAS EAR/PLS OXIMETRY MLT: CPT

## 2024-01-30 PROCEDURE — 80053 COMPREHEN METABOLIC PANEL: CPT

## 2024-01-30 PROCEDURE — 93005 ELECTROCARDIOGRAM TRACING: CPT | Performed by: EMERGENCY MEDICINE

## 2024-01-30 PROCEDURE — 81003 URINALYSIS AUTO W/O SCOPE: CPT

## 2024-01-30 PROCEDURE — 99285 EMERGENCY DEPT VISIT HI MDM: CPT

## 2024-01-30 PROCEDURE — 84145 PROCALCITONIN (PCT): CPT

## 2024-01-30 RX ORDER — ONDANSETRON 2 MG/ML
4 INJECTION INTRAMUSCULAR; INTRAVENOUS EVERY 6 HOURS PRN
Status: DISCONTINUED | OUTPATIENT
Start: 2024-01-30 | End: 2024-02-01 | Stop reason: HOSPADM

## 2024-01-30 RX ORDER — DIVALPROEX SODIUM 125 MG/1
250 CAPSULE, COATED PELLETS ORAL DAILY
Status: DISCONTINUED | OUTPATIENT
Start: 2024-01-31 | End: 2024-02-01 | Stop reason: HOSPADM

## 2024-01-30 RX ORDER — TAMSULOSIN HYDROCHLORIDE 0.4 MG/1
0.4 CAPSULE ORAL DAILY
Status: DISCONTINUED | OUTPATIENT
Start: 2024-01-31 | End: 2024-02-01 | Stop reason: HOSPADM

## 2024-01-30 RX ORDER — SODIUM CHLORIDE 0.9 % (FLUSH) 0.9 %
5-40 SYRINGE (ML) INJECTION EVERY 12 HOURS SCHEDULED
Status: DISCONTINUED | OUTPATIENT
Start: 2024-01-30 | End: 2024-02-01 | Stop reason: HOSPADM

## 2024-01-30 RX ORDER — ASPIRIN 81 MG/1
81 TABLET, CHEWABLE ORAL DAILY
Status: DISCONTINUED | OUTPATIENT
Start: 2024-01-31 | End: 2024-02-01 | Stop reason: HOSPADM

## 2024-01-30 RX ORDER — DIVALPROEX SODIUM 125 MG/1
250 CAPSULE, COATED PELLETS ORAL EVERY 24 HOURS
Status: DISCONTINUED | OUTPATIENT
Start: 2024-01-31 | End: 2024-01-30 | Stop reason: CLARIF

## 2024-01-30 RX ORDER — ACETAMINOPHEN 325 MG/1
650 TABLET ORAL EVERY 6 HOURS PRN
Status: DISCONTINUED | OUTPATIENT
Start: 2024-01-30 | End: 2024-02-01 | Stop reason: HOSPADM

## 2024-01-30 RX ORDER — RISPERIDONE 1 MG/1
1 TABLET ORAL
Status: DISCONTINUED | OUTPATIENT
Start: 2024-01-30 | End: 2024-02-01 | Stop reason: HOSPADM

## 2024-01-30 RX ORDER — ONDANSETRON 4 MG/1
4 TABLET, ORALLY DISINTEGRATING ORAL EVERY 8 HOURS PRN
Status: DISCONTINUED | OUTPATIENT
Start: 2024-01-30 | End: 2024-02-01 | Stop reason: HOSPADM

## 2024-01-30 RX ORDER — DIVALPROEX SODIUM 125 MG/1
375 CAPSULE, COATED PELLETS ORAL ONCE
Status: COMPLETED | OUTPATIENT
Start: 2024-01-30 | End: 2024-01-30

## 2024-01-30 RX ORDER — POLYETHYLENE GLYCOL 3350 17 G/17G
17 POWDER, FOR SOLUTION ORAL DAILY PRN
Status: DISCONTINUED | OUTPATIENT
Start: 2024-01-30 | End: 2024-02-01 | Stop reason: HOSPADM

## 2024-01-30 RX ORDER — SODIUM CHLORIDE 0.9 % (FLUSH) 0.9 %
5-40 SYRINGE (ML) INJECTION PRN
Status: DISCONTINUED | OUTPATIENT
Start: 2024-01-30 | End: 2024-02-01 | Stop reason: HOSPADM

## 2024-01-30 RX ORDER — MEMANTINE HYDROCHLORIDE 5 MG/1
10 TABLET ORAL 2 TIMES DAILY
Status: DISCONTINUED | OUTPATIENT
Start: 2024-01-30 | End: 2024-01-30

## 2024-01-30 RX ORDER — DONEPEZIL HYDROCHLORIDE 5 MG/1
10 TABLET, FILM COATED ORAL NIGHTLY
Status: DISCONTINUED | OUTPATIENT
Start: 2024-01-30 | End: 2024-02-01 | Stop reason: HOSPADM

## 2024-01-30 RX ORDER — ATORVASTATIN CALCIUM 10 MG/1
10 TABLET, FILM COATED ORAL DAILY
Status: DISCONTINUED | OUTPATIENT
Start: 2024-01-31 | End: 2024-02-01 | Stop reason: HOSPADM

## 2024-01-30 RX ORDER — RISPERIDONE 0.5 MG/1
0.5 TABLET ORAL EVERY OTHER DAY
Status: DISCONTINUED | OUTPATIENT
Start: 2024-02-01 | End: 2024-02-01 | Stop reason: HOSPADM

## 2024-01-30 RX ORDER — RISPERIDONE 1 MG/1
0.5 TABLET ORAL
Status: DISCONTINUED | OUTPATIENT
Start: 2024-01-31 | End: 2024-01-30 | Stop reason: CLARIF

## 2024-01-30 RX ORDER — SODIUM CHLORIDE 9 MG/ML
INJECTION, SOLUTION INTRAVENOUS PRN
Status: DISCONTINUED | OUTPATIENT
Start: 2024-01-30 | End: 2024-02-01 | Stop reason: HOSPADM

## 2024-01-30 RX ORDER — DEXAMETHASONE SODIUM PHOSPHATE 10 MG/ML
6 INJECTION INTRAMUSCULAR; INTRAVENOUS EVERY 24 HOURS
Status: DISCONTINUED | OUTPATIENT
Start: 2024-01-30 | End: 2024-01-31

## 2024-01-30 RX ORDER — ACETAMINOPHEN 650 MG/1
650 SUPPOSITORY RECTAL EVERY 6 HOURS PRN
Status: DISCONTINUED | OUTPATIENT
Start: 2024-01-30 | End: 2024-02-01 | Stop reason: HOSPADM

## 2024-01-30 RX ORDER — ENOXAPARIN SODIUM 100 MG/ML
40 INJECTION SUBCUTANEOUS EVERY 24 HOURS
Status: DISCONTINUED | OUTPATIENT
Start: 2024-01-31 | End: 2024-02-01 | Stop reason: HOSPADM

## 2024-01-30 RX ORDER — DIVALPROEX SODIUM 125 MG/1
375 CAPSULE, COATED PELLETS ORAL EVERY 24 HOURS
Status: DISCONTINUED | OUTPATIENT
Start: 2024-01-31 | End: 2024-01-30 | Stop reason: CLARIF

## 2024-01-30 RX ORDER — MEMANTINE HYDROCHLORIDE 5 MG/1
10 TABLET ORAL 2 TIMES DAILY
Status: DISCONTINUED | OUTPATIENT
Start: 2024-01-30 | End: 2024-02-01 | Stop reason: HOSPADM

## 2024-01-30 RX ORDER — IPRATROPIUM BROMIDE AND ALBUTEROL SULFATE 2.5; .5 MG/3ML; MG/3ML
1 SOLUTION RESPIRATORY (INHALATION) EVERY 4 HOURS PRN
Status: DISCONTINUED | OUTPATIENT
Start: 2024-01-30 | End: 2024-02-01 | Stop reason: HOSPADM

## 2024-01-30 RX ORDER — DIVALPROEX SODIUM 125 MG/1
375 CAPSULE, COATED PELLETS ORAL 2 TIMES DAILY
Status: DISCONTINUED | OUTPATIENT
Start: 2024-01-30 | End: 2024-02-01 | Stop reason: HOSPADM

## 2024-01-30 RX ADMIN — MEMANTINE 10 MG: 5 TABLET ORAL at 17:22

## 2024-01-30 RX ADMIN — DONEPEZIL HYDROCHLORIDE 10 MG: 5 TABLET, FILM COATED ORAL at 20:53

## 2024-01-30 RX ADMIN — DIVALPROEX SODIUM 375 MG: 125 CAPSULE, COATED PELLETS ORAL at 20:53

## 2024-01-30 RX ADMIN — DIVALPROEX SODIUM 375 MG: 125 CAPSULE, COATED PELLETS ORAL at 17:22

## 2024-01-30 RX ADMIN — DEXAMETHASONE SODIUM PHOSPHATE 6 MG: 10 INJECTION INTRAMUSCULAR; INTRAVENOUS at 17:02

## 2024-01-30 RX ADMIN — RISPERIDONE 1 MG: 1 TABLET, FILM COATED ORAL at 20:52

## 2024-01-30 ASSESSMENT — ENCOUNTER SYMPTOMS
BACK PAIN: 0
GASTROINTESTINAL NEGATIVE: 1
SHORTNESS OF BREATH: 1
COUGH: 1

## 2024-01-30 ASSESSMENT — PAIN SCALES - GENERAL
PAINLEVEL_OUTOF10: 0
PAINLEVEL_OUTOF10: 0

## 2024-01-30 ASSESSMENT — PAIN - FUNCTIONAL ASSESSMENT: PAIN_FUNCTIONAL_ASSESSMENT: 0-10

## 2024-01-30 NOTE — ED NOTES
TRANSFER - OUT REPORT:    Verbal report given to Pamela ECHAVARRIA on Rick KLINE [place] III  being transferred to 328 for routine progression of patient care       Report consisted of patient's Situation, Background, Assessment and   Recommendations(SBAR).     Information from the following report(s) ED SBAR was reviewed with the receiving nurse.    [place] Fall Assessment:    Presents to emergency department  because of falls (Syncope, seizure, or loss of consciousness): No  Age > 70: No  Altered Mental Status, Intoxication with alcohol or substance confusion (Disorientation, impaired judgment, poor safety awaremess, or inability to follow instructions): Yes  Impaired Mobility: Ambulates or transfers with assistive devices or assistance; Unable to ambulate or transer.: No             Lines:   Peripheral IV 01/30/24 Distal;Left Forearm (Active)   Site Assessment Clean, dry & intact 01/30/24 1239   Line Status Blood return noted;Flushed 01/30/24 1239   Line Care Connections checked and tightened 01/30/24 1239   Phlebitis Assessment No symptoms 01/30/24 1239   Infiltration Assessment 0 01/30/24 1239   Alcohol Cap Used No 01/30/24 1239   Dressing Status Clean, dry & intact 01/30/24 1239   Dressing Type Transparent 01/30/24 1239       Peripheral IV 01/30/24 Right Antecubital (Active)   Site Assessment Clean, dry & intact 01/30/24 1239   Line Status Blood return noted;Specimen collected;Flushed 01/30/24 1239   Line Care Cap changed 01/30/24 1239   Phlebitis Assessment No symptoms 01/30/24 1239   Infiltration Assessment 0 01/30/24 1239   Alcohol Cap Used No 01/30/24 1239   Dressing Status New dressing applied 01/30/24 1239   Dressing Type Transparent 01/30/24 1239   Dressing Intervention New 01/30/24 1239        Opportunity for questions and clarification was provided.      Patient transported with:  O2 @ 2lpm

## 2024-01-30 NOTE — ED PROVIDER NOTES
Emergency Department Provider Note       PCP: Cullen Mims MD   Age: 68 y.o.   Sex: male     DISPOSITION Decision To Admit 01/30/2024 02:24:56 PM       ICD-10-CM    1. COVID-19  U07.1       2. Hypoxemia  R09.02       3. Altered mental status, unspecified altered mental status type  R41.82           Medical Decision Making     Complexity of Problems Addressed:  1 or more acute illnesses that pose a threat to life or bodily function.     Data Reviewed and Analyzed:   I independently ordered and reviewed each unique test.  I reviewed external records: provider visit note from PCP.   The patients assessment required an independent historian: EMS, family.  The reason they were needed is  an altered level of consciousness and important historical information not provided by the patient.    I independently ordered and interpreted the ED EKG in the absence of a Cardiologist.    See below       I interpreted the X-rays chest x-ray without consolidation or infiltrate.  I interpreted the labs.    Discussion of management or test interpretation.  Chest x-ray without consolidation infiltrate.  COVID-positive.  He was hypoxic at 88% on room air.  On 2 L nasal cannula he is at 94%.  Blood pressure is stable.  Will admit patient to the hospitalist service for further management.  Low suspicion for pneumonia at this time.  Low suspicion for PE.  Does not exam fluid overload.  ED Course as of 01/30/24 1428   Tue Jan 30, 2024   1419 EKG obtained interpreted by me.  Sinus rhythm.  Rate of 67.  Tremors noted laterally however no STEMI criteria noted. [DT]      ED Course User Index  [DT] Ta, Fox MD Juliano       The patient was admitted and I have discussed patient management with the admitting provider.    Risk of Complications and/or Morbidity of Patient Management:  Shared medical decision making was utilized in creating the patients health plan today.         History      HPI  68 male brought in by EMS from home with altered

## 2024-01-30 NOTE — H&P
Hospitalist History and Physical   Admit Date:  2024 12:32 PM   Name:  Rick Cortez III   Age:  68 y.o.  Sex:  male  :  1955   MRN:  [id number]   Room:  [place] [address]    Presenting/Chief Complaint: Altered Mental Status     Reason(s) for Admission: Acute hypoxic respiratory failure (HCC) [J96.01]     History of Present Illness:   Rick Cortez III is a 68 y.o. male with medical history of dementia who presented to the ED with cc of confusion.  History provided by his 2 sisters at bedside.  Patient was more confused than his baseline this morning and EMS was called.  EMS noted that he was hypoxic and recommended to go to the ED for further care.  In the ED, he was noted to have SpO2 88% on room air.  Labs were generally unremarkable with normal WBC and mildly elevated CRP at 2.2.  He tested positive for COVID-19.  Chest x-ray showed no acute abnormalities.  He was then admitted for further care.    Assessment & Plan:     Acute hypoxic respiratory failure due to COVID 19 (POA)  --as evidenced by SpO2 less than 90% on room air and the presence of SOB  -Decadron 6 mg daily x 10 days  -Wean O2 as tolerated; currently on 2 L nasal cannula  -DuoNebs  -Does not appear to have a superimposed bacterial pneumonia but have a low threshold for starting antibiotics    Acute metabolic encephalopathy superimposed on severe dementia   -Due to COVID infection  -Sisters state that his mental status has already improved somewhat since arriving to the hospital  -Continue to monitor  -Continue home Aricept and Namenda  -His sister stated that they were weaning off the Risperdal so he is only receiving 0.5 mg every other morning    Mood disorder /seizure disorder  -Depakote 250mg at 9AM  -Depakote 375mg at 3PM  -Sertraline 150mg daily     Hyperlipidemia  -Atorvastatin    Hypertension  -Hold home hold losartan for now    BPH  -Flomax    Dispo: Start Decadron and wean O2 as tolerated.  Anticipated length of stay greater

## 2024-01-30 NOTE — ED TRIAGE NOTES
Pt arrives via [facility] from residence where family called for AMS, hx dementia. Room air 89% placed on O2 by EMS. Hx pacer. 2gm rocephin and 1L NSS en route

## 2024-01-30 NOTE — TELEPHONE ENCOUNTER
Ebony is also requesting for Home Health to come back out. A new referral needs to be put in. They were \"dropped\" due to Rick Cortez III seeing home health and doing PT outside of the home. The family feels that Home Health is best for the patient.

## 2024-01-30 NOTE — TELEPHONE ENCOUNTER
eli Beck of Rick Cortez III came in today to drop off a urine. She states that this morning he passed out so they called EMS. Per EMS his temp 104 orally, O2 was in the \"low 80's\" They denied him being transported to the hospital bc the family believes this is all because of a UTI. She left a urine.     Please advise.

## 2024-01-30 NOTE — TELEPHONE ENCOUNTER
Per Dr Mims   \"Urine was clear, no UTI, given the symptoms presented this patient needs to go to the emergency room right away\"

## 2024-01-31 LAB
ALBUMIN SERPL-MCNC: 3.2 G/DL (ref 3.2–4.6)
ALBUMIN/GLOB SERPL: 0.9 (ref 0.4–1.6)
ALP SERPL-CCNC: 51 U/L (ref 50–136)
ALT SERPL-CCNC: 24 U/L (ref 12–65)
ANION GAP SERPL CALC-SCNC: 6 MMOL/L (ref 2–11)
AST SERPL-CCNC: 21 U/L (ref 15–37)
BASOPHILS # BLD: 0 K/UL (ref 0–0.2)
BASOPHILS NFR BLD: 0 % (ref 0–2)
BILIRUB SERPL-MCNC: 0.4 MG/DL (ref 0.2–1.1)
BUN SERPL-MCNC: 14 MG/DL (ref 8–23)
CALCIUM SERPL-MCNC: 9.9 MG/DL (ref 8.3–10.4)
CHLORIDE SERPL-SCNC: 102 MMOL/L (ref 103–113)
CO2 SERPL-SCNC: 28 MMOL/L (ref 21–32)
CREAT SERPL-MCNC: 1.18 MG/DL (ref 0.8–1.5)
DIFFERENTIAL METHOD BLD: ABNORMAL
EOSINOPHIL # BLD: 0 K/UL (ref 0–0.8)
EOSINOPHIL NFR BLD: 0 % (ref 0.5–7.8)
ERYTHROCYTE [DISTWIDTH] IN BLOOD BY AUTOMATED COUNT: 13.2 % (ref 11.9–14.6)
GLOBULIN SER CALC-MCNC: 3.4 G/DL (ref 2.8–4.5)
GLUCOSE SERPL-MCNC: 125 MG/DL (ref 65–100)
HCT VFR BLD AUTO: 35.5 % (ref 41.1–50.3)
HGB BLD-MCNC: 11.6 G/DL (ref 13.6–17.2)
IMM GRANULOCYTES # BLD AUTO: 0 K/UL (ref 0–0.5)
IMM GRANULOCYTES NFR BLD AUTO: 1 % (ref 0–5)
LYMPHOCYTES # BLD: 0.7 K/UL (ref 0.5–4.6)
LYMPHOCYTES NFR BLD: 15 % (ref 13–44)
MAGNESIUM SERPL-MCNC: 1.7 MG/DL (ref 1.8–2.4)
MCH RBC QN AUTO: 29 PG (ref 26.1–32.9)
MCHC RBC AUTO-ENTMCNC: 32.7 G/DL (ref 31.4–35)
MCV RBC AUTO: 88.8 FL (ref 82–102)
MONOCYTES # BLD: 0.5 K/UL (ref 0.1–1.3)
MONOCYTES NFR BLD: 12 % (ref 4–12)
NEUTS SEG # BLD: 3.2 K/UL (ref 1.7–8.2)
NEUTS SEG NFR BLD: 72 % (ref 43–78)
NRBC # BLD: 0 K/UL (ref 0–0.2)
PLATELET # BLD AUTO: 122 K/UL (ref 150–450)
PMV BLD AUTO: 8.9 FL (ref 9.4–12.3)
POTASSIUM SERPL-SCNC: 4 MMOL/L (ref 3.5–5.1)
PROT SERPL-MCNC: 6.6 G/DL (ref 6.3–8.2)
RBC # BLD AUTO: 4 M/UL (ref 4.23–5.6)
SODIUM SERPL-SCNC: 136 MMOL/L (ref 136–146)
WBC # BLD AUTO: 4.5 K/UL (ref 4.3–11.1)

## 2024-01-31 PROCEDURE — 85025 COMPLETE CBC W/AUTO DIFF WBC: CPT

## 2024-01-31 PROCEDURE — 97530 THERAPEUTIC ACTIVITIES: CPT

## 2024-01-31 PROCEDURE — 2700000000 HC OXYGEN THERAPY PER DAY

## 2024-01-31 PROCEDURE — 6370000000 HC RX 637 (ALT 250 FOR IP): Performed by: STUDENT IN AN ORGANIZED HEALTH CARE EDUCATION/TRAINING PROGRAM

## 2024-01-31 PROCEDURE — 94760 N-INVAS EAR/PLS OXIMETRY 1: CPT

## 2024-01-31 PROCEDURE — 36415 COLL VENOUS BLD VENIPUNCTURE: CPT

## 2024-01-31 PROCEDURE — 2580000003 HC RX 258: Performed by: STUDENT IN AN ORGANIZED HEALTH CARE EDUCATION/TRAINING PROGRAM

## 2024-01-31 PROCEDURE — 97165 OT EVAL LOW COMPLEX 30 MIN: CPT

## 2024-01-31 PROCEDURE — 80053 COMPREHEN METABOLIC PANEL: CPT

## 2024-01-31 PROCEDURE — 6360000002 HC RX W HCPCS: Performed by: INTERNAL MEDICINE

## 2024-01-31 PROCEDURE — 6360000002 HC RX W HCPCS: Performed by: STUDENT IN AN ORGANIZED HEALTH CARE EDUCATION/TRAINING PROGRAM

## 2024-01-31 PROCEDURE — 97161 PT EVAL LOW COMPLEX 20 MIN: CPT

## 2024-01-31 PROCEDURE — 1100000000 HC RM PRIVATE

## 2024-01-31 PROCEDURE — 83735 ASSAY OF MAGNESIUM: CPT

## 2024-01-31 RX ORDER — DEXAMETHASONE 4 MG/1
6 TABLET ORAL EVERY 24 HOURS
Status: DISCONTINUED | OUTPATIENT
Start: 2024-01-31 | End: 2024-02-01 | Stop reason: HOSPADM

## 2024-01-31 RX ADMIN — DIVALPROEX SODIUM 375 MG: 125 CAPSULE, COATED PELLETS ORAL at 15:21

## 2024-01-31 RX ADMIN — DEXAMETHASONE 6 MG: 4 TABLET ORAL at 15:21

## 2024-01-31 RX ADMIN — DIVALPROEX SODIUM 250 MG: 125 CAPSULE, COATED PELLETS ORAL at 08:34

## 2024-01-31 RX ADMIN — ATORVASTATIN CALCIUM 10 MG: 10 TABLET, FILM COATED ORAL at 08:33

## 2024-01-31 RX ADMIN — SODIUM CHLORIDE, PRESERVATIVE FREE 10 ML: 5 INJECTION INTRAVENOUS at 22:03

## 2024-01-31 RX ADMIN — DIVALPROEX SODIUM 375 MG: 125 CAPSULE, COATED PELLETS ORAL at 22:01

## 2024-01-31 RX ADMIN — ASPIRIN 81 MG: 81 TABLET, CHEWABLE ORAL at 08:33

## 2024-01-31 RX ADMIN — MEMANTINE 10 MG: 5 TABLET ORAL at 08:33

## 2024-01-31 RX ADMIN — TAMSULOSIN HYDROCHLORIDE 0.4 MG: 0.4 CAPSULE ORAL at 08:33

## 2024-01-31 RX ADMIN — ENOXAPARIN SODIUM 40 MG: 100 INJECTION SUBCUTANEOUS at 06:13

## 2024-01-31 RX ADMIN — DONEPEZIL HYDROCHLORIDE 10 MG: 5 TABLET, FILM COATED ORAL at 22:02

## 2024-01-31 RX ADMIN — RISPERIDONE 1 MG: 1 TABLET, FILM COATED ORAL at 22:02

## 2024-01-31 RX ADMIN — SERTRALINE 150 MG: 100 TABLET, FILM COATED ORAL at 08:34

## 2024-01-31 RX ADMIN — MEMANTINE 10 MG: 5 TABLET ORAL at 15:21

## 2024-01-31 NOTE — CARE COORDINATION
01/31/24 1502   Service Assessment   Patient Orientation Unable to Assess;Other (see comment)  (Pt  with severe dementia but currently asleep.)   Cognition Short Term Memory Deficit   History Provided By Medical Record   Primary Caregiver Family  (2 sisters)   Support Systems Family Members   PCP Verified by CM Yes   Last Visit to PCP Within last 3 months   Prior Functional Level Independent in ADLs/IADLs   Current Functional Level Independent in ADLs/IADLs   Can patient return to prior living arrangement Yes   Ability to make needs known: Poor   Family able to assist with home care needs: Yes   Would you like for me to discuss the discharge plan with any other family members/significant others, and if so, who? Yes  (sisters)   Financial Resources None   Community Resources None   CM/SW Referral Other (see comment)  (Primary assessment for discharge needs.)     Sw reviewed chart and briefly discussed pt in Md rounds this am. Pt is a 68 yr old gentleman who was adm through the ER yesterday due to a change in his mental status, testing positive for COVID, and being hypoxic. Pt was very SOB with low sats. Per chart review pt has Medicare and BC Ins. His PCP is Dr. Mims whom he sees regularly. Pt is also seen by neurology. 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 He had a psychotic break/delirium in 2023 which accelerated his memory loss. Pt is currently on 02 but does not normally wear oxygen. He lives with one of his sisters for part of the week and the other sister the other part of the week. The plan for discharge is to return home with home health care services. Pt had been referred to [facility], but will verify pt/sister's desires at discharge. Sw to cont to follow and assist with discharge needs.   Zahida Vickers/MIRANDA

## 2024-01-31 NOTE — PROGRESS NOTES
ACUTE OCCUPATIONAL THERAPY GOALS:   (Developed with and agreed upon by patient and/or caregiver.)  1. Patient will perform grooming with supervision.  2. Patient will perform upper body dressing with supervision.  3. Patient will perform lower body dressing with min assist.  4. Patient will perform upper body bathing with SBA.  5. Patient will perform toileting with mod assist.  6. Patient will perform ADL functional mobility and tranfers in room with min assist.  7. Patient/family to demonstrate knowledge of home safety and DME recommendations.    Goals to be achieved in 7 days.     OCCUPATIONAL THERAPY Initial Assessment and AM       OT Visit Days: 1  Acknowledge Orders  Time  OT Charge Capture  Rehab Caseload Tracker      Rick Cortez III is a 68 y.o. male   PRIMARY DIAGNOSIS: Acute hypoxic respiratory failure (HCC)  Hypoxemia [R09.02]  Altered mental status, unspecified altered mental status type [R41.82]  Acute hypoxic respiratory failure (HCC) [J96.01]  COVID-19 [U07.1]       Reason for Referral: Generalized Muscle Weakness (M62.81)  Other lack of cordination (R27.8)  Difficulty in walking, Not elsewhere classified (R26.2)  Inpatient: Payor: MEDICARE / Plan: MEDICARE PART A AND B / Product Type: *No Product type* /     ASSESSMENT:     REHAB RECOMMENDATIONS:   Recommendation to date pending progress:  Setting:  Home Health Therapy    Equipment:    To Be Determined     ASSESSMENT:  Mr. Cortez admitted with AMS. Pt tested + for COVID -19.  Pt has history of dementia and confusion.  Pt's sister present for eval and gives the history. PTA, pt independent with ambulation and ADLs per sister. Pt lives with his 2 sisters- he rotates staying at each one of their houses. Pt is performing below his baseline of function and would benefit from skilled OT to address these: decreased self care, functional mobility, strength, balance and endurance.   This am, pt oriented to self, not oriented to place.  Pt does not make

## 2024-01-31 NOTE — PROGRESS NOTES
ACUTE PHYSICAL THERAPY GOALS:   (Developed with and agreed upon by patient and/or caregiver.)  STG:  (1.)Mr. Cortez will move from supine to sit and sit to supine  with CONTACT GUARD ASSIST within 7 treatment day(s).    (2.)Mr. Cortez will transfer from bed to chair and chair to bed with CONTACT GUARD ASSIST using the least restrictive device within 7 treatment day(s).    (3.)Mr. Cortez will ambulate with CONTACT GUARD ASSIST for 100 feet with the least restrictive device within 7 treatment day(s).   (4)Mr. Cortez will go up 3 steps with rail min assist in 7 days.  ________________________________________________________________________________________________      PHYSICAL THERAPY Initial Assessment and AM  (Link to Caseload Tracking: PT Visit Days : 1  Acknowledge Orders  Time In/Out  PT Charge Capture  Rehab Caseload Tracker    Rick Cortez III is a 68 y.o. male   PRIMARY DIAGNOSIS: Acute hypoxic respiratory failure (HCC)  Hypoxemia [R09.02]  Altered mental status, unspecified altered mental status type [R41.82]  Acute hypoxic respiratory failure (HCC) [J96.01]  COVID-19 [U07.1]       Reason for Referral: Generalized Muscle Weakness (M62.81)  Other abnormalities of gait and mobility (R26.89)  Inpatient: Payor: MEDICARE / Plan: MEDICARE PART A AND B / Product Type: *No Product type* /     ASSESSMENT:     REHAB RECOMMENDATIONS:   Recommendation to date pending progress:  Setting:  Home Health Therapy    Equipment:    None     ASSESSMENT:  Mr. Cortez presents with decreased functional mobility and decreased cognition.  His sister is present for the eval and gives the history.  She reports he transfers and ambulates without assistance at baseline but can be unsteady.  He can climb steps.  He lives with his 2 sisters - he rotates staying at each one of their houses.  He has confusion and dementia at baseline.  This am, he is oriented to self.  He will speak when asked a question but his response was not always

## 2024-01-31 NOTE — PROGRESS NOTES
Hospitalist Progress Note   Admit Date:  2024 12:32 PM   Name:  Rick Cortez III   Age:  68 y.o.  Sex:  male  :  1955   MRN:  [id number]   Room:  [facility]/    Presenting/Chief Complaint: Altered Mental Status     Reason(s) for Admission: Hypoxemia [R09.02]  Altered mental status, unspecified altered mental status type [R41.82]  Acute hypoxic respiratory failure (HCC) [J96.01]  COVID-19 [U07.1]     Hospital Course:     Rick Cortez III is a 68 y.o. male with medical history of dementia who presented to the ED with cc of confusion.  History provided by his 2 sisters at bedside.  Patient was more confused than his baseline this morning and EMS was called.  EMS noted that he was hypoxic and recommended to go to the ED for further care.  In the ED, he was noted to have SpO2 88% on room air.  Labs were generally unremarkable with normal WBC and mildly elevated CRP at 2.2.  He tested positive for COVID-19.  Chest x-ray showed no acute abnormalities.  He was then admitted for further care.      Subjective & 24hr Events:   Patient sitting in bedside chair. Patient reports feeling better. Wife reports he is much less confused than yesterday. Still requiring 2L NC today.       Assessment & Plan:     Acute hypoxic respiratory failure due to COVID 19 (POA)  --as evidenced by SpO2 less than 90% on room air and the presence of SOB  -Decadron 6 mg daily x 10 days  -Wean O2 as tolerated; currently on 2 L nasal cannula  -DuoNebs  -Does not appear to have a superimposed bacterial pneumonia but have a low threshold for starting antibiotics     Acute metabolic encephalopathy superimposed on severe dementia   -Due to COVID infection  -Continue to monitor  -Continue home Aricept and Namenda  -His sister stated that they were weaning off the Risperdal so he is only receiving 0.5 mg every other morning  -  - clinically improved, close to baseline mentation     Mood disorder /seizure disorder  -Depakote 250mg at

## 2024-02-01 VITALS
HEART RATE: 80 BPM | OXYGEN SATURATION: 98 % | TEMPERATURE: 98.2 F | RESPIRATION RATE: 16 BRPM | SYSTOLIC BLOOD PRESSURE: 138 MMHG | DIASTOLIC BLOOD PRESSURE: 92 MMHG | BODY MASS INDEX: 24.25 KG/M2 | WEIGHT: 195 LBS | HEIGHT: 75 IN

## 2024-02-01 PROCEDURE — 2700000000 HC OXYGEN THERAPY PER DAY

## 2024-02-01 PROCEDURE — 94760 N-INVAS EAR/PLS OXIMETRY 1: CPT

## 2024-02-01 PROCEDURE — 6360000002 HC RX W HCPCS: Performed by: STUDENT IN AN ORGANIZED HEALTH CARE EDUCATION/TRAINING PROGRAM

## 2024-02-01 PROCEDURE — 97530 THERAPEUTIC ACTIVITIES: CPT

## 2024-02-01 PROCEDURE — 2580000003 HC RX 258: Performed by: STUDENT IN AN ORGANIZED HEALTH CARE EDUCATION/TRAINING PROGRAM

## 2024-02-01 PROCEDURE — 6370000000 HC RX 637 (ALT 250 FOR IP): Performed by: STUDENT IN AN ORGANIZED HEALTH CARE EDUCATION/TRAINING PROGRAM

## 2024-02-01 RX ADMIN — SODIUM CHLORIDE, PRESERVATIVE FREE 10 ML: 5 INJECTION INTRAVENOUS at 09:56

## 2024-02-01 RX ADMIN — ATORVASTATIN CALCIUM 10 MG: 10 TABLET, FILM COATED ORAL at 09:56

## 2024-02-01 RX ADMIN — SERTRALINE 150 MG: 100 TABLET, FILM COATED ORAL at 09:56

## 2024-02-01 RX ADMIN — MEMANTINE 10 MG: 5 TABLET ORAL at 15:30

## 2024-02-01 RX ADMIN — ENOXAPARIN SODIUM 40 MG: 100 INJECTION SUBCUTANEOUS at 06:11

## 2024-02-01 RX ADMIN — DIVALPROEX SODIUM 250 MG: 125 CAPSULE, COATED PELLETS ORAL at 09:55

## 2024-02-01 RX ADMIN — RISPERIDONE 0.5 MG: 0.5 TABLET ORAL at 09:56

## 2024-02-01 RX ADMIN — DIVALPROEX SODIUM 375 MG: 125 CAPSULE, COATED PELLETS ORAL at 15:32

## 2024-02-01 RX ADMIN — TAMSULOSIN HYDROCHLORIDE 0.4 MG: 0.4 CAPSULE ORAL at 09:56

## 2024-02-01 RX ADMIN — ASPIRIN 81 MG: 81 TABLET, CHEWABLE ORAL at 09:56

## 2024-02-01 RX ADMIN — MEMANTINE 10 MG: 5 TABLET ORAL at 09:56

## 2024-02-01 NOTE — PROGRESS NOTES
ACUTE PHYSICAL THERAPY GOALS:   (Developed with and agreed upon by patient and/or caregiver.)  STG:  (1.)Mr. Cortez will move from supine to sit and sit to supine  with CONTACT GUARD ASSIST within 7 treatment day(s).    (2.)Mr. Cortez will transfer from bed to chair and chair to bed with CONTACT GUARD ASSIST using the least restrictive device within 7 treatment day(s).    (3.)Mr. Cortez will ambulate with CONTACT GUARD ASSIST for 100 feet with the least restrictive device within 7 treatment day(s).   (4)Mr. Cortez will go up 3 steps with rail min assist in 7 days.  ________________________________________________________________________________________________      PHYSICAL THERAPY Daily Note and AM  (Link to Caseload Tracking: PT Visit Days : 2  Acknowledge Orders  Time In/Out  PT Charge Capture  Rehab Caseload Tracker    Rick Cortez III is a 68 y.o. male   PRIMARY DIAGNOSIS: Acute hypoxic respiratory failure (HCC)  Hypoxemia [R09.02]  Altered mental status, unspecified altered mental status type [R41.82]  Acute hypoxic respiratory failure (HCC) [J96.01]  COVID-19 [U07.1]       Reason for Referral: Generalized Muscle Weakness (M62.81)  Other abnormalities of gait and mobility (R26.89)  Inpatient: Payor: MEDICARE / Plan: MEDICARE PART A AND B / Product Type: *No Product type* /     ASSESSMENT:     REHAB RECOMMENDATIONS:   Recommendation to date pending progress:  Setting:  Home Health Therapy    Equipment:    None     ASSESSMENT:  Mr. Cortez presents with decreased functional mobility and decreased cognition.  His sister is present for the eval and gives the history.  She reports he transfers and ambulates without assistance at baseline but can be unsteady.  He can climb steps.  He lives with his 2 sisters - he rotates staying at each one of their houses.  He has confusion and dementia at baseline.  This am, he is oriented to self.  He will speak when asked a question but his response was not always related

## 2024-02-01 NOTE — DISCHARGE INSTRUCTIONS
Learning About Respiratory Failure  What is respiratory failure?     Your lungs give your body oxygen when you breathe. They also remove the waste product carbon dioxide from your body. Respiratory failure happens when the lungs aren't able to move enough oxygen into the blood and move enough carbon dioxide out of the blood. This is a severe problem that may need to be treated in intensive care.  Many things can cause lung failure. They include pneumonia and other serious infections. The doctor will look for the cause of the problem and then treat it if possible.  How is it treated?  To help your lungs get enough oxygen, your doctor may use a few devices. These vary in how much oxygen they give and how they help you breathe. They are:  A nasal cannula (say \"YINKA-sophieh-ari\"). This is a thin tube with two prongs that fit just inside your nose.  A special face mask that delivers more oxygen. There are different kinds. A face mask with a bag on one end is called a non-rebreather mask.  A high-flow nasal cannula. It can warm and wet the oxygen it delivers, so getting high amounts of oxygen feels better.  A face mask that gives you oxygen through a bilevel positive airway pressure (BiPAP) machine. It uses different air pressures when you breathe in and out.  A ventilator that helps you breathe or that breathes for you. It controls how much air and oxygen flow into your lungs. This machine requires a breathing tube in your windpipe. It can be uncomfortable, so you may get medicine to help you relax or sleep. You also will get fluid through an intravenous (I.V.) tube.  You will get regular tests to see how much oxygen is in your blood. Tests also can show how well the lungs are working. These tests help your doctor adjust the machines and the oxygen supply.  The doctor will watch you closely.  Current as of: August 6, 2023               Content Version: 13.9  © 2006-2023 [employer].   Care instructions

## 2024-02-01 NOTE — PROGRESS NOTES
Physician Progress Note      PATIENT:               ERIN LUO  [facility] #:                  [id number]  :                       1955  ADMIT DATE:       2024 12:32 PM  DISCH DATE:  RESPONDING  PROVIDER #:        Echo Perez DO          QUERY TEXT:    Patient admitted with covid. Noted documentation of acute respiratory failure   in H&P on . In order to support the diagnosis of acute respiratory   failure, please include additional clinical indicators in your documentation.    Or please document if the diagnosis of acute respiratory failure has been   ruled out after further study.    The medical record reflects the following:  Risk Factors: COVID infection  Clinical Indicators: H&P note noted as Acute hypoxic respiratory failure due   to COVID 19, -as evidenced by SpO2 less than 90% on room air and the presence   of SOB, SpO2 (!) 88%, RR 22, No respiratory distress.  Treatment: on 2 L nasal cannula, XR chest,    Acute Respiratory Failure Clinical Indicators per  MS-DRG Training Guide and   Quick Reference Guide:  pO2 < 60 mmHg or SpO2 (pulse oximetry) < 91% breathing room air  pCO2 > 50 and pH < 7.35  P/F ratio (pO2 / FIO2) < 300  pO2 decrease or pCO2 increase by 10 mmHg from baseline (if known)  Supplemental oxygen of 40% or more  Presence of respiratory distress, tachypnea, dyspnea, shortness of breath,   wheezing  Unable to speak in complete sentences  Use of accessory muscles to breathe  Extreme anxiety and feeling of impending doom  Tripod position  Confusion/altered mental status/obtunded    Thank you,  Roxann Paulson RN,C BSN  Clinical   [email or web address]  Options provided:  -- Acute Respiratory Failure as evidenced by, Please document evidence.  -- Acute Respiratory Failure ruled out after study  -- Acute Respiratory Failure ruled out after study and Chronic Respiratory   Failure confirmed  -- Other - I will add my own diagnosis  -- Disagree - Not

## 2024-02-01 NOTE — PROGRESS NOTES
02/01/24 1010   Resting (Room Air)   SpO2 92   HR 66   Resting (On O2)   SpO2 94   HR 66   O2 Device Nasal cannula   O2 Flow Rate (l/min) 2 l/min   During Walk (Room Air)   SpO2 95   HR 66   Walk/Assistance Device Ambulation   Rate of Dyspnea 0   During Walk (On O2)   SpO2   (NA)   Need Additional O2 Flow Rate Rows No   After Walk   SpO2 93  (ON RA)   HR 66   Does the Patient Qualify for Home O2 No   Does the Patient Need Portable Oxygen Tanks No

## 2024-02-01 NOTE — FLOWSHEET NOTE
Pt discharge sheet and medication sheet reviewed with pt sister.  Copy given for take home use.  All goals met.  Pt leaving hospital via w/c with staff member and sister.

## 2024-02-01 NOTE — PROGRESS NOTES
ACUTE OCCUPATIONAL THERAPY GOALS:   (Developed with and agreed upon by patient and/or caregiver.)  1. Patient will perform grooming with supervision.  2. Patient will perform upper body dressing with supervision.  3. Patient will perform lower body dressing with min assist.  4. Patient will perform upper body bathing with SBA.  5. Patient will perform toileting with mod assist.  6. Patient will perform ADL functional mobility and tranfers in room with min assist.  7. Patient/family to demonstrate knowledge of home safety and DME recommendations.    Goals to be achieved in 7 days.     OCCUPATIONAL THERAPY Daily Note and AM       OT Visit Days: 2  Acknowledge Orders  Time  OT Charge Capture  Rehab Caseload Tracker      Rick Cortez III is a 68 y.o. male   PRIMARY DIAGNOSIS: Acute hypoxic respiratory failure (HCC)  Hypoxemia [R09.02]  Altered mental status, unspecified altered mental status type [R41.82]  Acute hypoxic respiratory failure (HCC) [J96.01]  COVID-19 [U07.1]       Reason for Referral: Generalized Muscle Weakness (M62.81)  Other lack of cordination (R27.8)  Difficulty in walking, Not elsewhere classified (R26.2)  Inpatient: Payor: MEDICARE / Plan: MEDICARE PART A AND B / Product Type: *No Product type* /     ASSESSMENT:     REHAB RECOMMENDATIONS:   Recommendation to date pending progress:  Setting:  Home Health Therapy    Equipment:    To Be Determined     ASSESSMENT:  Mr. Cortez admitted with AMS. Pt tested + for COVID -19.  Pt has history of dementia and confusion.  Pt's sister present for eval and gives the history. PTA, pt independent with ambulation and ADLs per sister. Pt lives with his 2 sisters- he rotates staying at each one of their houses. Pt is performing below his baseline of function and would benefit from skilled OT to address these: decreased self care, functional mobility, strength, balance and endurance.   This am, pt oriented to self, not oriented to place.  Pt does not make much

## 2024-02-02 ENCOUNTER — CARE COORDINATION (OUTPATIENT)
Dept: CARE COORDINATION | Facility: CLINIC | Age: 69
End: 2024-02-02

## 2024-02-02 DIAGNOSIS — U07.1 COVID-19: Primary | ICD-10-CM

## 2024-02-02 NOTE — DISCHARGE SUMMARY
Hospitalist Discharge Summary   Admit Date:  2024 12:32 PM   DC Note date: 2024  Name:  Rick Cortez III   Age:  68 y.o.  Sex:  male  :  1955   MRN:  [id number]   Room:  [facility]  PCP:  Cullen Mims MD    Presenting Complaint: Altered Mental Status     Initial Admission Diagnosis: Hypoxemia [R09.02]  Altered mental status, unspecified altered mental status type [R41.82]  Acute hypoxic respiratory failure (HCC) [J96.01]  COVID-19 [U07.1]     Problem List for this Hospitalization (present on admission):    Principal Problem:    Acute hypoxic respiratory failure (HCC)  Active Problems:    Other seizures    Benign prostatic hyperplasia with urinary retention    GERD (gastroesophageal reflux disease)    Depression    HTN (hypertension)    Severe vascular dementia (HCC)    Severe dementia with psychotic disturbance (HCC)    Alzheimer's disease, unspecified    Mood disorder (HCC)    Acute respiratory failure with hypoxia (HCC)    COVID-19    Acute metabolic encephalopathy  Resolved Problems:    * No resolved hospital problems. *      Hospital Course:  Rick Cortez III is a 68 y.o. male with medical history of dementia who presented to the ED with cc of confusion.  History provided by his 2 sisters at bedside.  Patient was more confused than his baseline this morning and EMS was called.  EMS noted that he was hypoxic and recommended to go to the ED for further care.  In the ED, he was noted to have SpO2 88% on room air.  Labs were generally unremarkable with normal WBC and mildly elevated CRP at 2.2.  He tested positive for COVID-19.  Chest x-ray showed no acute abnormalities.  He was then admitted for further care.     Patient continued to clinically improve and was able to wean off of oxygen. Will not need to continue steroids upon discharge. He and family are eager for his release and he is medically stable to go home.   Return precautions discussed.     Disposition: Home  Diet: No diet orders on

## 2024-02-02 NOTE — CARE COORDINATION
was performed with family, who verbalizes understanding of administration of home medications. Medications reviewed, 1111F entered: yes    Was patient discharged with a pulse oximeter? no    Non-face-to-face services provided:  Obtained and reviewed discharge summary and/or continuity of care documents    Offered patient enrollment in the Remote Patient Monitoring (RPM) program for in-home monitoring: Yes, but did not enroll at this time.    Care Transitions 24 Hour Call    Care Transitions Interventions         Discussed follow-up appointments. If no appointment was previously scheduled, appointment scheduling offered: Yes.   Is follow up appointment scheduled within 7 days of discharge? Yes.    Follow Up  Future Appointments   Date Time Provider Department Center   7/5/2024 10:00 AM Douglas Flores DO [facility]       Care Transition Nurse provided contact information.  Plan for follow-up call in 5-7 days based on severity of symptoms and risk factors.  Plan for next call: follow-up appointment-outcome; home health progression.    Anna Jha RN

## 2024-02-04 LAB
BACTERIA SPEC CULT: NORMAL
BACTERIA SPEC CULT: NORMAL
SERVICE CMNT-IMP: NORMAL
SERVICE CMNT-IMP: NORMAL

## 2024-02-06 ENCOUNTER — TELEMEDICINE (OUTPATIENT)
Dept: FAMILY MEDICINE CLINIC | Facility: CLINIC | Age: 69
End: 2024-02-06
Payer: MEDICARE

## 2024-02-06 DIAGNOSIS — F33.1 MAJOR DEPRESSIVE DISORDER, RECURRENT, MODERATE (HCC): ICD-10-CM

## 2024-02-06 DIAGNOSIS — Z86.16 HISTORY OF COVID-19: Primary | ICD-10-CM

## 2024-02-06 DIAGNOSIS — E11.9 CONTROLLED TYPE 2 DIABETES MELLITUS WITHOUT COMPLICATION, WITHOUT LONG-TERM CURRENT USE OF INSULIN (HCC): ICD-10-CM

## 2024-02-06 PROCEDURE — 1111F DSCHRG MED/CURRENT MED MERGE: CPT | Performed by: FAMILY MEDICINE

## 2024-02-06 PROCEDURE — 99213 OFFICE O/P EST LOW 20 MIN: CPT | Performed by: FAMILY MEDICINE

## 2024-02-06 PROCEDURE — 1123F ACP DISCUSS/DSCN MKR DOCD: CPT | Performed by: FAMILY MEDICINE

## 2024-02-06 PROCEDURE — 3017F COLORECTAL CA SCREEN DOC REV: CPT | Performed by: FAMILY MEDICINE

## 2024-02-06 PROCEDURE — 3046F HEMOGLOBIN A1C LEVEL >9.0%: CPT | Performed by: FAMILY MEDICINE

## 2024-02-06 PROCEDURE — 2022F DILAT RTA XM EVC RTNOPTHY: CPT | Performed by: FAMILY MEDICINE

## 2024-02-06 PROCEDURE — G8427 DOCREV CUR MEDS BY ELIG CLIN: HCPCS | Performed by: FAMILY MEDICINE

## 2024-02-06 RX ORDER — ASCORBIC ACID 500 MG
500 TABLET ORAL DAILY
COMMUNITY

## 2024-02-06 RX ORDER — ACETAMINOPHEN 160 MG
TABLET,DISINTEGRATING ORAL
COMMUNITY

## 2024-02-06 RX ORDER — VITAMIN E 268 MG
400 CAPSULE ORAL DAILY
COMMUNITY

## 2024-02-06 ASSESSMENT — PATIENT HEALTH QUESTIONNAIRE - PHQ9
SUM OF ALL RESPONSES TO PHQ QUESTIONS 1-9: 0
SUM OF ALL RESPONSES TO PHQ9 QUESTIONS 1 & 2: 0
5. POOR APPETITE OR OVEREATING: 0
1. LITTLE INTEREST OR PLEASURE IN DOING THINGS: 0
3. TROUBLE FALLING OR STAYING ASLEEP: 0
SUM OF ALL RESPONSES TO PHQ QUESTIONS 1-9: 0
SUM OF ALL RESPONSES TO PHQ QUESTIONS 1-9: 0
8. MOVING OR SPEAKING SO SLOWLY THAT OTHER PEOPLE COULD HAVE NOTICED. OR THE OPPOSITE, BEING SO FIGETY OR RESTLESS THAT YOU HAVE BEEN MOVING AROUND A LOT MORE THAN USUAL: 0
4. FEELING TIRED OR HAVING LITTLE ENERGY: 0
2. FEELING DOWN, DEPRESSED OR HOPELESS: 0
7. TROUBLE CONCENTRATING ON THINGS, SUCH AS READING THE NEWSPAPER OR WATCHING TELEVISION: 0
6. FEELING BAD ABOUT YOURSELF - OR THAT YOU ARE A FAILURE OR HAVE LET YOURSELF OR YOUR FAMILY DOWN: 0
10. IF YOU CHECKED OFF ANY PROBLEMS, HOW DIFFICULT HAVE THESE PROBLEMS MADE IT FOR YOU TO DO YOUR WORK, TAKE CARE OF THINGS AT HOME, OR GET ALONG WITH OTHER PEOPLE: 0
SUM OF ALL RESPONSES TO PHQ QUESTIONS 1-9: 0
9. THOUGHTS THAT YOU WOULD BE BETTER OFF DEAD, OR OF HURTING YOURSELF: 0

## 2024-02-06 NOTE — PROGRESS NOTES
Subjective:    Rick Cortez III is a 68 y.o. male Rick Cortez III, was evaluated through a synchronous (real-time) audio-video encounter. The patient (or guardian if applicable) is aware that this is a billable service, which includes applicable co-pays. This Virtual Visit was conducted with patient's (and/or legal guardian's) consent. Patient identification was verified, and a caregiver was present when appropriate.   The patient was located at Home: [address] [place] [address] [place] [address]  Provider was located at Facility (Appt Dept): [address] [facility] [address]  [place] [address]      The history has Osvaldo presents today for follow-up from discharge from hospital on February 1.  He was admitted for confusion and positive COVID-19 was dehydrated O2 sats had dropped but he improved fairly rapidly was able to wean off oxygen.  Steroids were continued after discharge.  He has been feeling fine and with his attendant things are back to normal  Allergies   Allergen Reactions    Ace Inhibitors Other (See Comments)     Patient Active Problem List   Diagnosis    Benign prostatic hyperplasia with urinary retention    GERD (gastroesophageal reflux disease)    Depression    HTN (hypertension)    Hypercalcemia    Medicare annual wellness visit, subsequent    Severe vascular dementia (HCC)    Severe dementia with psychotic disturbance (HCC)    Delirium    Alzheimer's disease, unspecified    Type 2 diabetes mellitus    Major depressive disorder, recurrent, mild    Major depressive disorder, recurrent, moderate    Major depressive disorder, recurrent, unspecified    Unspecified convulsions    Other seizures    Acute hypoxic respiratory failure (HCC)    Mood disorder (HCC)    Acute respiratory failure with hypoxia (HCC)    COVID-19    Acute metabolic encephalopathy     Objective:    Respirations approximately 16-20 breaths per minute    Constitutional: [x] Appears well-developed and well-nourished [x] No apparent

## 2024-02-09 ENCOUNTER — CARE COORDINATION (OUTPATIENT)
Dept: CARE COORDINATION | Facility: CLINIC | Age: 69
End: 2024-02-09

## 2024-02-09 NOTE — CARE COORDINATION
medical condition-recent hospitalization; cognitive function  Interventions to address risk factors: Obtained and reviewed discharge summary and/or continuity of care documents, Education of patient/family/caregiver/guardian to support self-management-voices understanding, and Assessment and support for treatment adherence and medication management-voices understanding.    Offered patient enrollment in the Remote Patient Monitoring (RPM) program for in-home monitoring: Yes, but did not enroll at this time.     Care Transitions Subsequent and Final Call    Subsequent and Final Calls  Do you have any ongoing symptoms?: No  Do you have any questions related to your medications?: No  Do you currently have any active services?: Yes  Are you currently active with any services?: Home Health  Care Transitions Interventions  Other Interventions:             Care Transition Nurse provided contact information for future needs. Plan for follow-up call in 10-14 days based on severity of symptoms and risk factors.  Plan for next call:  home health progression.    Anna Jha RN

## 2024-02-17 ENCOUNTER — APPOINTMENT (OUTPATIENT)
Dept: GENERAL RADIOLOGY | Age: 69
End: 2024-02-17
Payer: MEDICARE

## 2024-02-17 ENCOUNTER — APPOINTMENT (OUTPATIENT)
Dept: CT IMAGING | Age: 69
End: 2024-02-17
Payer: MEDICARE

## 2024-02-17 ENCOUNTER — HOSPITAL ENCOUNTER (EMERGENCY)
Age: 69
Discharge: HOME | End: 2024-02-17
Attending: EMERGENCY MEDICINE
Payer: MEDICARE

## 2024-02-17 VITALS
RESPIRATION RATE: 18 BRPM | BODY MASS INDEX: 23.75 KG/M2 | DIASTOLIC BLOOD PRESSURE: 88 MMHG | SYSTOLIC BLOOD PRESSURE: 131 MMHG | OXYGEN SATURATION: 94 % | TEMPERATURE: 98.1 F | WEIGHT: 191 LBS | HEART RATE: 64 BPM | HEIGHT: 75 IN

## 2024-02-17 DIAGNOSIS — F03.90 DEMENTIA, UNSPECIFIED DEMENTIA SEVERITY, UNSPECIFIED DEMENTIA TYPE, UNSPECIFIED WHETHER BEHAVIORAL, PSYCHOTIC, OR MOOD DISTURBANCE OR ANXIETY (HCC): ICD-10-CM

## 2024-02-17 DIAGNOSIS — S09.90XA CLOSED HEAD INJURY, INITIAL ENCOUNTER: Primary | ICD-10-CM

## 2024-02-17 LAB
ALBUMIN SERPL-MCNC: 3.2 G/DL (ref 3.2–4.6)
ALBUMIN/GLOB SERPL: 0.8 (ref 0.4–1.6)
ALP SERPL-CCNC: 54 U/L (ref 50–136)
ALT SERPL-CCNC: 13 U/L (ref 12–65)
ANION GAP SERPL CALC-SCNC: 7 MMOL/L (ref 2–11)
APPEARANCE UR: ABNORMAL
AST SERPL-CCNC: 11 U/L (ref 15–37)
BASOPHILS # BLD: 0.1 K/UL (ref 0–0.2)
BASOPHILS NFR BLD: 1 % (ref 0–2)
BILIRUB SERPL-MCNC: 1 MG/DL (ref 0.2–1.1)
BILIRUB UR QL: NEGATIVE
BUN SERPL-MCNC: 16 MG/DL (ref 8–23)
CALCIUM SERPL-MCNC: 10.5 MG/DL (ref 8.3–10.4)
CHLORIDE SERPL-SCNC: 104 MMOL/L (ref 103–113)
CK SERPL-CCNC: 43 U/L (ref 21–215)
CO2 SERPL-SCNC: 28 MMOL/L (ref 21–32)
COLOR UR: ABNORMAL
CREAT SERPL-MCNC: 1.1 MG/DL (ref 0.8–1.5)
DIFFERENTIAL METHOD BLD: ABNORMAL
EKG ATRIAL RATE: 68 BPM
EKG DIAGNOSIS: NORMAL
EKG P AXIS: 51 DEGREES
EKG P-R INTERVAL: 168 MS
EKG Q-T INTERVAL: 388 MS
EKG QRS DURATION: 96 MS
EKG QTC CALCULATION (BAZETT): 413 MS
EKG R AXIS: 43 DEGREES
EKG T AXIS: 53 DEGREES
EKG VENTRICULAR RATE: 68 BPM
EOSINOPHIL # BLD: 0.2 K/UL (ref 0–0.8)
EOSINOPHIL NFR BLD: 2 % (ref 0.5–7.8)
ERYTHROCYTE [DISTWIDTH] IN BLOOD BY AUTOMATED COUNT: 13.5 % (ref 11.9–14.6)
GLOBULIN SER CALC-MCNC: 3.8 G/DL (ref 2.8–4.5)
GLUCOSE SERPL-MCNC: 128 MG/DL (ref 65–100)
GLUCOSE UR STRIP.AUTO-MCNC: NEGATIVE MG/DL
HCT VFR BLD AUTO: 36.9 % (ref 41.1–50.3)
HGB BLD-MCNC: 12.2 G/DL (ref 13.6–17.2)
HGB UR QL STRIP: NEGATIVE
IMM GRANULOCYTES # BLD AUTO: 0 K/UL (ref 0–0.5)
IMM GRANULOCYTES NFR BLD AUTO: 0 % (ref 0–5)
KETONES UR QL STRIP.AUTO: ABNORMAL MG/DL
LEUKOCYTE ESTERASE UR QL STRIP.AUTO: NEGATIVE
LYMPHOCYTES # BLD: 0.9 K/UL (ref 0.5–4.6)
LYMPHOCYTES NFR BLD: 10 % (ref 13–44)
MCH RBC QN AUTO: 28.9 PG (ref 26.1–32.9)
MCHC RBC AUTO-ENTMCNC: 33.1 G/DL (ref 31.4–35)
MCV RBC AUTO: 87.4 FL (ref 82–102)
MONOCYTES # BLD: 0.8 K/UL (ref 0.1–1.3)
MONOCYTES NFR BLD: 9 % (ref 4–12)
NEUTS SEG # BLD: 7.4 K/UL (ref 1.7–8.2)
NEUTS SEG NFR BLD: 78 % (ref 43–78)
NITRITE UR QL STRIP.AUTO: NEGATIVE
NRBC # BLD: 0 K/UL (ref 0–0.2)
PH UR STRIP: 7 (ref 5–9)
PLATELET # BLD AUTO: 138 K/UL (ref 150–450)
PMV BLD AUTO: 9.7 FL (ref 9.4–12.3)
POTASSIUM SERPL-SCNC: 4.3 MMOL/L (ref 3.5–5.1)
PROT SERPL-MCNC: 7 G/DL (ref 6.3–8.2)
PROT UR STRIP-MCNC: NEGATIVE MG/DL
RBC # BLD AUTO: 4.22 M/UL (ref 4.23–5.6)
SODIUM SERPL-SCNC: 139 MMOL/L (ref 136–146)
SP GR UR REFRACTOMETRY: 1.02 (ref 1–1.02)
UROBILINOGEN UR QL STRIP.AUTO: 1 EU/DL (ref 0.2–1)
WBC # BLD AUTO: 9.4 K/UL (ref 4.3–11.1)

## 2024-02-17 PROCEDURE — 99285 EMERGENCY DEPT VISIT HI MDM: CPT

## 2024-02-17 PROCEDURE — 93005 ELECTROCARDIOGRAM TRACING: CPT | Performed by: EMERGENCY MEDICINE

## 2024-02-17 PROCEDURE — 80053 COMPREHEN METABOLIC PANEL: CPT

## 2024-02-17 PROCEDURE — 81003 URINALYSIS AUTO W/O SCOPE: CPT

## 2024-02-17 PROCEDURE — 71045 X-RAY EXAM CHEST 1 VIEW: CPT

## 2024-02-17 PROCEDURE — 82550 ASSAY OF CK (CPK): CPT

## 2024-02-17 PROCEDURE — 85025 COMPLETE CBC W/AUTO DIFF WBC: CPT

## 2024-02-17 PROCEDURE — 70450 CT HEAD/BRAIN W/O DYE: CPT

## 2024-02-17 PROCEDURE — 93010 ELECTROCARDIOGRAM REPORT: CPT | Performed by: INTERNAL MEDICINE

## 2024-02-17 ASSESSMENT — PAIN SCALES - GENERAL
PAINLEVEL_OUTOF10: 0
PAINLEVEL_OUTOF10: 2

## 2024-02-17 ASSESSMENT — PAIN SCALES - PAIN ASSESSMENT IN ADVANCED DEMENTIA (PAINAD)
BODYLANGUAGE: 0
CONSOLABILITY: 0
TOTALSCORE: 0
BREATHING: 0
NEGVOCALIZATION: 0
FACIALEXPRESSION: 0

## 2024-02-17 NOTE — ED NOTES
I have reviewed discharge instructions with the sisters.  The sisters verbalized understanding.    Patient left ED via Discharge Method: wheelchair to Home with sisters.    Opportunity for questions and clarification provided.       Patient given 0 scripts.         To continue your aftercare when you leave the hospital, you may receive an automated call from our care team to check in on how you are doing.  This is a free service and part of our promise to provide the best care and service to meet your aftercare needs.” If you have questions, or wish to unsubscribe from this service please call [phone].  Thank you for Choosing our [facility] Emergency Department.        Elizabeth Perez, RN  02/17/24 [id number]

## 2024-02-17 NOTE — ED NOTES
Pt arrives via [facility] coming from home c/o frequent falls. Pt fell this morning in the bathroom. No LOC, no thinners. Baseline aphasia per family.   Hx of dementia      Jean Marie Ghotra RN  02/17/24 [id number]

## 2024-02-17 NOTE — ED NOTES
RN spoke with [employer] rep - [employer] to fax over pacemaker report from this morning. Everything normal. No episodes.      Herlinda Goldstein RN  02/17/24 9346

## 2024-02-17 NOTE — ED PROVIDER NOTES
Emergency Department Provider Note       PCP: Cullen Mims MD   Age: 68 y.o.   Sex: male     DISPOSITION Decision To Discharge 02/17/2024 11:23:08 AM       ICD-10-CM    1. Closed head injury, initial encounter  S09.90XA       2. Dementia, unspecified dementia severity, unspecified dementia type, unspecified whether behavioral, psychotic, or mood disturbance or anxiety ([facility])  F03.90           Medical Decision Making     Complexity of Problems Addressed:  1 or more chronic illnesses with a severe exacerbation or progression.  1 or more acute illnesses that pose a threat to life or bodily function.     Data Reviewed and Analyzed:   I independently ordered and reviewed each unique test.  I reviewed external records: previous lab results from outside ED.   The patients assessment required an independent historian: Family present at bedside provide historical permission given history of dementia..  The reason they were needed is dementia.    I independently ordered and interpreted the ED EKG in the absence of a Cardiologist.    Rate: 73  EKG Interpretation: Sinus rhythm.  No evidence of arrhythmia.  ST Segments: Normal ST segments - NO STEMI      I interpreted the X-rays chest x-ray with no infiltrate.  No consolidation.  Agree with radiology interpretation..  I interpreted the CT Scan CT head with no acute intracranial abnormality noted.  Agree with radiology interpretation..    Discussion of management or test interpretation.  68-year-old male with history of hypertension, dementia, GERD, depression, seizure disorder presents via EMS from home with complaint of frequent falls.  States that patient was found on the floor of his bedroom this morning and was covered in urine.  States that he was alert and at his baseline.  States that he had another fall later this morning while on the toilet.  Vital signs stable.  Afebrile.  CT head with no acute intracranial abnormality noted.  Chest x-ray with no infiltrate.  Labs

## 2024-02-17 NOTE — ED NOTES
Report received from JERICHO Nunes and care assumed at this time.      Elizabeth Perez RN  02/17/24 0978

## 2024-02-19 DIAGNOSIS — F01.A0 MILD VASCULAR DEMENTIA WITHOUT BEHAVIORAL DISTURBANCE, PSYCHOTIC DISTURBANCE, MOOD DISTURBANCE, OR ANXIETY (HCC): Primary | ICD-10-CM

## 2024-02-19 DIAGNOSIS — F03.90 DEMENTIA WITHOUT BEHAVIORAL DISTURBANCE, PSYCHOTIC DISTURBANCE, MOOD DISTURBANCE, OR ANXIETY, UNSPECIFIED DEMENTIA SEVERITY, UNSPECIFIED DEMENTIA TYPE (HCC): ICD-10-CM

## 2024-02-19 RX ORDER — TAMSULOSIN HYDROCHLORIDE 0.4 MG/1
0.4 CAPSULE ORAL DAILY
Qty: 90 CAPSULE | Refills: 3 | Status: SHIPPED | OUTPATIENT
Start: 2024-02-19

## 2024-02-20 ENCOUNTER — CARE COORDINATION (OUTPATIENT)
Dept: CARE COORDINATION | Facility: CLINIC | Age: 69
End: 2024-02-20

## 2024-02-20 NOTE — CARE COORDINATION
Care Transitions Outreach Attempt    Call within 2 business days of discharge: Yes   Attempted to reach patient for transitions of care follow up. Unable to reach patient.    Patient: Rick Cortez III Patient : 1955 MRN: [id number]    Last Discharge Facility       Date Complaint Diagnosis Description Type Department Provider    24 Fall Closed head injury, initial encounter ... ED (DISCHARGE) [facility] Shaheed Parmar Jr., MD              Was this an external facility discharge? No Discharge Facility Name: [facility]    Noted following upcoming appointments from discharge chart review:   BS follow up appointment(s):   Future Appointments   Date Time Provider Department Center   2024 10:00 AM Douglas Flores DO [facility]     [facility]  follow up appointment(s): none noted      ER visit on 2024: from Epic notes  Clinical Impressions      Closed head injury, initial encounter    Dementia, unspecified dementia severity, unspecified dementia type, unspecified whether behavioral, psychotic, or mood disturbance or anxiety.

## 2024-02-21 PROCEDURE — 93296 REM INTERROG EVL PM/IDS: CPT | Performed by: INTERNAL MEDICINE

## 2024-02-21 PROCEDURE — 93294 REM INTERROG EVL PM/LDLS PM: CPT | Performed by: INTERNAL MEDICINE

## 2024-02-29 ENCOUNTER — TELEPHONE (OUTPATIENT)
Dept: FAMILY MEDICINE CLINIC | Facility: CLINIC | Age: 69
End: 2024-02-29

## 2024-02-29 NOTE — TELEPHONE ENCOUNTER
Ms. Sawant, Pt's sister, states that  nurse was there today. Ms. Sawant wants for  to look at patient's recent CBC. She states that the patient has been pale and very tired lately and she was to rule out if the patient has anemia due to low iron . She states that the patient had a good heart rate today and his BP was 110/70.    Please advice.

## 2024-03-01 PROBLEM — F33.1 MAJOR DEPRESSIVE DISORDER, RECURRENT, MODERATE (HCC): Status: RESOLVED | Noted: 2023-12-19 | Resolved: 2024-03-01

## 2024-03-01 PROBLEM — R41.0 DELIRIUM: Status: RESOLVED | Noted: 2023-09-07 | Resolved: 2024-03-01

## 2024-03-01 PROBLEM — J96.01 ACUTE HYPOXIC RESPIRATORY FAILURE (HCC): Status: RESOLVED | Noted: 2024-01-30 | Resolved: 2024-03-01

## 2024-03-01 PROBLEM — G93.41 ACUTE METABOLIC ENCEPHALOPATHY: Status: RESOLVED | Noted: 2024-01-30 | Resolved: 2024-03-01

## 2024-03-01 PROBLEM — J96.01 ACUTE RESPIRATORY FAILURE WITH HYPOXIA (HCC): Status: RESOLVED | Noted: 2024-01-30 | Resolved: 2024-03-01

## 2024-03-01 PROBLEM — U07.1 COVID-19: Status: RESOLVED | Noted: 2024-01-30 | Resolved: 2024-03-01

## 2024-03-12 ENCOUNTER — TELEPHONE (OUTPATIENT)
Dept: FAMILY MEDICINE CLINIC | Facility: CLINIC | Age: 69
End: 2024-03-12

## 2024-03-12 NOTE — TELEPHONE ENCOUNTER
Family member called on behalf of pt requesting a requesting a test for iron deficiency and a CBC for the pt.    Please advise...

## 2024-04-09 ENCOUNTER — TELEPHONE (OUTPATIENT)
Age: 69
End: 2024-04-09

## 2024-04-09 NOTE — TELEPHONE ENCOUNTER
Low BP 84/64, sweating, oxygen 86.       Spoke with the sister Ebony and she stated its very hard for him to come in the office.

## 2024-04-09 NOTE — TELEPHONE ENCOUNTER
4/9/24 at 3:40 Triage left message to return call to this nurse.    Upon waking BP 84/64   O2 86%  Within 15 minutes BP up to 97/53  O2 sat 98%  Pt takes olmesartan 20mg q am    Held olmesartan 20mg this morning and she usually holds it for SBP less than 117  Increased po fluids through the day and BP now is 105/71  HR 80's    Any changes needed?    Advised Ebony to hold olmesartan for SBP less than 120. Will inform Dr. Evans 4/11/24 and call back with his response. If pt's symptoms worsen, call our office back or if needed proceed to ER. She verbalizes understanding and agrees to plan.

## 2024-04-11 NOTE — TELEPHONE ENCOUNTER
Informed pt's (EC) Lairsa of Dr. Evans's response.  Larisa voiced understanding and states that's what they've been doing. If /< Olmesartan is held.    Bp ~117/70 today per HH. About the pt's norm.   Advise continue as doing. Ok to give 1/2 tab Olmesartan, can always add the other half, prn.   Keep VS log.   FU 5/20/24 4p Dr. Nathan MA.   Bring VS log.   Larisa voiced understanding and thanks. cgh   Benzoyl Peroxide Counseling: Patient counseled that medicine may cause skin irritation and bleach clothing.  In the event of skin irritation, the patient was advised to reduce the amount of the drug applied or use it less frequently.   The patient verbalized understanding of the proper use and possible adverse effects of benzoyl peroxide.  All of the patient's questions and concerns were addressed.

## 2024-05-20 ENCOUNTER — OFFICE VISIT (OUTPATIENT)
Age: 69
End: 2024-05-20
Payer: MEDICARE

## 2024-05-20 VITALS — HEART RATE: 72 BPM | DIASTOLIC BLOOD PRESSURE: 80 MMHG | SYSTOLIC BLOOD PRESSURE: 124 MMHG

## 2024-05-20 DIAGNOSIS — I10 PRIMARY HYPERTENSION: Primary | Chronic | ICD-10-CM

## 2024-05-20 PROCEDURE — 1036F TOBACCO NON-USER: CPT | Performed by: INTERNAL MEDICINE

## 2024-05-20 PROCEDURE — 3074F SYST BP LT 130 MM HG: CPT | Performed by: INTERNAL MEDICINE

## 2024-05-20 PROCEDURE — G8420 CALC BMI NORM PARAMETERS: HCPCS | Performed by: INTERNAL MEDICINE

## 2024-05-20 PROCEDURE — G8428 CUR MEDS NOT DOCUMENT: HCPCS | Performed by: INTERNAL MEDICINE

## 2024-05-20 PROCEDURE — 99214 OFFICE O/P EST MOD 30 MIN: CPT | Performed by: INTERNAL MEDICINE

## 2024-05-20 PROCEDURE — 3017F COLORECTAL CA SCREEN DOC REV: CPT | Performed by: INTERNAL MEDICINE

## 2024-05-20 PROCEDURE — 3079F DIAST BP 80-89 MM HG: CPT | Performed by: INTERNAL MEDICINE

## 2024-05-20 PROCEDURE — 1123F ACP DISCUSS/DSCN MKR DOCD: CPT | Performed by: INTERNAL MEDICINE

## 2024-05-20 NOTE — PROGRESS NOTES
[facility] CARDIOLOGY  [address] [place] [address], [place] [address]  PHONE: [phone]      24    NAME:  Rick Cortez III  : 1955  MRN: [id number]       SUBJECTIVE:   Rick Cortez III is a 69 y.o. male seen for a follow up visit regarding the following:     No chief complaint on file.        HPI:    No cp or hester. No orthopnea or pnd. No palpitations or syncope.      Past Medical History, Past Surgical History, Family history, Social History, and Medications were all reviewed with the patient today and updated as necessary.     Current Outpatient Medications   Medication Sig Dispense Refill    tamsulosin (FLOMAX) 0.4 MG capsule Take 1 capsule by mouth daily 90 capsule 3    Cholecalciferol (VITAMIN D3) 50 MCG (2000) CAPS Take by mouth      vitamin E 400 UNIT capsule Take 1 capsule by mouth daily      divalproex (DEPAKOTE SPRINKLE) 125 MG DR capsule Take 2 in AM and 3 at noon and 3 at hs (Patient taking differently: Take by mouth 3 times daily Takes 250 mg in AM and 375 mg at 1500 and 375 mg at hs) 240 capsule 5    memantine (NAMENDA) 10 MG tablet Take 1 tablet by mouth 2 times daily 180 tablet 3    risperiDONE (RISPERDAL) 1 MG tablet Take 1 tablet by mouth 2 times daily (Patient taking differently: Take 1 tablet by mouth nightly) 60 tablet 5    donepezil (ARICEPT) 10 MG tablet Take 1 tablet by mouth nightly 90 tablet 3    olmesartan (BENICAR) 20 MG tablet Take 1 tablet by mouth daily (Patient taking differently: Take 1 tablet by mouth daily Holding for /<) 90 tablet 1    simvastatin (ZOCOR) 10 MG tablet Take 1 tablet by mouth nightly TAKE ONE TABLET BY MOUTH AT BEDTIME 90 tablet 1    ASPIRIN LOW DOSE 81 MG chewable tablet       ipratropium (ATROVENT) 0.03 % nasal spray 2 sprays by Each Nostril route in the morning and 2 sprays in the evening. 30 mL 5    sertraline (ZOLOFT) 100 MG tablet Take 1.5 q day 135 tablet 3    risperiDONE (RISPERDAL) 0.5 MG tablet Take 1 tablet by mouth

## 2024-06-06 ENCOUNTER — TELEPHONE (OUTPATIENT)
Dept: FAMILY MEDICINE CLINIC | Facility: CLINIC | Age: 69
End: 2024-06-06

## 2024-06-06 NOTE — TELEPHONE ENCOUNTER
Verbal order for Terrie miner  to follow up with this patient  for social work.  2 calls over the phone.

## 2024-06-13 PROCEDURE — 93296 REM INTERROG EVL PM/IDS: CPT | Performed by: INTERNAL MEDICINE

## 2024-06-13 PROCEDURE — 93294 REM INTERROG EVL PM/LDLS PM: CPT | Performed by: INTERNAL MEDICINE

## 2024-07-11 ENCOUNTER — OFFICE VISIT (OUTPATIENT)
Dept: NEUROLOGY | Age: 69
End: 2024-07-11
Payer: MEDICARE

## 2024-07-11 DIAGNOSIS — F03.B18 MODERATE DEMENTIA WITH OTHER BEHAVIORAL DISTURBANCE, UNSPECIFIED DEMENTIA TYPE (HCC): Primary | ICD-10-CM

## 2024-07-11 PROCEDURE — G8427 DOCREV CUR MEDS BY ELIG CLIN: HCPCS | Performed by: PSYCHIATRY & NEUROLOGY

## 2024-07-11 PROCEDURE — 3017F COLORECTAL CA SCREEN DOC REV: CPT | Performed by: PSYCHIATRY & NEUROLOGY

## 2024-07-11 PROCEDURE — 1123F ACP DISCUSS/DSCN MKR DOCD: CPT | Performed by: PSYCHIATRY & NEUROLOGY

## 2024-07-11 PROCEDURE — 1036F TOBACCO NON-USER: CPT | Performed by: PSYCHIATRY & NEUROLOGY

## 2024-07-11 PROCEDURE — G8420 CALC BMI NORM PARAMETERS: HCPCS | Performed by: PSYCHIATRY & NEUROLOGY

## 2024-07-11 PROCEDURE — 99214 OFFICE O/P EST MOD 30 MIN: CPT | Performed by: PSYCHIATRY & NEUROLOGY

## 2024-07-11 RX ORDER — DIVALPROEX SODIUM 125 MG/1
CAPSULE, COATED PELLETS ORAL
Qty: 240 CAPSULE | Refills: 5 | Status: SHIPPED | OUTPATIENT
Start: 2024-07-11

## 2024-07-11 RX ORDER — MEMANTINE HYDROCHLORIDE 10 MG/1
10 TABLET ORAL 2 TIMES DAILY
Qty: 180 TABLET | Refills: 3 | Status: SHIPPED | OUTPATIENT
Start: 2024-07-11

## 2024-07-11 RX ORDER — RISPERIDONE 1 MG/1
1 TABLET ORAL
Qty: 30 TABLET | Refills: 5 | Status: SHIPPED | OUTPATIENT
Start: 2024-07-11

## 2024-07-11 RX ORDER — DONEPEZIL HYDROCHLORIDE 10 MG/1
10 TABLET, FILM COATED ORAL NIGHTLY
Qty: 90 TABLET | Refills: 3 | Status: SHIPPED | OUTPATIENT
Start: 2024-07-11

## 2024-07-11 ASSESSMENT — ENCOUNTER SYMPTOMS
EYES NEGATIVE: 1
RESPIRATORY NEGATIVE: 1
GASTROINTESTINAL NEGATIVE: 1
ALLERGIC/IMMUNOLOGIC NEGATIVE: 1

## 2024-07-11 NOTE — PROGRESS NOTES
No orders of the defined types were placed in this encounter.         I have spent greater than 50% of visit discussing and counseling of patient  for treatment and diagnostic plan review. Total time30     .      Notes: Patient is to continue all medications as directed by prescribing physicians. Continuations on today's visit are made based on the patient's report of current medications.             Dr. Douglas Flores  Consultation Neurology, Neurodiagnostics and Neurotherapeutics  Neuroelectrophysiology, EEG, EMG  [address], [place] [address]  Phone: [phone] Fax [phone]

## 2024-07-18 RX ORDER — DIVALPROEX SODIUM 125 MG/1
CAPSULE, COATED PELLETS ORAL
Qty: 240 CAPSULE | Refills: 5 | Status: SHIPPED | OUTPATIENT
Start: 2024-07-18

## 2024-09-24 PROCEDURE — 93296 REM INTERROG EVL PM/IDS: CPT | Performed by: INTERNAL MEDICINE

## 2024-09-24 PROCEDURE — 93294 REM INTERROG EVL PM/LDLS PM: CPT | Performed by: INTERNAL MEDICINE

## (undated) DEVICE — AMD ANTIMICROBIAL SUPER SPONGES,MEDIUM: Brand: KERLIX

## (undated) DEVICE — AMD ANTIMICROBIAL GAUZE SPONGES 8 PLY USP TYPE VII: Brand: CURITY

## (undated) DEVICE — SKIN STAPLER: Brand: SIGNET

## (undated) DEVICE — SUT ETHLN 3-0 18IN PS2 BLK --

## (undated) DEVICE — MEDI-VAC NON-CONDUCTIVE SUCTION TUBING: Brand: CARDINAL HEALTH

## (undated) DEVICE — TRAY PREP DRY W/ PREM GLV 2 APPL 6 SPNG 2 UNDPD 1 OVERWRAP

## (undated) DEVICE — CONTAINER SPEC FRMLN 120ML --

## (undated) DEVICE — SOLUTION IV 1000ML 0.9% SOD CHL

## (undated) DEVICE — 3M™ TEGADERM™ TRANSPARENT FILM DRESSING FRAME STYLE, 1626W, 4 IN X 4-3/4 IN (10 CM X 12 CM), 50/CT 4CT/CASE: Brand: 3M™ TEGADERM™

## (undated) DEVICE — MEDI-VAC YANKAUER SUCTION HANDLE W/BULBOUS TIP: Brand: CARDINAL HEALTH

## (undated) DEVICE — REM POLYHESIVE ADULT PATIENT RETURN ELECTRODE: Brand: VALLEYLAB

## (undated) DEVICE — HYPODERMIC SAFETY NEEDLE: Brand: MAGELLAN

## (undated) DEVICE — SURGICAL PROCEDURE PACK BASIC ST FRANCIS

## (undated) DEVICE — SUTURE VCRL SZ 3-0 L27IN ABSRB UD L26MM SH 1/2 CIR J416H

## (undated) DEVICE — PENROSE DRAIN 12" X 1/4: Brand: CARDINAL HEALTH

## (undated) DEVICE — SHEET, DRAPE, SPLIT, STERILE: Brand: MEDLINE

## (undated) DEVICE — BUTTON SWITCH PENCIL BLADE ELECTRODE, HOLSTER: Brand: EDGE

## (undated) DEVICE — 2000CC GUARDIAN II: Brand: GUARDIAN